# Patient Record
Sex: MALE | Race: WHITE | Employment: OTHER | URBAN - METROPOLITAN AREA
[De-identification: names, ages, dates, MRNs, and addresses within clinical notes are randomized per-mention and may not be internally consistent; named-entity substitution may affect disease eponyms.]

---

## 2017-06-23 ENCOUNTER — TELEPHONE (OUTPATIENT)
Dept: INTERNAL MEDICINE CLINIC | Facility: CLINIC | Age: 77
End: 2017-06-23

## 2017-06-23 ENCOUNTER — OFFICE VISIT (OUTPATIENT)
Dept: INTERNAL MEDICINE CLINIC | Facility: CLINIC | Age: 77
End: 2017-06-23

## 2017-06-23 VITALS
SYSTOLIC BLOOD PRESSURE: 152 MMHG | OXYGEN SATURATION: 98 % | DIASTOLIC BLOOD PRESSURE: 77 MMHG | WEIGHT: 168 LBS | HEART RATE: 55 BPM | TEMPERATURE: 98 F

## 2017-06-23 DIAGNOSIS — R60.9 EDEMA, UNSPECIFIED TYPE: ICD-10-CM
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
## 2017-06-23 DIAGNOSIS — I10 ESSENTIAL HYPERTENSION: ICD-10-CM

## 2017-06-23 DIAGNOSIS — Z76.89 ENCOUNTER TO ESTABLISH CARE: Primary | ICD-10-CM

## 2017-06-23 DIAGNOSIS — M62.81 NEUROLOGICAL MUSCLE WEAKNESS: ICD-10-CM

## 2017-06-23 DIAGNOSIS — R26.2 DIFFICULTY WALKING: ICD-10-CM

## 2017-06-23 DIAGNOSIS — R26.89 BALANCE PROBLEM: ICD-10-CM

## 2017-06-23 PROCEDURE — G0463 HOSPITAL OUTPT CLINIC VISIT: HCPCS | Performed by: INTERNAL MEDICINE

## 2017-06-23 PROCEDURE — 99204 OFFICE O/P NEW MOD 45 MIN: CPT | Performed by: INTERNAL MEDICINE

## 2017-06-23 RX ORDER — HYDROCHLOROTHIAZIDE 25 MG/1
25 TABLET ORAL DAILY
COMMUNITY
End: 2017-09-21

## 2017-06-23 RX ORDER — MELATONIN
2000
COMMUNITY

## 2017-06-23 RX ORDER — CHLORAL HYDRATE 500 MG
1000 CAPSULE ORAL DAILY
COMMUNITY
End: 2018-02-27

## 2017-06-23 RX ORDER — TORSEMIDE 10 MG/1
10 TABLET ORAL DAILY
COMMUNITY
End: 2017-09-21

## 2017-06-23 RX ORDER — PYRIDOXINE HCL (VITAMIN B6) 50 MG
1 TABLET ORAL
COMMUNITY

## 2017-06-23 RX ORDER — VALSARTAN 160 MG/1
160 TABLET ORAL 2 TIMES DAILY
COMMUNITY
End: 2018-02-16

## 2017-06-23 NOTE — TELEPHONE ENCOUNTER
Medical record release signed and faxed with confirmation:    Dr Delia Stubbs  [address]  [address]  [id number]    Dr Lorne Vidales [id number]    Dr Nikolas Loredo  [address]  [id number]

## 2017-06-23 NOTE — TELEPHONE ENCOUNTER
Patient spouse calling she forgot to tell the medical staff her  is also allergic to [address].

## 2017-06-25 PROBLEM — R26.2 DIFFICULTY WALKING: Status: ACTIVE | Noted: 2017-06-25

## 2017-06-25 PROBLEM — R60.9 EDEMA: Status: ACTIVE | Noted: 2017-06-25

## 2017-06-25 PROBLEM — I10 ESSENTIAL HYPERTENSION: Status: ACTIVE | Noted: 2017-06-25

## 2017-06-25 PROBLEM — R26.89 BALANCE PROBLEM: Status: ACTIVE | Noted: 2017-06-25

## 2017-06-25 PROBLEM — M62.81 NEUROLOGICAL MUSCLE WEAKNESS: Status: ACTIVE | Noted: 2017-06-25

## 2017-06-25 NOTE — PATIENT INSTRUCTIONS
ASSESSMENT/PLAN:   Encounter to establish care  (primary encounter diagnosis)- exam is ok  Difficulty walking- use walker, take time, with his of lumbar spine stenosis, will get the notes from NS and images, also will refer her for second opinion due to hi

## 2017-06-25 NOTE — PROGRESS NOTES
HPI:    Patient ID: James Corbett. is a 64year old male. HPI  Patient comes in today for the first time to establish care.   He has history of high blood pressure edema follows with cardiology has been placed recently on both torsemide and hydrochlor Rfl:    torsemide 10 MG Oral Tab Take 10 mg by mouth daily. Disp:  Rfl:    valsartan 160 MG Oral Tab Take 160 mg by mouth daily. Disp:  Rfl:    Cyanocobalamin (B-12) 500 MCG Oral Tab Take 1 tablet by mouth daily.  Disp:  Rfl:    Vitamin D3 1000 units Oral T behavior is normal. Judgment and thought content normal.   Nursing note and vitals reviewed.            ASSESSMENT/PLAN:   Encounter to establish care  (primary encounter diagnosis)- exam is ok  Difficulty walking- use walker, take time, with his of lumbar

## 2017-07-31 ENCOUNTER — TELEPHONE (OUTPATIENT)
Dept: INTERNAL MEDICINE CLINIC | Facility: CLINIC | Age: 77
End: 2017-07-31

## 2017-07-31 NOTE — TELEPHONE ENCOUNTER
Patient spouse calling states patient has parkinson and did discuss at last office visit medication spouse states patient is having more difficulty walking and would like to discuss options with you.

## 2017-08-25 ENCOUNTER — TELEPHONE (OUTPATIENT)
Dept: NEUROLOGY | Facility: CLINIC | Age: 77
End: 2017-08-25

## 2017-08-28 ENCOUNTER — OFFICE VISIT (OUTPATIENT)
Dept: NEUROLOGY | Facility: CLINIC | Age: 77
End: 2017-08-28

## 2017-08-28 ENCOUNTER — TELEPHONE (OUTPATIENT)
Dept: NEUROLOGY | Facility: CLINIC | Age: 77
End: 2017-08-28

## 2017-08-28 VITALS
HEIGHT: 71 IN | BODY MASS INDEX: 26.6 KG/M2 | HEART RATE: 74 BPM | RESPIRATION RATE: 16 BRPM | DIASTOLIC BLOOD PRESSURE: 74 MMHG | SYSTOLIC BLOOD PRESSURE: 132 MMHG | WEIGHT: 190 LBS

## 2017-08-28 DIAGNOSIS — R26.9 GAIT DISORDER: Primary | ICD-10-CM

## 2017-08-28 DIAGNOSIS — R41.89 COGNITIVE IMPAIRMENT: ICD-10-CM

## 2017-08-28 PROCEDURE — 99204 OFFICE O/P NEW MOD 45 MIN: CPT | Performed by: OTHER

## 2017-08-28 NOTE — PROGRESS NOTES
Emanuel Osuna. : 1940     HPI:   Patient presents with:  Parkinson's: Patient presents today with wife who states that patient was referred by Dr. Salvador Carpio to be evaluated for parkinson's.  Patient's wife states that he saw a neurologist about 2.5 MI.      Current Outpatient Prescriptions:  carbidopa-levodopa  MG Oral Tab Take 1 tablet by mouth 2 (two) times daily. Disp: 60 tablet Rfl: 5   hydrochlorothiazide 25 MG Oral Tab Take 25 mg by mouth daily.  Disp:  Rfl:    torsemide 10 MG Oral Tab [facility] upper or lower extremities  PSYCHE:no depression or anxiety  NEURO: As in HPI    EXAM:     Vitals:  /74 (BP Location: Right arm, Patient Position: Sitting, Cuff Size: adult)   Pulse 74   Resp 16   Ht 71\"   Wt 190 lb   BMI 26.50 kg/m² .   Blood pressu especially in the axial muscles. Although he has no tremor, he does have extraparametal features suggesting Parkinson's disease. Multiple subcortical infarcts and hydrocephalus also need to be excluded.   He had an MRI brain done, so a release will be sig

## 2017-08-29 ENCOUNTER — TELEPHONE (OUTPATIENT)
Dept: NEUROLOGY | Facility: CLINIC | Age: 77
End: 2017-08-29

## 2017-08-29 NOTE — TELEPHONE ENCOUNTER
Records reviewed. MRI brain 2015:  Atrophy and white matter changes. CT brain 12/16: unchanged. MRI cervical: deg. Changes, normal cord.  TSH, B12, cholesterol normal.

## 2017-08-30 ENCOUNTER — MED REC SCAN ONLY (OUTPATIENT)
Dept: NEUROLOGY | Facility: CLINIC | Age: 77
End: 2017-08-30

## 2017-09-20 ENCOUNTER — OFFICE VISIT (OUTPATIENT)
Dept: INTERNAL MEDICINE CLINIC | Facility: CLINIC | Age: 77
End: 2017-09-20

## 2017-09-20 VITALS
DIASTOLIC BLOOD PRESSURE: 76 MMHG | TEMPERATURE: 99 F | SYSTOLIC BLOOD PRESSURE: 126 MMHG | HEIGHT: 71 IN | HEART RATE: 66 BPM

## 2017-09-20 DIAGNOSIS — R26.2 DIFFICULTY WALKING: Primary | ICD-10-CM

## 2017-09-20 DIAGNOSIS — R29.898 WEAKNESS OF BOTH LEGS: ICD-10-CM

## 2017-09-20 DIAGNOSIS — G20 PARKINSON'S DISEASE (HCC): ICD-10-CM

## 2017-09-20 DIAGNOSIS — R26.89 BALANCE PROBLEM: ICD-10-CM

## 2017-09-20 DIAGNOSIS — I10 ESSENTIAL HYPERTENSION: ICD-10-CM

## 2017-09-20 DIAGNOSIS — R29.6 FREQUENT FALLS: ICD-10-CM

## 2017-09-20 DIAGNOSIS — R60.9 EDEMA, UNSPECIFIED TYPE: ICD-10-CM

## 2017-09-20 LAB
ANION GAP SERPL CALC-SCNC: 9 MMOL/L (ref 0–18)
BUN SERPL-MCNC: 38 MG/DL (ref 8–20)
BUN/CREAT SERPL: 17.4 (ref 10–20)
CALCIUM SERPL-MCNC: 9.3 MG/DL (ref 8.5–10.5)
CHLORIDE SERPL-SCNC: 99 MMOL/L (ref 95–110)
CO2 SERPL-SCNC: 30 MMOL/L (ref 22–32)
CREAT SERPL-MCNC: 2.18 MG/DL (ref 0.5–1.5)
FOLATE SERPL-MCNC: 15.3 NG/ML
GLUCOSE SERPL-MCNC: 123 MG/DL (ref 70–99)
OSMOLALITY UR CALC.SUM OF ELEC: 296 MOSM/KG (ref 275–295)
POTASSIUM SERPL-SCNC: 3.3 MMOL/L (ref 3.3–5.1)
SODIUM SERPL-SCNC: 138 MMOL/L (ref 136–144)
VIT B12 SERPL-MCNC: 1171 PG/ML (ref 181–914)

## 2017-09-20 PROCEDURE — 99214 OFFICE O/P EST MOD 30 MIN: CPT | Performed by: INTERNAL MEDICINE

## 2017-09-20 PROCEDURE — G0463 HOSPITAL OUTPT CLINIC VISIT: HCPCS | Performed by: INTERNAL MEDICINE

## 2017-09-20 PROCEDURE — 36415 COLL VENOUS BLD VENIPUNCTURE: CPT | Performed by: INTERNAL MEDICINE

## 2017-09-21 ENCOUNTER — TELEPHONE (OUTPATIENT)
Dept: NEUROLOGY | Facility: CLINIC | Age: 77
End: 2017-09-21

## 2017-09-21 NOTE — TELEPHONE ENCOUNTER
LOV  8/28/17. Started on carbidopa-levodopa  mg BID. Wife states first 2 weeks had improvement in walking and mental status and now back to baseline. Gives medication at 9 am and sits at table until 11 am,then able to ambulate.

## 2017-09-25 NOTE — PATIENT INSTRUCTIONS
ASSESSMENT/PLAN:   Difficulty walking  (primary encounter diagnosis)-continues to have walking difficulty been worse lately will get some labs to make sure electrolytes are okay will follow with neurology will order physical therapy  Balance problem.   No f

## 2017-09-25 NOTE — PROGRESS NOTES
HPI:    Patient ID: Ruperto Coleman. is a 64year old male. HPI    Here with wife for follow-up.   He has been stable but lately he feels little more week in walk-in moving around he had seen neurology and was started on carbidopa levodopa initially fel hypertension       No past surgical history on file.    Family History   Problem Relation Age of Onset   • Cancer Father    • Cancer Mother    • Cancer Sister       Social History: Smoking status: Former Smoker unspecified type stable  Frequent falls due to Parkinson's  Weakness of both legs is gotten worse lately as above will get some electrolytes may need to get some physical therapy  Parkinson's disease (hcc)-with neurology was started on carbidopa levodopa

## 2017-09-26 ENCOUNTER — TELEPHONE (OUTPATIENT)
Dept: NEUROLOGY | Facility: CLINIC | Age: 77
End: 2017-09-26

## 2017-09-26 ENCOUNTER — TELEPHONE (OUTPATIENT)
Dept: INTERNAL MEDICINE CLINIC | Facility: CLINIC | Age: 77
End: 2017-09-26

## 2017-09-26 NOTE — TELEPHONE ENCOUNTER
Spoke with wife, informed patient to start torsemide 10 mg daily and follow up in one week. Appointment scheduled 10/4 at 1 pm with Dr. George Aguilera.

## 2017-09-26 NOTE — TELEPHONE ENCOUNTER
Spoke with wife shraddha. Per wife, feet are swollen, skin appears pink, stiffness, denies pain. Patient with sensation to toes. Informed wife that patient needs to return in two weeks per test results. Appointment changed.  Wife said patient has US appointmen

## 2017-09-26 NOTE — TELEPHONE ENCOUNTER
imaging cd and report from [facility]   Cd to be loaded in PACS

## 2017-09-27 ENCOUNTER — HOSPITAL ENCOUNTER (OUTPATIENT)
Dept: ULTRASOUND IMAGING | Facility: HOSPITAL | Age: 77
Discharge: HOME | End: 2017-09-27
Attending: INTERNAL MEDICINE
Payer: MEDICARE

## 2017-09-27 DIAGNOSIS — N17.9 ACUTE RENAL FAILURE SUPERIMPOSED ON CHRONIC KIDNEY DISEASE, UNSPECIFIED CKD STAGE, UNSPECIFIED ACUTE RENAL FAILURE TYPE (HCC): ICD-10-CM

## 2017-09-27 DIAGNOSIS — N18.9 ACUTE RENAL FAILURE SUPERIMPOSED ON CHRONIC KIDNEY DISEASE, UNSPECIFIED CKD STAGE, UNSPECIFIED ACUTE RENAL FAILURE TYPE (HCC): ICD-10-CM

## 2017-09-27 PROCEDURE — 76770 US EXAM ABDO BACK WALL COMP: CPT | Performed by: INTERNAL MEDICINE

## 2017-10-02 ENCOUNTER — MED REC SCAN ONLY (OUTPATIENT)
Dept: NEUROLOGY | Facility: CLINIC | Age: 77
End: 2017-10-02

## 2017-10-02 ENCOUNTER — TELEPHONE (OUTPATIENT)
Dept: NEUROLOGY | Facility: CLINIC | Age: 77
End: 2017-10-02

## 2017-10-02 NOTE — TELEPHONE ENCOUNTER
[address] [address]. Patient still has refills on sinemet. They will refill it for patient. Spoke to Kenia Kulkarni and notified her that [facility] [address] does have refills on medication. She was understanding and thankful for the return call.

## 2017-10-02 NOTE — TELEPHONE ENCOUNTER
Almost finished with medication.  Patient's wife wants to know if Deirdre Crockett should remain on medication, if so, will need a refill

## 2017-10-03 ENCOUNTER — HOSPITAL ENCOUNTER (EMERGENCY)
Facility: HOSPITAL | Age: 77
Discharge: HOME | End: 2017-10-03
Attending: EMERGENCY MEDICINE
Payer: MEDICARE

## 2017-10-03 ENCOUNTER — TELEPHONE (OUTPATIENT)
Dept: INTERNAL MEDICINE CLINIC | Facility: CLINIC | Age: 77
End: 2017-10-03

## 2017-10-03 VITALS
BODY MASS INDEX: 27 KG/M2 | WEIGHT: 190.06 LBS | SYSTOLIC BLOOD PRESSURE: 177 MMHG | TEMPERATURE: 98 F | HEART RATE: 68 BPM | RESPIRATION RATE: 18 BRPM | OXYGEN SATURATION: 97 % | DIASTOLIC BLOOD PRESSURE: 86 MMHG

## 2017-10-03 DIAGNOSIS — R33.9 URINARY RETENTION: Primary | ICD-10-CM

## 2017-10-03 PROCEDURE — 81003 URINALYSIS AUTO W/O SCOPE: CPT | Performed by: EMERGENCY MEDICINE

## 2017-10-03 PROCEDURE — 80048 BASIC METABOLIC PNL TOTAL CA: CPT | Performed by: EMERGENCY MEDICINE

## 2017-10-03 PROCEDURE — 99283 EMERGENCY DEPT VISIT LOW MDM: CPT

## 2017-10-03 PROCEDURE — 85025 COMPLETE CBC W/AUTO DIFF WBC: CPT | Performed by: EMERGENCY MEDICINE

## 2017-10-03 PROCEDURE — 51702 INSERT TEMP BLADDER CATH: CPT

## 2017-10-03 PROCEDURE — 36415 COLL VENOUS BLD VENIPUNCTURE: CPT

## 2017-10-03 RX ORDER — TAMSULOSIN HYDROCHLORIDE 0.4 MG/1
0.4 CAPSULE ORAL DAILY
Qty: 7 CAPSULE | Refills: 0 | Status: SHIPPED | OUTPATIENT
Start: 2017-10-03 | End: 2017-10-09 | Stop reason: CLARIF

## 2017-10-03 NOTE — TELEPHONE ENCOUNTER
Patients wife called to thank you and your staff your concern, and excelent care for her .   Patient was seen in the ER, he now has a clark and will follow up with new urologist.

## 2017-10-03 NOTE — TELEPHONE ENCOUNTER
Notes Recorded by Abigail Aguilar RN on 10/2/2017 at 3:31 PM CDT  Left message on voice mail for patient to call for results.   ------    Notes Recorded by Eli Pulliam MD on 10/2/2017 at 3:20 PM CDT  Pt needs to go to ER with worsening renal function and

## 2017-10-03 NOTE — ED PROVIDER NOTES
Patient Seen in: [facility] AND [facility] Emergency Department     History   Patient presents with:  Abdomen/Flank Pain (GI/)  Urinary Symptoms (urologic)    Stated Complaint:     HPI    64year old male complains of low back pain since starting a diuretic rece [10/03/17 1107]  SpO2: 100 % [10/03/17 1107]  O2 Device: None (Room air) [10/03/17 1107]    Current:BP (!) 177/86   Pulse 68   Temp 98.3 °F (36.8 °C) (Oral)   Resp 18   Wt 86.2 kg   SpO2 97%   BMI 26.50 kg/m²         Physical Exam   Constitutional: He is o normal limits   CBC W/ DIFFERENTIAL - Abnormal; Notable for the following:     RBC 4.47 (*)     HGB 13.3 (*)     HCT 39.7 (*)     All other components within normal limits   CBC WITH DIFFERENTIAL WITH PLATELET    Narrative:      The following orders were cr any recent pertinent discharge summaries, testing, and procedures and reviewed those reports. Complicating Factors: The patient already has has Difficulty walking; Balance problem;  Neurological muscle weakness; Essential hypertension; and Edema on his and keeping a blood pressure log to bring to their physician.       Disposition and Plan     Clinical Impression:  Urinary retention  (primary encounter diagnosis)    Disposition:  Discharge    Follow-up:  Martinez Robles MD  [address] [facility]

## 2017-10-03 NOTE — ED INITIAL ASSESSMENT (HPI)
Patient sent by Dr. Gogo Asher for US of bladder and kidneys. Patient denies any pain, frequent urination.

## 2017-10-04 ENCOUNTER — TELEPHONE (OUTPATIENT)
Dept: SURGERY | Facility: CLINIC | Age: 77
End: 2017-10-04

## 2017-10-04 ENCOUNTER — OFFICE VISIT (OUTPATIENT)
Dept: INTERNAL MEDICINE CLINIC | Facility: CLINIC | Age: 77
End: 2017-10-04

## 2017-10-04 VITALS
DIASTOLIC BLOOD PRESSURE: 78 MMHG | HEIGHT: 71 IN | WEIGHT: 181.81 LBS | SYSTOLIC BLOOD PRESSURE: 168 MMHG | TEMPERATURE: 99 F | HEART RATE: 55 BPM | BODY MASS INDEX: 25.45 KG/M2

## 2017-10-04 DIAGNOSIS — N13.30 HYDRONEPHROSIS, UNSPECIFIED HYDRONEPHROSIS TYPE: ICD-10-CM

## 2017-10-04 DIAGNOSIS — R33.9 URINARY RETENTION: ICD-10-CM

## 2017-10-04 DIAGNOSIS — N18.9 ACUTE RENAL FAILURE SUPERIMPOSED ON CHRONIC KIDNEY DISEASE, UNSPECIFIED CKD STAGE, UNSPECIFIED ACUTE RENAL FAILURE TYPE (HCC): ICD-10-CM

## 2017-10-04 DIAGNOSIS — N17.9 ACUTE RENAL FAILURE SUPERIMPOSED ON CHRONIC KIDNEY DISEASE, UNSPECIFIED CKD STAGE, UNSPECIFIED ACUTE RENAL FAILURE TYPE (HCC): ICD-10-CM

## 2017-10-04 DIAGNOSIS — I10 ESSENTIAL HYPERTENSION: ICD-10-CM

## 2017-10-04 DIAGNOSIS — Z09 ENCOUNTER FOR EXAMINATION FOLLOWING TREATMENT AT HOSPITAL: Primary | ICD-10-CM

## 2017-10-04 PROCEDURE — G0463 HOSPITAL OUTPT CLINIC VISIT: HCPCS | Performed by: INTERNAL MEDICINE

## 2017-10-04 PROCEDURE — 99214 OFFICE O/P EST MOD 30 MIN: CPT | Performed by: INTERNAL MEDICINE

## 2017-10-04 RX ORDER — AMLODIPINE BESYLATE 5 MG/1
5 TABLET ORAL DAILY
Qty: 30 TABLET | Refills: 2 | Status: SHIPPED | OUTPATIENT
Start: 2017-10-04 | End: 2018-02-16

## 2017-10-04 NOTE — PROGRESS NOTES
HPI:    Patient ID: Elba Krause. is a 64year old male. HPI  Patient comes in for follow-up after he was seen in ER for acute renal failure with hydronephrosis and urinary retention.   Clark was placed morning on thousand cc was drained clark was le by mouth daily. Disp:  Rfl:    omega-3 fatty acids 1000 MG Oral Cap Take 1,000 mg by mouth daily. Disp:  Rfl:      Allergies:  Codeine                 Other (See Comments)    Comment:Pt could not wake up.   Escitalopram                HISTORY:  Past Medical (primary encounter diagnosis)- doing ok, clark was placed   Urinary retention- clark draining, pt was started on Flomax, will refer to urology  Hydronephrosis, unspecified hydronephrosis type- as above, ?  Cause, will follow  with urologist   Acute renal fa

## 2017-10-04 NOTE — PATIENT INSTRUCTIONS
ASSESSMENT/PLAN:   Encounter for examination following treatment at hospital  (primary encounter diagnosis)- doing ok, clark was placed   Urinary retention- clark draining, pt was started on Flomax, will refer to urology  Hydronephrosis, unspecified hydron

## 2017-10-04 NOTE — TELEPHONE ENCOUNTER
Pts spouse states pt was seen at [address] [place] [address] ED yesterday for urinary retention, pt saw PCP today and was advised to make appt with [facility]K next week. Pls advise thank you.

## 2017-10-05 ENCOUNTER — TELEPHONE (OUTPATIENT)
Dept: INTERNAL MEDICINE CLINIC | Facility: CLINIC | Age: 77
End: 2017-10-05

## 2017-10-05 NOTE — TELEPHONE ENCOUNTER
See telephone encounter for urology of same date. Message was forwarded to urology clinical staff for triage.

## 2017-10-06 NOTE — TELEPHONE ENCOUNTER
Spoke to jeannie at Dr. Kimberly Higginbotham office . They are able to add him to the scheduled on Monday October 9 th at 1:20 pm  [facility] 2nd floor .  Patient spouse contacted

## 2017-10-06 NOTE — TELEPHONE ENCOUNTER
Patient has called Dr Jered Moore office they have no opening with any of there doctors patient sill with blood in urine can not get appointment in near future message was sent to specialist patient spouse Denise Boyd has not heard from anyone.

## 2017-10-06 NOTE — TELEPHONE ENCOUNTER
Consult scheduled for Mon 10/9 @ 1:20 pm with Dr. Mark Anthony Quevedo. Pretty Galvan in Dr. José Miguel Mitchell office is aware and states she will contact patient.

## 2017-10-06 NOTE — TELEPHONE ENCOUNTER
Pt's wife called stating pt was in [facility] hosp er 10-3-17. Cath to drain the urine. Ultra sound done 9-27-17. Pt advised by pcp to be seen 10-9-17 or 10-10-17.   Call pt to advise

## 2017-10-06 NOTE — TELEPHONE ENCOUNTER
Can we call the office he can see either one of the urologist , tell them Dr George Aguilera is requesting

## 2017-10-06 NOTE — TELEPHONE ENCOUNTER
Spoke to [address] HARESH/Tyler Tran in Dr. Lilo Hollingsworth office. Offered consult appointment for Mon 10/9 @ 1:20 pm; patient to arrive 15 min before. [address] HARESH/Tyler Tran in Dr. Lilo Hollingsworth office agrees to consult appointment and states she will call patient. Consult scheduled.

## 2017-10-07 ENCOUNTER — HOSPITAL ENCOUNTER (EMERGENCY)
Facility: HOSPITAL | Age: 77
Discharge: HOME | End: 2017-10-07
Attending: EMERGENCY MEDICINE
Payer: MEDICARE

## 2017-10-07 ENCOUNTER — TELEPHONE (OUTPATIENT)
Dept: INTERNAL MEDICINE CLINIC | Facility: CLINIC | Age: 77
End: 2017-10-07

## 2017-10-07 VITALS
RESPIRATION RATE: 20 BRPM | HEIGHT: 72 IN | TEMPERATURE: 99 F | WEIGHT: 189 LBS | OXYGEN SATURATION: 99 % | HEART RATE: 80 BPM | DIASTOLIC BLOOD PRESSURE: 78 MMHG | BODY MASS INDEX: 25.6 KG/M2 | SYSTOLIC BLOOD PRESSURE: 138 MMHG

## 2017-10-07 DIAGNOSIS — N48.1 BALANITIS: Primary | ICD-10-CM

## 2017-10-07 DIAGNOSIS — Z97.8 PRESENCE OF INDWELLING FOLEY CATHETER: ICD-10-CM

## 2017-10-07 PROCEDURE — 87077 CULTURE AEROBIC IDENTIFY: CPT | Performed by: EMERGENCY MEDICINE

## 2017-10-07 PROCEDURE — 87186 SC STD MICRODIL/AGAR DIL: CPT | Performed by: EMERGENCY MEDICINE

## 2017-10-07 PROCEDURE — 81001 URINALYSIS AUTO W/SCOPE: CPT | Performed by: EMERGENCY MEDICINE

## 2017-10-07 PROCEDURE — 87086 URINE CULTURE/COLONY COUNT: CPT | Performed by: EMERGENCY MEDICINE

## 2017-10-07 PROCEDURE — 99283 EMERGENCY DEPT VISIT LOW MDM: CPT

## 2017-10-07 RX ORDER — CLOTRIMAZOLE AND BETAMETHASONE DIPROPIONATE 10; .64 MG/G; MG/G
1 CREAM TOPICAL 2 TIMES DAILY
Qty: 15 G | Refills: 0 | Status: SHIPPED | OUTPATIENT
Start: 2017-10-07 | End: 2017-12-11

## 2017-10-07 NOTE — ED INITIAL ASSESSMENT (HPI)
Pt states clark cath is very painful swelling at the tip, and some bleeding around tip of cath, has had cath since Oct 3 rd due to urinary retention has a appt with urinologist Monday

## 2017-10-07 NOTE — ED PROVIDER NOTES
Patient Seen in: [facility] Emergency Department    History   No chief complaint on file. Stated Complaint: Cath Problem    HPI    Patient presents emergency department complaining of irritation swelling and discomfort at the tip of the penis. Normocephalic. Eyes: Conjunctivae and EOM are normal.   Neck: Normal range of motion. Neck supple. Cardiovascular: Normal rate and regular rhythm. No murmur heard. Pulmonary/Chest: Effort normal and breath sounds normal. No respiratory distress. follow up care with a primary care provider within the next three months to obtain basic health screening including reassessment of your blood pressure. \"      Disposition and Plan     Clinical Impression:  Balanitis  (primary encounter diagnosis)  Presenc

## 2017-10-07 NOTE — TELEPHONE ENCOUNTER
Spoke with patient's wife, Ankit Griggs, and advised ER evaluation. She agreed and will take him over.

## 2017-10-07 NOTE — TELEPHONE ENCOUNTER
Please see Reason for Call notes. Catheterized patient with swollen tip of penis, some bleeding. Appointment with urology Monday at 1. Dr. Avery Gifford prescribed bacitracin but patient now experiencing pain when sitting and when walking. Please advise.

## 2017-10-09 ENCOUNTER — OFFICE VISIT (OUTPATIENT)
Dept: SURGERY | Facility: CLINIC | Age: 77
End: 2017-10-09

## 2017-10-09 ENCOUNTER — TELEPHONE (OUTPATIENT)
Dept: SURGERY | Facility: CLINIC | Age: 77
End: 2017-10-09

## 2017-10-09 ENCOUNTER — LAB ENCOUNTER (OUTPATIENT)
Dept: LAB | Facility: HOSPITAL | Age: 77
End: 2017-10-09
Attending: UROLOGY
Payer: MEDICARE

## 2017-10-09 VITALS
SYSTOLIC BLOOD PRESSURE: 144 MMHG | BODY MASS INDEX: 25.6 KG/M2 | WEIGHT: 189 LBS | HEART RATE: 65 BPM | TEMPERATURE: 98 F | HEIGHT: 72 IN | DIASTOLIC BLOOD PRESSURE: 79 MMHG | RESPIRATION RATE: 17 BRPM

## 2017-10-09 DIAGNOSIS — N13.30 HYDRONEPHROSIS, UNSPECIFIED HYDRONEPHROSIS TYPE: ICD-10-CM

## 2017-10-09 DIAGNOSIS — N19 RENAL FAILURE, UNSPECIFIED CHRONICITY: ICD-10-CM

## 2017-10-09 DIAGNOSIS — N40.1 BENIGN PROSTATIC HYPERPLASIA WITH URINARY RETENTION: ICD-10-CM

## 2017-10-09 DIAGNOSIS — D72.829 LEUKOCYTOSIS, UNSPECIFIED TYPE: ICD-10-CM

## 2017-10-09 DIAGNOSIS — R33.8 BENIGN PROSTATIC HYPERPLASIA WITH URINARY RETENTION: ICD-10-CM

## 2017-10-09 DIAGNOSIS — G20 PARKINSON'S DISEASE (HCC): ICD-10-CM

## 2017-10-09 DIAGNOSIS — N13.30 HYDRONEPHROSIS, UNSPECIFIED HYDRONEPHROSIS TYPE: Primary | ICD-10-CM

## 2017-10-09 DIAGNOSIS — N39.0 RECURRENT UTI: ICD-10-CM

## 2017-10-09 PROCEDURE — 36415 COLL VENOUS BLD VENIPUNCTURE: CPT

## 2017-10-09 PROCEDURE — G0463 HOSPITAL OUTPT CLINIC VISIT: HCPCS | Performed by: UROLOGY

## 2017-10-09 PROCEDURE — 80069 RENAL FUNCTION PANEL: CPT

## 2017-10-09 PROCEDURE — 85025 COMPLETE CBC W/AUTO DIFF WBC: CPT

## 2017-10-09 PROCEDURE — 99204 OFFICE O/P NEW MOD 45 MIN: CPT | Performed by: UROLOGY

## 2017-10-09 RX ORDER — TAMSULOSIN HYDROCHLORIDE 0.4 MG/1
CAPSULE ORAL
Qty: 30 CAPSULE | Refills: 11 | Status: ON HOLD | OUTPATIENT
Start: 2017-10-09 | End: 2017-12-04

## 2017-10-09 RX ORDER — FINASTERIDE 5 MG/1
5 TABLET, FILM COATED ORAL DAILY
Qty: 30 TABLET | Refills: 11 | Status: SHIPPED | OUTPATIENT
Start: 2017-10-09 | End: 2018-10-22

## 2017-10-09 NOTE — TELEPHONE ENCOUNTER
,please see message below. See portion of your lov copied and pasted below as follows. It appears Finasteride was not ordered in [facility]. I have pended order of you, please review and sign if correct. Thank you.   9.  Start Finasteride 5 mg daily --- ple

## 2017-10-09 NOTE — PATIENT INSTRUCTIONS
1. CBC today to make sure no dressing urinary tract infection in light of worsening fatigue and being more tired today. Also blood draw for renal panel to reevaluate your kidney function (kidney function was impaired on blood test a few weeks ago)    2. your kidneys. Eating less protein may slow the progression of chronic kidney disease. Foods high in protein include meat, fish, eggs, cheese, and other dairy products.  A registered dietitian can help you plan a diet that has the right amount of protein for Limit your intake of phosphorus-rich foods. These include dried beans and peas, nuts, peanut butter, cocoa, beer, cola drinks, and dairy products.   Date Last Reviewed: 8/1/2016  © [id number] [place] [address]

## 2017-10-09 NOTE — PROGRESS NOTES
Misty Hernandez. is a 64year old male. HPI:   Patient presents with:  Urinary Symptoms (urologic): Catheter was placed on ER on 10/3/2017 do to urinary issues        History provided by pt and wife Robel Shen.     Urinary Incontinence  Patient is currently On his visit on 8/28/2017 with Dr. Asia Menon, pt had \"gait difficulties, bradykinesia and increased tone especially in the axial muscles suggestive of Parkinson's disease. Patient is present on a wheel chair. He does say he urinates standing up.  His wife s mupirocin 2 % External Ointment Apply 1 Application topically 2 (two) times daily. Disp: 1 Tube Rfl: 0   AmLODIPine Besylate 5 MG Oral Tab Take 1 tablet (5 mg total) by mouth daily.  Disp: 30 tablet Rfl: 2   valsartan 160 MG Oral Tab Take 160 mg by mouth 2 Constitutional: appears well hydrated alert and responsive no acute distress noted  Neurological: Oriented to time, place, person with normal affect  Exam appropriate for age; patellar reflexes not performed  Head/Face: normocephalic  Eyes/Vision: no scler 9/27/2017 US KIDNEY/BLADDER = Moderate right hydroureteronephrosis and, of uncertain etiology or chronicity; Extensive post void residual urinary bladder volume of 1194 mL.           ASSESSMENT/PLAN:      (N13.30) Hydronephrosis, unspecified hydronephrosis (Jesse Avila) Parkinson's disease [facility])  Patient has worsening gait problems and is confined to wheel chair and he also uses depends. Parkinson's can contribute to the urinary retention and will have to be further investigated with the treatments listed above. Jacob Hernandez.  is a very pleasant patient and I thoroughly enjoyed evaluating him  in consultation. I thank you for sending the patient to see me. I will do my best to keep you informed of  all significant urological findings and developments.   Than

## 2017-10-09 NOTE — TELEPHONE ENCOUNTER
pts wife, Haroldo Parents called. Jerad Alvarado was seen today by [facility]. She states they picked up Tamsulosin from the [facility], But the Finasteride was not called in. Please advise.

## 2017-10-10 ENCOUNTER — APPOINTMENT (OUTPATIENT)
Dept: ULTRASOUND IMAGING | Facility: HOSPITAL | Age: 77
End: 2017-10-10
Payer: MEDICARE

## 2017-10-10 ENCOUNTER — TELEPHONE (OUTPATIENT)
Dept: SURGERY | Facility: CLINIC | Age: 77
End: 2017-10-10

## 2017-10-10 ENCOUNTER — HOSPITAL ENCOUNTER (EMERGENCY)
Facility: HOSPITAL | Age: 77
Discharge: HOME | End: 2017-10-10
Payer: MEDICARE

## 2017-10-10 ENCOUNTER — TELEPHONE (OUTPATIENT)
Dept: INTERNAL MEDICINE CLINIC | Facility: CLINIC | Age: 77
End: 2017-10-10

## 2017-10-10 ENCOUNTER — APPOINTMENT (OUTPATIENT)
Dept: GENERAL RADIOLOGY | Facility: HOSPITAL | Age: 77
End: 2017-10-10
Attending: EMERGENCY MEDICINE
Payer: MEDICARE

## 2017-10-10 VITALS
HEIGHT: 72 IN | RESPIRATION RATE: 18 BRPM | DIASTOLIC BLOOD PRESSURE: 83 MMHG | WEIGHT: 181 LBS | TEMPERATURE: 99 F | SYSTOLIC BLOOD PRESSURE: 143 MMHG | OXYGEN SATURATION: 97 % | HEART RATE: 70 BPM | BODY MASS INDEX: 24.52 KG/M2

## 2017-10-10 DIAGNOSIS — M19.90 ACUTE ARTHRITIS: Primary | ICD-10-CM

## 2017-10-10 PROCEDURE — 36415 COLL VENOUS BLD VENIPUNCTURE: CPT

## 2017-10-10 PROCEDURE — 99284 EMERGENCY DEPT VISIT MOD MDM: CPT

## 2017-10-10 PROCEDURE — 93971 EXTREMITY STUDY: CPT

## 2017-10-10 PROCEDURE — 84550 ASSAY OF BLOOD/URIC ACID: CPT | Performed by: EMERGENCY MEDICINE

## 2017-10-10 PROCEDURE — 73610 X-RAY EXAM OF ANKLE: CPT | Performed by: EMERGENCY MEDICINE

## 2017-10-10 RX ORDER — PREDNISONE 20 MG/1
TABLET ORAL
Qty: 12 TABLET | Refills: 0 | Status: SHIPPED | OUTPATIENT
Start: 2017-10-10 | End: 2017-10-18

## 2017-10-10 NOTE — TELEPHONE ENCOUNTER
Spoke with Wife called concerned as patient is so weak, unable to walk even with walker. Appetite good, clark draining well.   /87 P-79 1 hour after BP med,  BP at 12 noon is 147/88 P-72  Patient with considerable swelling of ankles right more than l

## 2017-10-10 NOTE — TELEPHONE ENCOUNTER
Pt is very weak today. Pt went to [facility] er on 10-7-17. Took blood and urine. appt 10-9-17 with pvk. Received a call from dr Maria Isabel Pinto from [place] [address], reviewed  the results and pt should take rx. Cipro. Please call to confirm if pt should take the rx.  Cip

## 2017-10-10 NOTE — TELEPHONE ENCOUNTER
Contacted patient's pharmacy, [address] [place] [address] and advised that patient should be taking both tamsulosin and finasteride. Patient's pharmacy verbalized understanding. All questions answered.  Per yesterday's office visit; recommendation/plan (copied and pasted be

## 2017-10-10 NOTE — TELEPHONE ENCOUNTER
Per Dr. Brandon Jack; patient is not to start antibiotic today for bacteria in urine; once determination of clark catheter removal; will determine when to start antibiotic. Patient's wife contacted; relayed Dr. Catalina Monzon' message to patient's wife.

## 2017-10-10 NOTE — TELEPHONE ENCOUNTER
Patients wife called again to let MERVAT Jha know PCP is sending patient to the hospital. Please call.

## 2017-10-10 NOTE — TELEPHONE ENCOUNTER
Spoke to patient's wife. Patient's wife states ER MD called today and prescribed Cipro 500 mg PO BID x  7 days for patient's 10/7 abnormal urine culture; wife is asking if patient should start Cipro?   Advised patient's wife that at yesterday's office visi

## 2017-10-10 NOTE — TELEPHONE ENCOUNTER
Wife states will take patient to ER will need ultrasound to r/o DVT due to swelling. Instructed to tell ER to Call Dr. Jose L Queen before patient is released. Wife agreed  ER triage notified to call Dr. Jose L Queen before sending home.

## 2017-10-10 NOTE — ED NOTES
Pt states has had swelling in ankle x 2 months. Sunday night woke up and couldn't walk rt foot. Usually can walk with walker. Has been having trouble since Sunday. Denies fall or injury.

## 2017-10-10 NOTE — ED PROVIDER NOTES
Patient Seen in: [facility] AND [facility] Emergency Department    History   Patient presents with:  Swelling Edema (cardiovascular, metabolic)    Stated Complaint:     HPI    Patient is a 78-year-old male who presents to the emergency department with a chief co Eyes: Conjunctivae and EOM are normal. Pupils are equal, round, and reactive to light. Neck: Neck supple. Cardiovascular: Normal rate, regular rhythm and normal heart sounds. Pulmonary/Chest: Effort normal.   Abdominal: Soft.  Bowel sounds are normal

## 2017-10-10 NOTE — ED INITIAL ASSESSMENT (HPI)
Pt with redness and swelling x 2 months that pt \"thought was from BP medication. \" Pt states now the right ankle hard, red, swollen, painful. Per family, opt has been more weak and unable to stand well.  Pt seen here in ED recently for urology complication

## 2017-10-10 NOTE — TELEPHONE ENCOUNTER
Pharm calling. Pt had picked up 10-9-17 rx tamsulosin. After pharm received a rx. Finasteride. Should pt be taking both rx.   Call to advise

## 2017-10-11 ENCOUNTER — HOSPITAL ENCOUNTER (OUTPATIENT)
Dept: ULTRASOUND IMAGING | Facility: HOSPITAL | Age: 77
Discharge: HOME | DRG: 369 | End: 2017-10-11
Attending: UROLOGY
Payer: MEDICARE

## 2017-10-11 DIAGNOSIS — N13.30 HYDRONEPHROSIS, UNSPECIFIED HYDRONEPHROSIS TYPE: ICD-10-CM

## 2017-10-11 PROCEDURE — 76770 US EXAM ABDO BACK WALL COMP: CPT | Performed by: UROLOGY

## 2017-10-11 NOTE — TELEPHONE ENCOUNTER
Patients wife calling to speak with RN. States during yesterdays visit to ER patient was given medication to take and wants to make sure it is okay to take with Medication that PVK prescribed for patient. Per wife it urgent that someone calls back.  Please

## 2017-10-11 NOTE — TELEPHONE ENCOUNTER
Spoke with ER attending. U/S negative for DVT. Ankle L swelling. ? Gout. No evidence of  Infection. Check uric acid level. Prednisone X 5 days. F/U with PCP. Message to PCP.

## 2017-10-11 NOTE — TELEPHONE ENCOUNTER
Spoke with Ina Hayes, who states ok to tell pt's wife, that prednisone will not interact with his urology meds, however she should check with his PCP, as well. Phoned wife and informed her of this. She is thankful and agrees to plan. , thank you.

## 2017-10-11 NOTE — TELEPHONE ENCOUNTER
Returned wife's call and spoke with her. She states pt was given a prescription for Prednisone and wants to make sure it is ok to take with the meds  has him on.  Advised wife, ER doctor would have reviewed his med list, however the best thing would b

## 2017-10-11 NOTE — TELEPHONE ENCOUNTER
Wife called regarding prescription for prednisone. Asking if ok to take? Concern with interaction with other meds and concern with leg swelling. Asking if ok to take?

## 2017-10-13 ENCOUNTER — APPOINTMENT (OUTPATIENT)
Dept: CT IMAGING | Facility: HOSPITAL | Age: 77
DRG: 369 | End: 2017-10-13
Attending: EMERGENCY MEDICINE
Payer: MEDICARE

## 2017-10-13 ENCOUNTER — HOSPITAL ENCOUNTER (INPATIENT)
Facility: HOSPITAL | Age: 77
LOS: 4 days | Discharge: NURSING, REHAB OR LONG-TERM CARE | DRG: 369 | End: 2017-10-18
Attending: EMERGENCY MEDICINE | Admitting: INTERNAL MEDICINE
Payer: MEDICARE

## 2017-10-13 ENCOUNTER — APPOINTMENT (OUTPATIENT)
Dept: GENERAL RADIOLOGY | Facility: HOSPITAL | Age: 77
DRG: 369 | End: 2017-10-13
Attending: EMERGENCY MEDICINE
Payer: MEDICARE

## 2017-10-13 ENCOUNTER — TELEPHONE (OUTPATIENT)
Dept: INTERNAL MEDICINE CLINIC | Facility: CLINIC | Age: 77
End: 2017-10-13

## 2017-10-13 DIAGNOSIS — N30.00 ACUTE CYSTITIS WITHOUT HEMATURIA: Primary | ICD-10-CM

## 2017-10-13 DIAGNOSIS — R11.2 NAUSEA AND VOMITING IN ADULT: ICD-10-CM

## 2017-10-13 PROCEDURE — 74177 CT ABD & PELVIS W/CONTRAST: CPT | Performed by: EMERGENCY MEDICINE

## 2017-10-13 PROCEDURE — 71010 XR CHEST AP PORTABLE  (CPT=71010): CPT | Performed by: EMERGENCY MEDICINE

## 2017-10-13 RX ORDER — ONDANSETRON 2 MG/ML
4 INJECTION INTRAMUSCULAR; INTRAVENOUS ONCE
Status: COMPLETED | OUTPATIENT
Start: 2017-10-13 | End: 2017-10-13

## 2017-10-13 RX ORDER — METOCLOPRAMIDE HYDROCHLORIDE 5 MG/ML
5 INJECTION INTRAMUSCULAR; INTRAVENOUS ONCE
Status: COMPLETED | OUTPATIENT
Start: 2017-10-13 | End: 2017-10-14

## 2017-10-13 RX ORDER — CIPROFLOXACIN 2 MG/ML
400 INJECTION, SOLUTION INTRAVENOUS ONCE
Status: COMPLETED | OUTPATIENT
Start: 2017-10-13 | End: 2017-10-14

## 2017-10-13 RX ORDER — FAMOTIDINE 10 MG/ML
20 INJECTION, SOLUTION INTRAVENOUS ONCE
Status: COMPLETED | OUTPATIENT
Start: 2017-10-13 | End: 2017-10-14

## 2017-10-13 NOTE — TELEPHONE ENCOUNTER
Spoke with wife ankle still a little swollen but not painful. Will stop prednisone and see how he does. Will try ice/cool pack to swollen ankle   Try to eat light foods as tolerated, broth, tea, toast, crackers, and advance as tolerated.   Call if not i

## 2017-10-13 NOTE — TELEPHONE ENCOUNTER
Patient wife would like to speak to a nurse this morning patient vomited yellow substance and now he is just sleeping

## 2017-10-13 NOTE — TELEPHONE ENCOUNTER
Spoke with wife states patient very dizzy since taking prednisone, upset stomach, vomited this morning, and has not had anything to eat since then. Just lying in bed does not want to get up feeling so miserable. Asking should he stop prednisone ?

## 2017-10-13 NOTE — TELEPHONE ENCOUNTER
If the ankle pain and swelling is nikunj than yes, also to come and see me on Monday, or if not better to go to immediate or ER

## 2017-10-14 ENCOUNTER — TELEPHONE (OUTPATIENT)
Dept: SURGERY | Facility: CLINIC | Age: 77
End: 2017-10-14

## 2017-10-14 PROBLEM — R11.2 NAUSEA AND VOMITING IN ADULT: Status: ACTIVE | Noted: 2017-10-14

## 2017-10-14 PROBLEM — N30.00 ACUTE CYSTITIS WITHOUT HEMATURIA: Status: ACTIVE | Noted: 2017-10-14

## 2017-10-14 RX ORDER — PANTOPRAZOLE SODIUM 40 MG/1
40 TABLET, DELAYED RELEASE ORAL
Status: DISCONTINUED | OUTPATIENT
Start: 2017-10-14 | End: 2017-10-18

## 2017-10-14 RX ORDER — SODIUM CHLORIDE 450 MG/100ML
INJECTION, SOLUTION INTRAVENOUS CONTINUOUS
Status: DISCONTINUED | OUTPATIENT
Start: 2017-10-14 | End: 2017-10-18

## 2017-10-14 RX ORDER — FINASTERIDE 5 MG/1
5 TABLET, FILM COATED ORAL DAILY
Status: DISCONTINUED | OUTPATIENT
Start: 2017-10-14 | End: 2017-10-18

## 2017-10-14 RX ORDER — POTASSIUM CHLORIDE 20 MEQ/1
40 TABLET, EXTENDED RELEASE ORAL ONCE
Status: COMPLETED | OUTPATIENT
Start: 2017-10-14 | End: 2017-10-14

## 2017-10-14 RX ORDER — CHLORAL HYDRATE 500 MG
1000 CAPSULE ORAL DAILY
Status: DISCONTINUED | OUTPATIENT
Start: 2017-10-14 | End: 2017-10-14 | Stop reason: RX

## 2017-10-14 RX ORDER — MAGNESIUM OXIDE 400 MG (241.3 MG MAGNESIUM) TABLET
400 TABLET ONCE
Status: COMPLETED | OUTPATIENT
Start: 2017-10-14 | End: 2017-10-14

## 2017-10-14 RX ORDER — ALFUZOSIN HYDROCHLORIDE 10 MG/1
10 TABLET, EXTENDED RELEASE ORAL DAILY
Status: DISCONTINUED | OUTPATIENT
Start: 2017-10-14 | End: 2017-10-18

## 2017-10-14 RX ORDER — CLOTRIMAZOLE AND BETAMETHASONE DIPROPIONATE 10; .64 MG/G; MG/G
1 CREAM TOPICAL 2 TIMES DAILY
Status: DISCONTINUED | OUTPATIENT
Start: 2017-10-14 | End: 2017-10-18

## 2017-10-14 RX ORDER — VALSARTAN 320 MG/1
160 TABLET ORAL 2 TIMES DAILY
Status: DISCONTINUED | OUTPATIENT
Start: 2017-10-14 | End: 2017-10-18

## 2017-10-14 RX ORDER — ONDANSETRON 2 MG/ML
4 INJECTION INTRAMUSCULAR; INTRAVENOUS EVERY 6 HOURS PRN
Status: DISCONTINUED | OUTPATIENT
Start: 2017-10-14 | End: 2017-10-18

## 2017-10-14 RX ORDER — AMLODIPINE BESYLATE 5 MG/1
5 TABLET ORAL DAILY
Status: DISCONTINUED | OUTPATIENT
Start: 2017-10-14 | End: 2017-10-18

## 2017-10-14 RX ORDER — SODIUM CHLORIDE 9 MG/ML
INJECTION, SOLUTION INTRAVENOUS CONTINUOUS
Status: ACTIVE | OUTPATIENT
Start: 2017-10-14 | End: 2017-10-14

## 2017-10-14 NOTE — PROGRESS NOTES
tamsulosin HCl (FLOMAX) cap 0.4 mg is Non-Formulary Medication &  Auto-Substituted to Alfuzosin 10mg daily Per P&T PROTOCOL

## 2017-10-14 NOTE — PLAN OF CARE
Problem: Patient/Family Goals  Goal: Patient/Family Long Term Goal  Patient's Long Term Goal: To go home    Interventions:  - Nausea and vomiting under control.  - Blood work and electrolytes normalized.   - See additional Care Plan goals for specific inter Enhance eating environment  Outcome: Progressing

## 2017-10-14 NOTE — ED PROVIDER NOTES
Patient Seen in: [facility] AND [facility] Emergency Department    History   Patient presents with:  Vomiting    Stated Complaint: vomiting    HPI    47-year-old male with recent urinary catheterization for retention following the urologist with a recent diagnos Conjunctivae are normal. Pupils are equal, round, and reactive to light. Neck: Normal range of motion. Neck supple. Cardiovascular: Normal rate, regular rhythm and intact distal pulses. Pulmonary/Chest: Effort normal. No respiratory distress.   Clear Abnormality         Status                     ---------                               -----------         ------                     CBC W/ DIFFERENTIAL[[id number]]          Abnormal            Final result                 Please view res output and urinalysis. 2. Symmetric enhancement of bilateral kidneys with no definite ureteral calculi or obstructive uropathy.         ADDITIONAL FINDINGS   Possible small decompressed bladder diverticulum along the posterolateral left bladder wall contai adult    Disposition:  Admit    Follow-up:  No follow-up provider specified.     Medications Prescribed:  Current Discharge Medication List        Present on Admission  Date Reviewed: 10/9/2017          ICD-10-CM Noted POA    Acute cystitis without hematuri

## 2017-10-14 NOTE — ED NOTES
Leg bag emptied, 200 ml urine out. Patient sent to floor. Belongings sent. No distress.  Cipro IVPB infusing

## 2017-10-14 NOTE — H&P
[facility]    History and Physical    Brie Elisabet.  Patient Status:  Inpatient    1940 MRN [id number]   Location [facility] 5SW/SE Attending Kaya Zamudio MD   Hosp Day # 0 PCP Rodri Vickers MD     Date:  10/14/2017  Da MCG Oral Tab Take 1 tablet by mouth daily. Vitamin D3 1000 units Oral Tab Take 1,000 Units by mouth daily. omega-3 fatty acids 1000 MG Oral Cap Take 1,000 mg by mouth daily.    mupirocin 2 % External Ointment Apply 1 Application topically 2 (two) times --------------------------       Assessment/Plan:   Hematemesis with intractable vomiting  Admit/ gi consult/ ppi/ iv zofran/ppi/monitor labs  Acute on chronic renal insufficiency  ivf's  Htn/gout  possibkle uti  Await cultures/ hold off on antibiotics

## 2017-10-14 NOTE — PROGRESS NOTES
Pharmacy Note: Dietary Supplement Discontinuation Per Policy    omega-3 fatty acids (FISH OIL) has been discontinued on [address]. per policy. This supplement may be restarted upon discharge using the medication reconciliation process.     Thank you,

## 2017-10-15 RX ORDER — 0.9 % SODIUM CHLORIDE 0.9 %
VIAL (ML) INJECTION
Status: COMPLETED
Start: 2017-10-15 | End: 2017-10-15

## 2017-10-15 RX ORDER — MAGNESIUM OXIDE 400 MG (241.3 MG MAGNESIUM) TABLET
400 TABLET ONCE
Status: COMPLETED | OUTPATIENT
Start: 2017-10-15 | End: 2017-10-15

## 2017-10-15 NOTE — PROGRESS NOTES
[facility]    Progress Note    Mary Hayes.  Patient Status:  Inpatient    1940 MRN [id number]   Location [facility] 5SW/SE Attending Kenneth Santa MD   Hosp Day # 1 PCP Francisco Hardin MD     Subjective:     Respirator BUN 20 10/14/2017    10/14/2017   K 3.9 10/15/2017    10/14/2017   CO2 26 10/14/2017    (H) 10/14/2017   CA 8.9 10/14/2017   ALB 3.3 (L) 10/14/2017   ALKPHO 39 10/14/2017   BILT 0.5 10/14/2017   TP 5.9 10/14/2017   AST 12 (L) 10/14/201 require inpatient services that will reasonably be expected to span two midnight's based on the clinical documentation in H+P. Based on patients current state of illness, I anticipate that, after discharge, patient will require TBD.         Roger Renteria,

## 2017-10-15 NOTE — PLAN OF CARE
Problem: Patient/Family Goals  Goal: Patient/Family Long Term Goal  Patient's Long Term Goal: To go home    Interventions:  - Nausea and vomiting under control.  - Blood work and electrolytes normalized.   - See additional Care Plan goals for specific inter Outcome: Progressing

## 2017-10-15 NOTE — PHYSICAL THERAPY NOTE
PHYSICAL THERAPY EVALUATION - INPATIENT     Room Number: 533/533-A  Evaluation Date: 10/15/2017  Type of Evaluation: Initial  Physician Order: PT Eval and Treat    Presenting Problem: weakness, vomiting, nausea  Reason for Therapy: Mobility Dysfunction Problem List  Principal Problem:    Acute cystitis without hematuria  Active Problems:    Nausea and vomiting in adult      Past Medical History  Past Medical History:   Diagnosis Date   • Back problem    • Enlarged prostate    • Essential hypertension Approx Degree of Impairment Score: 61.29%   Standardized Score (AM-PAC Scale): 38.1   CMS Modifier (G-Code): CL    FUNCTIONAL ABILITY STATUS  Gait Assessment   Gait Assistance:  Moderate assistance  Distance (ft): 5 feet  Assistive Device: Rolling walker  P

## 2017-10-15 NOTE — PLAN OF CARE
Problem: Patient/Family Goals  Goal: Patient/Family Long Term Goal  Patient's Long Term Goal: To go home    Interventions:  - Nausea and vomiting under control.  - Blood work and electrolytes normalized.   - See additional Care Plan goals for specific inter WT and lab values  - Obtain nutritional consult as needed  - Optimize oral hygiene and moisture  - Encourage food from home; allow for food preferences  - Enhance eating environment   Outcome: Progressing

## 2017-10-15 NOTE — OCCUPATIONAL THERAPY NOTE
OCCUPATIONAL THERAPY EVALUATION - INPATIENT     Room Number: 533/533-A  Evaluation Date: 10/15/2017  Type of Evaluation: Initial  Presenting Problem: intractable vomitting    Physician Order: IP Consult to Occupational Therapy  Reason for Therapy: ADL/IADL vomiting in adult      Past Medical History  Past Medical History:   Diagnosis Date   • Back problem    • Enlarged prostate    • Essential hypertension    • Gout    • High blood pressure        Past Surgical History  History reviewed.  No pertinent surgical body clothing?: A Little  -   Bathing (including washing, rinsing, drying)?: A Little  -   Toileting, which includes using toilet, bedpan or urinal? : A Little  -   Putting on and taking off regular upper body clothing?: A Little  -   Taking care of person

## 2017-10-16 ENCOUNTER — SURGERY (OUTPATIENT)
Age: 77
End: 2017-10-16

## 2017-10-16 ENCOUNTER — APPOINTMENT (OUTPATIENT)
Dept: ULTRASOUND IMAGING | Facility: HOSPITAL | Age: 77
DRG: 369 | End: 2017-10-16
Attending: INTERNAL MEDICINE
Payer: MEDICARE

## 2017-10-16 PROBLEM — K92.0 HEMATEMESIS: Status: ACTIVE | Noted: 2017-10-16

## 2017-10-16 PROCEDURE — 99232 SBSQ HOSP IP/OBS MODERATE 35: CPT | Performed by: INTERNAL MEDICINE

## 2017-10-16 PROCEDURE — 76705 ECHO EXAM OF ABDOMEN: CPT | Performed by: INTERNAL MEDICINE

## 2017-10-16 PROCEDURE — 0DJ08ZZ INSPECTION OF UPPER INTESTINAL TRACT, VIA NATURAL OR ARTIFICIAL OPENING ENDOSCOPIC: ICD-10-PCS | Performed by: INTERNAL MEDICINE

## 2017-10-16 RX ORDER — POLYETHYLENE GLYCOL 3350 17 G/17G
17 POWDER, FOR SOLUTION ORAL DAILY
Status: DISCONTINUED | OUTPATIENT
Start: 2017-10-16 | End: 2017-10-18

## 2017-10-16 RX ORDER — MAGNESIUM OXIDE 400 MG (241.3 MG MAGNESIUM) TABLET
400 TABLET ONCE
Status: COMPLETED | OUTPATIENT
Start: 2017-10-16 | End: 2017-10-16

## 2017-10-16 RX ORDER — POTASSIUM CHLORIDE 20 MEQ/1
40 TABLET, EXTENDED RELEASE ORAL EVERY 4 HOURS
Status: COMPLETED | OUTPATIENT
Start: 2017-10-16 | End: 2017-10-16

## 2017-10-16 RX ORDER — MIDAZOLAM HYDROCHLORIDE 1 MG/ML
INJECTION INTRAMUSCULAR; INTRAVENOUS
Status: DISCONTINUED | OUTPATIENT
Start: 2017-10-16 | End: 2017-10-16 | Stop reason: HOSPADM

## 2017-10-16 NOTE — H&P
PRE-PROCEDURE UPDATE    HPI: Heather Lugo. is a 64year old male. 12/21/1940. Patient presents for an Esophagogastroduodenoscopy. Seen inpatient for intractable nausea and emesis and one episode of hematemesis. See note for details.     ALLERGIES:

## 2017-10-16 NOTE — PROGRESS NOTES
[facility]    Progress Note    Faviola Leiva.  Patient Status:  Inpatient    1940 MRN [id number]   Location [facility] 5SW/SE Attending Misti Nath MD   Hosp Day # 2 PCP Margeret Claude, MD     Subjective:     Respirator with current care,              Results:       Lab Results  Component Value Date   WBC 8.2 10/16/2017   HGB 11.7 (L) 10/16/2017   HCT 33.8 (L) 10/16/2017    10/16/2017   CREATSERUM 1.38 10/16/2017   BUN 13 10/16/2017    10/16/2017   K 4.1 10/1

## 2017-10-16 NOTE — CONSULTS
[facility] HOSP - [facility]    Report of Consultation    [place].  Patient Status:  Inpatient    1940 MRN [id number]   Location [facility] 5SW/SE Attending Delwin Claude, MD   Hosp Day # 2 PCP Jaquan Norton MD     Date of Admission: tab 10 mg 10 mg Oral Daily   valsartan (DIOVAN) tab 160 mg 160 mg Oral BID   0.45% NaCl infusion  Intravenous Continuous   Pantoprazole Sodium (PROTONIX) EC tab 40 mg 40 mg Oral QAM AC   ondansetron HCl (ZOFRAN) injection 4 mg 4 mg Intravenous Q6H PRN temperature 98.1 °F (36.7 °C), temperature source Oral, resp. rate 18, height 5' 11\" (1.803 m), weight 175 lb (79.4 kg), SpO2 94 %.   GENERAL: well developed, in no apparent distress  SKIN: no rashes,no suspicious lesions  HEENT: atraumatic, normocephalic, calcifications. Dictated by (CST): Cj Quinones MD on 10/10/2017 at 18:02     Approved by (CST): Cj Quinones MD on 10/10/2017 at 18:04          CONCLUSION:  1. No convincing acute right ankle fracture or dislocation.  2. Findings suggesting chronic Areas scanned: Right upper quadrant. FINDINGS:  LIVER:   Multiple hepatic cysts once again visualized, the largest measuring 3.1 cm in segment V. Hepatic veins and main portal vein are patent with appropriately directed flow.  Normal hepatic parenchymal ec COMPARISON: None. INDICATIONS: N18.9 Chronic kidney disease, unspecified N17.9 Acute kidney failure, unspecified  TECHNIQUE: Ultrasound examination was performed to visualize the kidneys and bladder.    FINDINGS:  RIGHT KIDNEY: Normal.  Right kidney length Scattered mild degenerative change within the spine. OTHER: Negative. Dictated by (CST): Maksim Hou MD on 10/13/2017 at 20:56     Approved by (CST): Maksim Hou MD on 10/13/2017 at 20:57          CONCLUSION: No acute cardiopulmonary abnormality. ABDOMINAL WALL: Bilateral fat containing inguinal hernias. URINARY BLADDER: Azul catheter within the bladder. Markedly abnormal thickened bladder wall and/or isodense debris/hemorrhage within the bladder lumen.  Posterior lateral diverticulum left posterio spondylosis. Moderate chronic appearing compression fracture at the L2 level with minimal retropulsion posterior vertebral body margin into the central canal. 7. Small hiatal hernia.             Impression:   Mr Pau Charlton is a 63 y/o male that presents with raheem

## 2017-10-16 NOTE — PLAN OF CARE
Problem: Patient/Family Goals  Goal: Patient/Family Long Term Goal  Patient's Long Term Goal: To go home    Interventions:  - Nausea and vomiting under control.  - Blood work and electrolytes normalized.   - See additional Care Plan goals for specific inter preferences  - Enhance eating environment   Outcome: Progressing  IVF

## 2017-10-16 NOTE — PROCEDURES
ESOPHAGOGASTRODUODENOSCOPY REPORT    Patient Name:  Gabby May Record #: [id number]  YOB: 1940  Date of Procedure: 10/16/2017    Referring physician: Bryant Bassett MD    Surgeon:  Paola Clifton MD    Pre-op diagnosis: nausea, daily  Will start patient on miralax daily for noted constipation  Clear liquids ok and advance as tolerated    Specimens: none  EBL: none

## 2017-10-16 NOTE — PLAN OF CARE
GASTROINTESTINAL - ADULT    • Minimal or absence of nausea and vomiting Progressing    • Maintains or returns to baseline bowel function Progressing    • Maintains adequate nutritional intake (undernourished) Progressing          Patient/Family Goals    •

## 2017-10-17 PROCEDURE — 99223 1ST HOSP IP/OBS HIGH 75: CPT | Performed by: UROLOGY

## 2017-10-17 PROCEDURE — 99232 SBSQ HOSP IP/OBS MODERATE 35: CPT | Performed by: INTERNAL MEDICINE

## 2017-10-17 RX ORDER — MAGNESIUM OXIDE 400 MG (241.3 MG MAGNESIUM) TABLET
400 TABLET ONCE
Status: COMPLETED | OUTPATIENT
Start: 2017-10-17 | End: 2017-10-17

## 2017-10-17 NOTE — OCCUPATIONAL THERAPY NOTE
OCCUPATIONAL THERAPY TREATMENT NOTE - INPATIENT     Room Number: 533/533-A          Presenting Problem: intractable vomitting    Problem List  Principal Problem:    Acute cystitis without hematuria  Active Problems:    Balance problem    Essential hyperten regular lower body clothing?: A Little  -   Bathing (including washing, rinsing, drying)?: A Little  -   Toileting, which includes using toilet, bedpan or urinal? : A Little  -   Putting on and taking off regular upper body clothing?: A Little  -   Taking

## 2017-10-17 NOTE — CM/SW NOTE
SW met w/ pt and pt's wife to discuss discharge planning. Pt lives w/ wife in a first floor condo. Pt reported having a cane, walker, and wheelchair. Pt reported no services at this time. Pt and wife requesting rehab at discharge.  Wife requesting referrals

## 2017-10-17 NOTE — CONSULTS
[facility] HOSP - [facility]    Report of Consultation    Yahaira Little.  Patient Status:  Inpatient    1940 MRN [id number]   Location [facility] 5SW/SE Attending Martinez Robles MD   Hosp Day # 3 PCP Elisa Lehman MD     Date of Admission: REVIEW   8/28/2017 visit with Dr. Jennett Schwab; gait difficulties, bradykinesia and increased tone, especially in the axial muscles; suggesting Parkinson's disease.  10/4/2017 Office visit with Dr. Bere Ott; \"follow-up after he was seen in ER for acute milena  MG per tab 1 tablet 1 tablet Oral BID   finasteride (PROSCAR) tab 5 mg 5 mg Oral Daily   Cholecalciferol (VITAMIN D) 1000 units tab 1,000 Units 1,000 Units Oral Daily   clotrimazole-betamethasone (LOTRISONE) cream 1 Application 1 Application Topical normocephalic  Eyes/Vision: no scleral icterus  Neck/Thyroid: neck is supple without adenopathy  Lymphatic: no abnormal cervical, supraclavicular or inguinal  adenopathy is noted  Respiratory: normal to inspection lungs--fine atelectatic rales in the bases 1.62* 1.38   BUNCREA 17.4 14.4 12.4 14.6 12.3 9.4*   GFRAA 36* 44* 57* 57* 50* 60   GFRNAA 29* 36* 47* 47* 41* 50*       Urinalysis Results (last three years):    Recent Labs   10/03/17  1206 10/07/17  1206 10/13/17  2022   COLORUR Yellow Yellow Yellow   C 7:36              Result Date: 10/11/2017  PROCEDURE:  KIDNEY/BLADDER (ZPI=[id number])  COMPARISON: [facility], [facility]. for [facility],  KIDNEY/BLADDER ([id number]=[id number]), 9/27/2017, 14:28.   INDICATIONS: Hydronephrosis  TECHNIQUE: Ultrasound examination was perfo Slight asymmetric decrease in size of the right kidney. 2. Simple-appearing left renal cyst. 3. Marked distention of the urinary bladder with a large posteriorly directed diverticulum. 4. Extensive post void residual urinary bladder volume of 1194 mL. contracted gallbladder. BILIARY: No visible dilatation or calcification. SPLEEN: No enlargement or focal lesion. STOMACH: Small hiatal hernia. Duodenum unremarkable. PANCREAS: No lesion, fluid collection, ductal dilatation, or atrophy.   ADRENALS: No defi 10/14/2017 at 7:43     Approved by (CST): hCerry Pepe MD on 10/14/2017 at 8:0    CONCLUSION:  1. Markedly abnormal bladder wall thickening with isodense intraluminal bladder contents that may be related to proteinaceous material, hemorrhage and/or bladder which cannot be easily corrected. 6.  Renal failure--much improved after Azul catheter placement; This admission, creatinine has improved and GFR has improved    7.   Bilateral small renal cysts, up to 1.7 cm in size CT 10/13/17 = asymptomatic;

## 2017-10-17 NOTE — CM/SW NOTE
Met with patient at bedside to explain the BPCI/Medicare program. Patient agreed with phone follow up for 3 months from [address] [place] [address] after discharge from [facility]. Patient was enrolled under DRG    690  . BPCI/Medicare letter and brochure provided.

## 2017-10-17 NOTE — PHYSICAL THERAPY NOTE
PHYSICAL THERAPY TREATMENT NOTE - INPATIENT    Room Number: 533/533-A       Presenting Problem: weakness, vomiting, nausea    Problem List  Principal Problem:    Acute cystitis without hematuria  Active Problems:    Balance problem    Essential hypertensi A Lot    AM-PAC Score:  Raw Score: 17   PT Approx Degree of Impairment Score: 50.57%   Standardized Score (AM-PAC Scale): 42.13   CMS Modifier (G-Code): CK    FUNCTIONAL ABILITY STATUS  Gait Assessment   Gait Assistance:  Moderate assistance  Distance (ft):

## 2017-10-17 NOTE — PROGRESS NOTES
[facility]    Progress Note    Klarissa Steinberg.  Patient Status:  Inpatient    1940 MRN [id number]   Location [facility] 5SW/SE Attending Ernesto Kinsey MD   Hosp Day # 3 PCP Chau Fernandez MD     Subjective:     Respirator due to parkinson's, but with worsening recently, pt worried he wuill fall also wife concerned, pt seen and recommending rehab,        HTN- continue with current care,     One more day to watch hg  how he is tolerating diet and most likely can be dc'd to re

## 2017-10-17 NOTE — PROGRESS NOTES
[facility]  Gastroenterology Progress Note    Gilberto Jobs.  Patient Status:  Inpatient    1940 MRN [id number]   Location [facility] 5SW/SE Attending Ingris Calhoun MD   Hosp Day # 3 PCP Osvaldo Campbell MD     Subjective:  Claudean Dowdy

## 2017-10-17 NOTE — PLAN OF CARE
Problem: Patient/Family Goals  Goal: Patient/Family Long Term Goal  Patient's Long Term Goal: To go home    Interventions:  - Nausea and vomiting under control.  - Blood work and electrolytes normalized.   - See additional Care Plan goals for specific inter Progressing      Problem: SAFETY ADULT - FALL  Goal: Free from fall injury  INTERVENTIONS:  - Assess pt frequently for physical needs  - Identify cognitive and physical deficits and behaviors that affect risk of falls.   - [place] fall precautions as gabby

## 2017-10-18 ENCOUNTER — TELEPHONE (OUTPATIENT)
Dept: SURGERY | Facility: CLINIC | Age: 77
End: 2017-10-18

## 2017-10-18 VITALS
HEART RATE: 69 BPM | OXYGEN SATURATION: 96 % | DIASTOLIC BLOOD PRESSURE: 64 MMHG | WEIGHT: 181 LBS | HEIGHT: 71 IN | SYSTOLIC BLOOD PRESSURE: 135 MMHG | RESPIRATION RATE: 18 BRPM | BODY MASS INDEX: 25.34 KG/M2 | TEMPERATURE: 98 F

## 2017-10-18 PROBLEM — N30.00 ACUTE CYSTITIS WITHOUT HEMATURIA: Status: RESOLVED | Noted: 2017-10-14 | Resolved: 2017-10-18

## 2017-10-18 PROBLEM — K92.0 HEMATEMESIS: Status: RESOLVED | Noted: 2017-10-16 | Resolved: 2017-10-18

## 2017-10-18 PROBLEM — R11.2 NAUSEA AND VOMITING IN ADULT: Status: RESOLVED | Noted: 2017-10-14 | Resolved: 2017-10-18

## 2017-10-18 PROCEDURE — 99239 HOSP IP/OBS DSCHRG MGMT >30: CPT | Performed by: INTERNAL MEDICINE

## 2017-10-18 PROCEDURE — 51725 SIMPLE CYSTOMETROGRAM: CPT | Performed by: UROLOGY

## 2017-10-18 PROCEDURE — 99233 SBSQ HOSP IP/OBS HIGH 50: CPT | Performed by: UROLOGY

## 2017-10-18 RX ORDER — PANTOPRAZOLE SODIUM 40 MG/1
40 TABLET, DELAYED RELEASE ORAL
Qty: 14 TABLET | Refills: 0 | Status: ON HOLD | OUTPATIENT
Start: 2017-10-19 | End: 2017-12-04

## 2017-10-18 RX ORDER — MAGNESIUM OXIDE 400 MG (241.3 MG MAGNESIUM) TABLET
400 TABLET ONCE
Status: COMPLETED | OUTPATIENT
Start: 2017-10-18 | End: 2017-10-18

## 2017-10-18 NOTE — PROGRESS NOTES
[facility] AND [facility]  Gastroenterology Progress Note    Rebecca Morning.  Patient Status:  Inpatient    1940 MRN [id number]   Location [facility] 5SW/SE Attending Yefri Gleason MD   Hosp Day # 4 PCP Justice Watt MD     Subjective:  [place]

## 2017-10-18 NOTE — TELEPHONE ENCOUNTER
Neo  = I completed simple CMG test at the bedside; bladder sensory, tone, motor function intact; patient did have a detrusor/bladder muscle contraction which is encouraging, however, the contraction was weak. Recommend maintaining Azul catheter until significant increase in strength at rehab facility; patient may need office cystoscopy as an outpatient. Decision to remove Azul catheter for voiding trial will be made at a future date, once makes progress at rehab.   A few days before several days before removal of Azul catheter, would need repeat urine culture and start appropriate antibiotics (as dictated by urine culture results at that time) 1--3 days before catheter removal

## 2017-10-18 NOTE — DISCHARGE SUMMARY
DISCHARGE SUMMARY     Omar CoronaAsia  Patient Status:  Inpatient    1940 MRN [id number]   Location [facility] 5SW/SE Attending Lidia Hernandez MD   Hosp Day # 4 PCP Bree Ott MD     Date of Admission: 10/13/2017  Date of Discharge: 10/ auscultation bilaterally  Cardiovascular: S1, S2 normal, no murmur, click, rub or gallop, regular rate and rhythm    Procedures during hospitalization:   •     Incidental or significant findings and recommendations (brief descriptions):   •     Lab/Test res Plan:  Discharge Condition: Good    Current Discharge Medication List    Home Meds - Unchanged    carbidopa-levodopa  MG Oral Tab  Take 1 tablet by mouth 2 (two) times daily.     tamsulosin HCl 0.4 MG Oral Cap  Take 1/2 hour following the same meal ea

## 2017-10-18 NOTE — PLAN OF CARE
Problem: Patient/Family Goals  Goal: Patient/Family Long Term Goal  Patient's Long Term Goal: To go home    Interventions:  - Nausea and vomiting under control.  - Blood work and electrolytes normalized.   - See additional Care Plan goals for specific inter development  - Assess and document skin integrity  - Monitor for areas of redness and/or skin breakdown  - Initiate interventions, skin care algorithm/standards of care as needed   Outcome: Progressing  Intact  monitoring    Problem: Patient Centered Care

## 2017-10-18 NOTE — CM/SW NOTE
SW was informed that pt is able to d/c today    RN requesting 1p d/c time  Clint [place] agreeable w/ d/c time  SW met w/ pt and pt's wife; agreeable w/ d/c and Medic[facility] cost. SW provided IM letter.   RAQUEL contacted [facility] for [employer] [facility]

## 2017-10-18 NOTE — PLAN OF CARE
Problem: Patient/Family Goals  Goal: Patient/Family Long Term Goal  Patient's Long Term Goal: To go home    Interventions:  - Nausea and vomiting under control.  - Blood work and electrolytes normalized.   - See additional Care Plan goals for specific inter Outcome: Adequate for Discharge      Problem: SAFETY ADULT - FALL  Goal: Free from fall injury  INTERVENTIONS:  - Assess pt frequently for physical needs  - Identify cognitive and physical deficits and behaviors that affect risk of falls.   - [place] fa

## 2017-10-18 NOTE — PROGRESS NOTES
Gita Meadows. is a 64year old male. HPI:   Patient presents with:  Vomiting      History provided by patient and chart review.     Admitted for severe episode of gastritis.     Recent urinary retention; 10/3/17 [address] [place] [address] ER;   sent because a outpatient ki Flomax patient doing okay some irritation at the tip of the penis where a Azul goes in the bladder is nondistended anymore, ultrasound did show hydronephrosis on the right side to we will need to see the urologist.  I evaluated patient office consult 10/9 that consult 10/17/17, prostate was 1-2+ enlarged, 30--35 g without palpable nodules.     Laboratory Data:    10/7/17 urine culture greater than 100,000 Acinetobacter baumannii--resistant to nitrofurantoin, Zosyn  10/07/2017 Urine culture = >100,000 CFU/ML [facility], CT ABDOMEN PELVIS IV CONTRAST NO ORAL (ER), 10/13/2017, 22:00. INDICATIONS:    Intractable nausea  TECHNIQUE:   Limited evaluation of the areas indicated in the order with gray scale and colorflow of the main vessels where appropriate.   Areas sc Azul catheter into the urinary bladder. 2. Resolution of right hydronephrosis.      DICTATED BY (CST): PAKALNISKIS, Jillene Severance, MD ON 10/11/2017 AT 16:02     APPROVED BY (CST): PAKALNISKIS, Jillene Severance, MD ON 10/11/2017 AT 16:05           .     Result Date: 5 Normal.  No cardiac silhouette abnormality or cardiomegaly. Unremarkable pulmonary vasculature. MEDIAST/BEN:             Atherosclerotic aorta with no visible aneurysm. 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   Nancy Salas or calcification. AORTA/VASCULAR:    Moderate calcific atherosclerosis. No aneurysm or dissection. RETROPERITONEUM: No mass or enlarged adenopathy. BOWEL/MESENTERY:   Scattered colonic diverticulosis without diverticulitis.  No visible mass, obst diverticulitis 3. Prostatomegaly. Correlate with PSA levels.  4. Multiple hepatic and renal cysts some of which are too small to characterize on this single phase exam. Dominant benign hepatic cyst left lobe liver and interpolar region left renal cortical c contraction is mildly weak      Decision to perform simple CMG made during the visit today.   Excluding the simple CMG,  I spent 35 minutes with patient, and majority of this time was spent discussing treatment options, answering questions about treatment a stronger, and this could be performed under local anesthesia in the office     4.   For now maintain Azul catheter since its placement and subsequent resolution of urinary retention ultimately led to resolution of right hydronephrosis;       5.  Will likel

## 2017-10-19 ENCOUNTER — TELEPHONE (OUTPATIENT)
Dept: INTERNAL MEDICINE CLINIC | Facility: CLINIC | Age: 77
End: 2017-10-19

## 2017-10-19 ENCOUNTER — TELEPHONE (OUTPATIENT)
Dept: SURGERY | Facility: CLINIC | Age: 77
End: 2017-10-19

## 2017-10-19 NOTE — TELEPHONE ENCOUNTER
Urology hospital visit note from today 10/18/17 =      History provided by patient and chart review.     Admitted for severe episode of gastritis.     Recent urinary retention; 10/3/17 [facility] ER;   sent because a outpatient kidney ultrasound 9/27/17 ordered fo irritation at the tip of the penis where a Azul goes in the bladder is nondistended anymore, ultrasound did show hydronephrosis on the right side to we will need to see the urologist.  I evaluated patient office consult 10/9/17; urinary retention emergenc was 1-2+ enlarged, 30--35 g without palpable nodules.     Laboratory Data:    10/7/17 urine culture greater than 100,000 Acinetobacter baumannii--resistant to nitrofurantoin, Zosyn  10/07/2017 Urine culture = >100,000 CFU/ML Acinetobacter baumannii complex; CONTRAST NO ORAL (ER), 10/13/2017, 22:00. INDICATIONS:    Intractable nausea  TECHNIQUE:   Limited evaluation of the areas indicated in the order with gray scale and colorflow of the main vessels where appropriate. Areas scanned: Right upper quadrant.   Pb No bladder. 2. Resolution of right hydronephrosis.      DICTATED BY (CST): Ambrosio VIVAR MD ON 10/11/2017 AT 16:02     APPROVED BY (CST): Ambrosio VIVAR MD ON 10/11/2017 AT 16:05           .     Result Date: 9/27/2017  PROCEDURE:   US KIDNEY abnormality or cardiomegaly. Unremarkable pulmonary vasculature. MEDIAST/BEN:             Atherosclerotic aorta with no visible aneurysm. 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   Linear atelectasis or scar in the le AORTA/VASCULAR:    Moderate calcific atherosclerosis. No aneurysm or dissection. RETROPERITONEUM: No mass or enlarged adenopathy. BOWEL/MESENTERY:   Scattered colonic diverticulosis without diverticulitis.  No visible mass, obstruction, or bowel w Prostatomegaly. Correlate with PSA levels.  4. Multiple hepatic and renal cysts some of which are too small to characterize on this single phase exam. Dominant benign hepatic cyst left lobe liver and interpolar region left renal cortical cyst. 5. Mildly con mildly weak      Decision to perform simple CMG made during the visit today.   Excluding the simple CMG,  I spent 25 minutes with patient, and majority of this time was spent discussing treatment options, answering questions about treatment and coordinating its placement and subsequent resolution of urinary retention ultimately led to resolution of right hydronephrosis;       5.  Will likely start treatment of the Acinetobacter bacteriuria likely about 2 days before cystoscopy    6.   Ideally should have urine

## 2017-10-19 NOTE — TELEPHONE ENCOUNTER
Pts spouse states Dr. Eula Alfonso saw pt at the hospital yesterday (10/18/17) and the day before and would like to know if there were any changes. Asking if Dr. Eula Alfonso would be able to see pt who is currently at [facility] located in [place].  Wife would like to

## 2017-10-19 NOTE — TELEPHONE ENCOUNTER
pts wife, Fransisca Le called. Please call,  She would like to speak with you regarding her husbands care.

## 2017-10-20 NOTE — TELEPHONE ENCOUNTER
Phoned pt's wife, Shakir Larson and spoke with her. She states she missed  when he visited pt in the hospital. She wanted to know if there was anything Hernando Taveras wanted to tell her. I reviewed with her your plan,  from t.e.  Dated 10/18/17 nain

## 2017-10-25 ENCOUNTER — LAB REQUISITION (OUTPATIENT)
Dept: LAB | Facility: HOSPITAL | Age: 77
End: 2017-10-25
Attending: INTERNAL MEDICINE
Payer: MEDICARE

## 2017-10-25 DIAGNOSIS — N13.30 HYDRONEPHROSIS: ICD-10-CM

## 2017-10-25 PROCEDURE — 80053 COMPREHEN METABOLIC PANEL: CPT | Performed by: INTERNAL MEDICINE

## 2017-10-25 PROCEDURE — 85025 COMPLETE CBC W/AUTO DIFF WBC: CPT | Performed by: INTERNAL MEDICINE

## 2017-11-07 ENCOUNTER — HOSPITAL ENCOUNTER (OUTPATIENT)
Dept: GENERAL RADIOLOGY | Facility: HOSPITAL | Age: 77
Discharge: HOME | End: 2017-11-07
Attending: UROLOGY
Payer: MEDICARE

## 2017-11-07 DIAGNOSIS — N13.30 HYDRONEPHROSIS, UNSPECIFIED HYDRONEPHROSIS TYPE: ICD-10-CM

## 2017-11-07 DIAGNOSIS — N19 RENAL FAILURE, UNSPECIFIED CHRONICITY: ICD-10-CM

## 2017-11-07 PROCEDURE — 74430 CONTRAST X-RAY BLADDER: CPT | Performed by: UROLOGY

## 2017-11-07 PROCEDURE — 51600 INJECTION FOR BLADDER X-RAY: CPT | Performed by: UROLOGY

## 2017-11-08 ENCOUNTER — OFFICE VISIT (OUTPATIENT)
Dept: INTERNAL MEDICINE CLINIC | Facility: CLINIC | Age: 77
End: 2017-11-08

## 2017-11-08 VITALS
DIASTOLIC BLOOD PRESSURE: 72 MMHG | BODY MASS INDEX: 24.22 KG/M2 | HEIGHT: 71 IN | TEMPERATURE: 99 F | HEART RATE: 65 BPM | SYSTOLIC BLOOD PRESSURE: 129 MMHG | WEIGHT: 173 LBS

## 2017-11-08 DIAGNOSIS — Z86.59: ICD-10-CM

## 2017-11-08 DIAGNOSIS — R33.9 URINARY RETENTION: ICD-10-CM

## 2017-11-08 DIAGNOSIS — Z09 HOSPITAL DISCHARGE FOLLOW-UP: Primary | ICD-10-CM

## 2017-11-08 DIAGNOSIS — R26.2 DIFFICULTY WALKING: ICD-10-CM

## 2017-11-08 PROCEDURE — 99213 OFFICE O/P EST LOW 20 MIN: CPT | Performed by: INTERNAL MEDICINE

## 2017-11-08 PROCEDURE — G0463 HOSPITAL OUTPT CLINIC VISIT: HCPCS | Performed by: INTERNAL MEDICINE

## 2017-11-08 NOTE — PATIENT INSTRUCTIONS
ASSESSMENT/PLAN:   Hospital discharge follow-up  (primary encounter diagnosis)- doing better over all ,  Urinary retention- clark in place, has follow up appointment on Friday with Urology  Difficulty walking- parkinson's disease, pt/ot  History of emotion

## 2017-11-08 NOTE — PROGRESS NOTES
HPI:    Patient ID: Marta Edmond. is a 64year old male. HPI  Here for follow-up after she was discharged from the [facility]   He seen is doing well his improving with therapy getting better Azul still in place is suppo Application topically 2 (two) times daily. Disp: 15 g Rfl: 0   AmLODIPine Besylate 5 MG Oral Tab Take 1 tablet (5 mg total) by mouth daily. Disp: 30 tablet Rfl: 2   valsartan 160 MG Oral Tab Take 160 mg by mouth 2 (two) times daily.    Disp:  Rfl:    [place] Genitourinary:   Genitourinary Comments: Azul in place    Musculoskeletal: Normal range of motion. He exhibits no edema or tenderness. Neurological: He is alert and oriented to person, place, and time. He has normal reflexes.    Skin: Skin is warm and

## 2017-11-10 ENCOUNTER — OFFICE VISIT (OUTPATIENT)
Dept: SURGERY | Facility: CLINIC | Age: 77
End: 2017-11-10

## 2017-11-10 VITALS
HEIGHT: 71 IN | HEART RATE: 78 BPM | SYSTOLIC BLOOD PRESSURE: 122 MMHG | WEIGHT: 183 LBS | RESPIRATION RATE: 16 BRPM | DIASTOLIC BLOOD PRESSURE: 70 MMHG | TEMPERATURE: 99 F | BODY MASS INDEX: 25.62 KG/M2

## 2017-11-10 DIAGNOSIS — N13.30 HYDRONEPHROSIS, UNSPECIFIED HYDRONEPHROSIS TYPE: Primary | ICD-10-CM

## 2017-11-10 DIAGNOSIS — R33.8 BENIGN PROSTATIC HYPERPLASIA WITH URINARY RETENTION: ICD-10-CM

## 2017-11-10 DIAGNOSIS — N39.0 RECURRENT UTI: ICD-10-CM

## 2017-11-10 DIAGNOSIS — N19 RENAL FAILURE, UNSPECIFIED CHRONICITY: ICD-10-CM

## 2017-11-10 DIAGNOSIS — N31.9 NEUROGENIC BLADDER: ICD-10-CM

## 2017-11-10 DIAGNOSIS — G20 PARKINSON'S DISEASE (HCC): ICD-10-CM

## 2017-11-10 DIAGNOSIS — N40.1 BENIGN PROSTATIC HYPERPLASIA WITH URINARY RETENTION: ICD-10-CM

## 2017-11-10 PROCEDURE — 51703 INSERT BLADDER CATH COMPLEX: CPT | Performed by: UROLOGY

## 2017-11-10 PROCEDURE — 99215 OFFICE O/P EST HI 40 MIN: CPT | Performed by: UROLOGY

## 2017-11-10 NOTE — PROGRESS NOTES
HPI:    Patient ID: Brie Bhandari. is a 64year old male. HPI     History provided by pt and wife Fransisca Le.     Urinary retention; 10/3/17 EM ER; [facility] because a outpatient kidney ultrasound 9/27/17 ordered for investigation of worsening kidney functio was drained clark was left in place was started on Flomax patient doing okay some irritation at the tip of the penis where a Clark goes in the bladder is nondistended anymore, ultrasound did show hydronephrosis on the right side to we will need to see the Quit date: 8/28/1962  Smokeless tobacco: Never Used                      Alcohol use:  No                        Current Outpatient Prescriptions:  Pantoprazole Sodium 40 MG Oral Tab EC Take 1 tablet (40 mg total) by mouth every morning before breakfas Psychiatric: He has a normal mood and affect. Thought content normal.   Nursing note and vitals reviewed.        11/10/17  1128   BP: 122/70   Pulse: 78   Resp: 16   Temp: 98.6 °F (37 °C)   TempSrc: Oral   Weight: 183 lb (83 kg)   Height: 5' 11\" (1.803 m complications, side effects, reasons for, nature of, alternatives of monthly catheter replacement vs self intermittent catheterization and I answered patient's questions on treatment; patient understands all of this and decides to have his catheter replace above.    Treatment Plan & Patient Instructions    1. Continue finasteride 5 mg daily    2.   For now stop tamsulosin until further notice; we might restart it sometime in the future, if we ever have an expectation of you being able to urinate normally onc

## 2017-11-10 NOTE — PROGRESS NOTES
Patient's name and  verified. Patient assisted to exam table without difficulty. Patient's urine in clark bag was clear yellow. Patient's clark catheter balloon deflated of it's entire contents.   Using a dean syringe, instill about 60 ml normal salin

## 2017-11-10 NOTE — PATIENT INSTRUCTIONS
1.  Continue finasteride 5 mg daily    2. For now stop tamsulosin until further notice; we might restart it sometime in the future, if we ever have an expectation of you being able to urinate normally once Azul catheter removed    3.   For now continue in home:  · Urinate as soon as you feel the urge. Don't try to hold your urine. · Don't limit your fluid intake during the day. Drink 6 to 8 glasses of water or liquids a day. This prevents bacteria from building up in the bladder.   · Avoid drinking fluids a professional medical care. Always follow your healthcare professional's instructions.

## 2017-11-27 ENCOUNTER — TELEPHONE (OUTPATIENT)
Dept: NEUROLOGY | Facility: CLINIC | Age: 77
End: 2017-11-27

## 2017-11-27 ENCOUNTER — OFFICE VISIT (OUTPATIENT)
Dept: NEUROLOGY | Facility: CLINIC | Age: 77
End: 2017-11-27

## 2017-11-27 VITALS
RESPIRATION RATE: 16 BRPM | SYSTOLIC BLOOD PRESSURE: 132 MMHG | WEIGHT: 188 LBS | BODY MASS INDEX: 26.32 KG/M2 | DIASTOLIC BLOOD PRESSURE: 86 MMHG | HEIGHT: 71 IN | HEART RATE: 88 BPM

## 2017-11-27 DIAGNOSIS — G20 PARKINSON'S DISEASE (HCC): Primary | ICD-10-CM

## 2017-11-27 DIAGNOSIS — G12.29 PSEUDOBULBAR PALSY (HCC): ICD-10-CM

## 2017-11-27 PROCEDURE — 99214 OFFICE O/P EST MOD 30 MIN: CPT | Performed by: OTHER

## 2017-11-27 RX ORDER — CARBIDOPA AND LEVODOPA 50; 200 MG/1; MG/1
1 TABLET, EXTENDED RELEASE ORAL 2 TIMES DAILY
Qty: 60 TABLET | Refills: 5 | Status: SHIPPED | OUTPATIENT
Start: 2017-11-27 | End: 2017-11-27

## 2017-11-27 RX ORDER — ASPIRIN 81 MG/1
81 TABLET ORAL DAILY
Qty: 30 TABLET | Refills: 3 | Status: SHIPPED | OUTPATIENT
Start: 2017-11-27 | End: 2019-05-22

## 2017-11-27 NOTE — PROGRESS NOTES
Misty Hernandez. : 1940     HPI:   Patient presents with:  Parkinson's: OJB: Patient is here with his wife following up on Parkinson's.  Patient's wife stated he is doing good since his last visit, she stated she has concerns about him be External Cream Apply 1 Application topically 2 (two) times daily. Disp: 15 g Rfl: 0   AmLODIPine Besylate 5 MG Oral Tab Take 1 tablet (5 mg total) by mouth daily. Disp: 30 tablet Rfl: 2   valsartan 160 MG Oral Tab Take 160 mg by mouth 2 (two) times daily. joint complaints upper or lower extremities  PSYCHE:no depression or anxiety  NEURO: As in HPI    EXAM:     Vitals:  /86 (BP Location: Right arm, Patient Position: Sitting, Cuff Size: adult)   Pulse 88   Resp 16   Ht 71\"   Wt 188 lb   BMI 26.22 kg/m in 1 month to notify me of his response to the medicine. Follow-up examination in 3-4 months would be suggested to monitor his condition. Thank you very much. Return in about 3 months (around 2/27/2018). Kirk Schultz.  Carla Salazar MD

## 2017-11-28 ENCOUNTER — OFFICE VISIT (OUTPATIENT)
Dept: CARDIOLOGY CLINIC | Facility: CLINIC | Age: 77
End: 2017-11-28

## 2017-11-28 VITALS — HEART RATE: 58 BPM | SYSTOLIC BLOOD PRESSURE: 126 MMHG | DIASTOLIC BLOOD PRESSURE: 82 MMHG

## 2017-11-28 DIAGNOSIS — R60.9 EDEMA, UNSPECIFIED TYPE: ICD-10-CM

## 2017-11-28 DIAGNOSIS — I10 ESSENTIAL HYPERTENSION: Primary | ICD-10-CM

## 2017-11-28 PROCEDURE — 99204 OFFICE O/P NEW MOD 45 MIN: CPT | Performed by: INTERNAL MEDICINE

## 2017-11-28 PROCEDURE — G0463 HOSPITAL OUTPT CLINIC VISIT: HCPCS | Performed by: INTERNAL MEDICINE

## 2017-11-28 NOTE — H&P
Marta Edmond. is a 64year old male. HPI:   This is a pleasant 12-year-old with hypertension and Parkinson's disease who has some mild leg swelling in his presently at [facility] for therapy with hopes of going home soon.   Her asked by the primary to s back spinal stenosis   • Enlarged prostate    • Essential hypertension    • Gout    • High blood pressure       Social History:  Smoking status: Former Smoker                                                              Packs/day: 0.00      Years: 0.00 well controlled we will continue the meds for now and follow. Other options can be considered if swelling has worsened. The patient indicates understanding of these issues and agrees to the plan. The patient is asked to return in 6 months.

## 2017-11-28 NOTE — PATIENT INSTRUCTIONS
Avoid added salt  To help with leg swelling keep feet elevated when sitting for long periods of time and try walking more often. If swelling persists consider supportive stockings.   Schedule echocardiogram  Get BMP blood test  Call if changes

## 2017-11-29 ENCOUNTER — TELEPHONE (OUTPATIENT)
Dept: INTERNAL MEDICINE CLINIC | Facility: CLINIC | Age: 77
End: 2017-11-29

## 2017-12-01 ENCOUNTER — TELEPHONE (OUTPATIENT)
Dept: SURGERY | Facility: CLINIC | Age: 77
End: 2017-12-01

## 2017-12-01 NOTE — TELEPHONE ENCOUNTER
Pt's spouse requesting to set up visits with nurse for pt to have catheter changes. Next change is due 12/12. Please call. Thank you.

## 2017-12-04 ENCOUNTER — APPOINTMENT (OUTPATIENT)
Dept: GENERAL RADIOLOGY | Facility: HOSPITAL | Age: 77
DRG: 516 | End: 2017-12-04
Attending: EMERGENCY MEDICINE
Payer: MEDICARE

## 2017-12-04 ENCOUNTER — HOSPITAL ENCOUNTER (INPATIENT)
Facility: HOSPITAL | Age: 77
LOS: 4 days | Discharge: NURSING, REHAB OR LONG-TERM CARE | DRG: 516 | End: 2017-12-11
Attending: EMERGENCY MEDICINE | Admitting: INTERNAL MEDICINE
Payer: MEDICARE

## 2017-12-04 DIAGNOSIS — S32.000A LUMBAR COMPRESSION FRACTURE, CLOSED, INITIAL ENCOUNTER (HCC): Primary | ICD-10-CM

## 2017-12-04 DIAGNOSIS — N30.00 ACUTE CYSTITIS WITHOUT HEMATURIA: ICD-10-CM

## 2017-12-04 PROCEDURE — 72100 X-RAY EXAM L-S SPINE 2/3 VWS: CPT | Performed by: EMERGENCY MEDICINE

## 2017-12-04 PROCEDURE — 72072 X-RAY EXAM THORAC SPINE 3VWS: CPT | Performed by: EMERGENCY MEDICINE

## 2017-12-04 PROCEDURE — 72170 X-RAY EXAM OF PELVIS: CPT | Performed by: EMERGENCY MEDICINE

## 2017-12-04 RX ORDER — MULTIPLE VITAMINS W/ MINERALS TAB 9MG-400MCG
1 TAB ORAL DAILY
Status: DISCONTINUED | OUTPATIENT
Start: 2017-12-05 | End: 2017-12-05

## 2017-12-04 RX ORDER — AMLODIPINE BESYLATE 5 MG/1
5 TABLET ORAL DAILY
Status: DISCONTINUED | OUTPATIENT
Start: 2017-12-05 | End: 2017-12-05

## 2017-12-04 RX ORDER — CARBIDOPA AND LEVODOPA 25; 100 MG/1; MG/1
1 TABLET, EXTENDED RELEASE ORAL 2 TIMES DAILY
Status: DISCONTINUED | OUTPATIENT
Start: 2017-12-04 | End: 2017-12-05

## 2017-12-04 RX ORDER — POTASSIUM CHLORIDE 20 MEQ/1
40 TABLET, EXTENDED RELEASE ORAL ONCE
Status: COMPLETED | OUTPATIENT
Start: 2017-12-04 | End: 2017-12-04

## 2017-12-04 RX ORDER — ACETAMINOPHEN 325 MG/1
650 TABLET ORAL EVERY 6 HOURS PRN
Status: DISCONTINUED | OUTPATIENT
Start: 2017-12-04 | End: 2017-12-05

## 2017-12-04 RX ORDER — OMEPRAZOLE 20 MG/1
20 CAPSULE, DELAYED RELEASE ORAL DAILY
COMMUNITY
End: 2018-01-12 | Stop reason: ALTCHOICE

## 2017-12-04 RX ORDER — HYDROCODONE BITARTRATE AND ACETAMINOPHEN 5; 325 MG/1; MG/1
1 TABLET ORAL EVERY 6 HOURS PRN
Status: DISCONTINUED | OUTPATIENT
Start: 2017-12-04 | End: 2017-12-11

## 2017-12-04 RX ORDER — VALSARTAN 320 MG/1
160 TABLET ORAL 2 TIMES DAILY
Status: DISCONTINUED | OUTPATIENT
Start: 2017-12-04 | End: 2017-12-11

## 2017-12-04 RX ORDER — ACETAMINOPHEN 325 MG/1
650 TABLET ORAL EVERY 6 HOURS PRN
COMMUNITY

## 2017-12-04 RX ORDER — FINASTERIDE 5 MG/1
5 TABLET, FILM COATED ORAL DAILY
Status: DISCONTINUED | OUTPATIENT
Start: 2017-12-05 | End: 2017-12-05

## 2017-12-04 RX ORDER — CLOTRIMAZOLE 1 %
CREAM (GRAM) TOPICAL 2 TIMES DAILY
Status: DISCONTINUED | OUTPATIENT
Start: 2017-12-04 | End: 2017-12-05

## 2017-12-04 RX ORDER — MORPHINE SULFATE 2 MG/ML
1 INJECTION, SOLUTION INTRAMUSCULAR; INTRAVENOUS EVERY 4 HOURS PRN
Status: DISCONTINUED | OUTPATIENT
Start: 2017-12-04 | End: 2017-12-11

## 2017-12-04 NOTE — ED INITIAL ASSESSMENT (HPI)
Pt brought to ER with c/o lower back pain s/p fall on Saturday. Pt with hx of spinal stenosis and Parkinsons. Pt on baby asa. Wife denies hitting his head. Pt discharged from [facility] rehab 11-30-17.  Pt was dx with UTI on October and was hospitalized here

## 2017-12-05 ENCOUNTER — APPOINTMENT (OUTPATIENT)
Dept: GENERAL RADIOLOGY | Facility: HOSPITAL | Age: 77
DRG: 516 | End: 2017-12-05
Attending: ORTHOPAEDIC SURGERY
Payer: MEDICARE

## 2017-12-05 ENCOUNTER — SURGERY (OUTPATIENT)
Age: 77
End: 2017-12-05

## 2017-12-05 ENCOUNTER — MED REC SCAN ONLY (OUTPATIENT)
Dept: INTERNAL MEDICINE CLINIC | Facility: CLINIC | Age: 77
End: 2017-12-05

## 2017-12-05 ENCOUNTER — ANESTHESIA EVENT (OUTPATIENT)
Dept: SURGERY | Facility: HOSPITAL | Age: 77
DRG: 516 | End: 2017-12-05
Payer: MEDICARE

## 2017-12-05 ENCOUNTER — ANESTHESIA (OUTPATIENT)
Dept: SURGERY | Facility: HOSPITAL | Age: 77
DRG: 516 | End: 2017-12-05
Payer: MEDICARE
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
## 2017-12-05 PROBLEM — M80.08XA AGE-RELATED OSTEOPOROSIS WITH CURRENT PATHOL FRACTURE OF VERTEBRA, INITIAL ENCOUNTER (HCC): Status: ACTIVE | Noted: 2017-12-05

## 2017-12-05 PROBLEM — S32.030A CLOSED COMPRESSION FRACTURE OF L3 LUMBAR VERTEBRA: Status: ACTIVE | Noted: 2017-12-04

## 2017-12-05 PROCEDURE — 76001 XR C-ARM FLUORO >1 HOUR  (CPT=76001): CPT | Performed by: ORTHOPAEDIC SURGERY

## 2017-12-05 PROCEDURE — 0QU03JZ SUPPLEMENT LUMBAR VERTEBRA WITH SYNTHETIC SUBSTITUTE, PERCUTANEOUS APPROACH: ICD-10-PCS | Performed by: ORTHOPAEDIC SURGERY

## 2017-12-05 PROCEDURE — 0QS03ZZ REPOSITION LUMBAR VERTEBRA, PERCUTANEOUS APPROACH: ICD-10-PCS | Performed by: ORTHOPAEDIC SURGERY

## 2017-12-05 PROCEDURE — 72100 X-RAY EXAM L-S SPINE 2/3 VWS: CPT | Performed by: ORTHOPAEDIC SURGERY

## 2017-12-05 PROCEDURE — 99222 1ST HOSP IP/OBS MODERATE 55: CPT | Performed by: INTERNAL MEDICINE

## 2017-12-05 RX ORDER — BUPIVACAINE HYDROCHLORIDE AND EPINEPHRINE 5; 5 MG/ML; UG/ML
INJECTION, SOLUTION PERINEURAL AS NEEDED
Status: DISCONTINUED | OUTPATIENT
Start: 2017-12-05 | End: 2017-12-05 | Stop reason: HOSPADM

## 2017-12-05 RX ORDER — MORPHINE SULFATE 10 MG/ML
6 INJECTION, SOLUTION INTRAMUSCULAR; INTRAVENOUS EVERY 10 MIN PRN
Status: DISCONTINUED | OUTPATIENT
Start: 2017-12-05 | End: 2017-12-05 | Stop reason: HOSPADM

## 2017-12-05 RX ORDER — CYANOCOBALAMIN (VITAMIN B-12) 1000 MCG
1 TABLET, EXTENDED RELEASE ORAL 2 TIMES DAILY WITH MEALS
Status: DISCONTINUED | OUTPATIENT
Start: 2017-12-05 | End: 2017-12-11

## 2017-12-05 RX ORDER — ACETAMINOPHEN 325 MG/1
650 TABLET ORAL EVERY 6 HOURS PRN
Status: DISCONTINUED | OUTPATIENT
Start: 2017-12-05 | End: 2017-12-11

## 2017-12-05 RX ORDER — AMLODIPINE BESYLATE 5 MG/1
5 TABLET ORAL DAILY
Status: DISCONTINUED | OUTPATIENT
Start: 2017-12-05 | End: 2017-12-11

## 2017-12-05 RX ORDER — ROCURONIUM BROMIDE 10 MG/ML
INJECTION, SOLUTION INTRAVENOUS AS NEEDED
Status: DISCONTINUED | OUTPATIENT
Start: 2017-12-05 | End: 2017-12-05 | Stop reason: SURG

## 2017-12-05 RX ORDER — PANTOPRAZOLE SODIUM 20 MG/1
20 TABLET, DELAYED RELEASE ORAL
Status: DISCONTINUED | OUTPATIENT
Start: 2017-12-06 | End: 2017-12-11

## 2017-12-05 RX ORDER — ONDANSETRON 2 MG/ML
4 INJECTION INTRAMUSCULAR; INTRAVENOUS EVERY 6 HOURS PRN
Status: DISCONTINUED | OUTPATIENT
Start: 2017-12-05 | End: 2017-12-11

## 2017-12-05 RX ORDER — MORPHINE SULFATE 2 MG/ML
2 INJECTION, SOLUTION INTRAMUSCULAR; INTRAVENOUS EVERY 10 MIN PRN
Status: DISCONTINUED | OUTPATIENT
Start: 2017-12-05 | End: 2017-12-05 | Stop reason: HOSPADM

## 2017-12-05 RX ORDER — ONDANSETRON 2 MG/ML
4 INJECTION INTRAMUSCULAR; INTRAVENOUS ONCE AS NEEDED
Status: DISCONTINUED | OUTPATIENT
Start: 2017-12-05 | End: 2017-12-05 | Stop reason: HOSPADM

## 2017-12-05 RX ORDER — HYDROCODONE BITARTRATE AND ACETAMINOPHEN 5; 325 MG/1; MG/1
2 TABLET ORAL AS NEEDED
Status: DISCONTINUED | OUTPATIENT
Start: 2017-12-05 | End: 2017-12-05 | Stop reason: HOSPADM

## 2017-12-05 RX ORDER — MORPHINE SULFATE 4 MG/ML
4 INJECTION, SOLUTION INTRAMUSCULAR; INTRAVENOUS EVERY 10 MIN PRN
Status: DISCONTINUED | OUTPATIENT
Start: 2017-12-05 | End: 2017-12-05 | Stop reason: HOSPADM

## 2017-12-05 RX ORDER — LIDOCAINE HYDROCHLORIDE 10 MG/ML
INJECTION, SOLUTION EPIDURAL; INFILTRATION; INTRACAUDAL; PERINEURAL AS NEEDED
Status: DISCONTINUED | OUTPATIENT
Start: 2017-12-05 | End: 2017-12-05 | Stop reason: SURG

## 2017-12-05 RX ORDER — ASPIRIN 81 MG/1
81 TABLET ORAL DAILY
Status: DISCONTINUED | OUTPATIENT
Start: 2017-12-05 | End: 2017-12-06

## 2017-12-05 RX ORDER — HYDROMORPHONE HYDROCHLORIDE 1 MG/ML
0.2 INJECTION, SOLUTION INTRAMUSCULAR; INTRAVENOUS; SUBCUTANEOUS EVERY 5 MIN PRN
Status: DISCONTINUED | OUTPATIENT
Start: 2017-12-05 | End: 2017-12-05 | Stop reason: HOSPADM

## 2017-12-05 RX ORDER — SODIUM CHLORIDE 9 MG/ML
INJECTION, SOLUTION INTRAVENOUS CONTINUOUS
Status: DISCONTINUED | OUTPATIENT
Start: 2017-12-05 | End: 2017-12-06

## 2017-12-05 RX ORDER — HYDROMORPHONE HYDROCHLORIDE 1 MG/ML
0.4 INJECTION, SOLUTION INTRAMUSCULAR; INTRAVENOUS; SUBCUTANEOUS EVERY 5 MIN PRN
Status: DISCONTINUED | OUTPATIENT
Start: 2017-12-05 | End: 2017-12-05 | Stop reason: HOSPADM

## 2017-12-05 RX ORDER — SODIUM CHLORIDE, SODIUM LACTATE, POTASSIUM CHLORIDE, CALCIUM CHLORIDE 600; 310; 30; 20 MG/100ML; MG/100ML; MG/100ML; MG/100ML
INJECTION, SOLUTION INTRAVENOUS CONTINUOUS
Status: DISCONTINUED | OUTPATIENT
Start: 2017-12-05 | End: 2017-12-05 | Stop reason: HOSPADM

## 2017-12-05 RX ORDER — HYDROMORPHONE HYDROCHLORIDE 1 MG/ML
0.6 INJECTION, SOLUTION INTRAMUSCULAR; INTRAVENOUS; SUBCUTANEOUS EVERY 5 MIN PRN
Status: DISCONTINUED | OUTPATIENT
Start: 2017-12-05 | End: 2017-12-05 | Stop reason: HOSPADM

## 2017-12-05 RX ORDER — CLOTRIMAZOLE AND BETAMETHASONE DIPROPIONATE 10; .64 MG/G; MG/G
1 CREAM TOPICAL 2 TIMES DAILY
Status: DISCONTINUED | OUTPATIENT
Start: 2017-12-05 | End: 2017-12-07

## 2017-12-05 RX ORDER — 0.9 % SODIUM CHLORIDE 0.9 %
VIAL (ML) INJECTION
Status: COMPLETED
Start: 2017-12-05 | End: 2017-12-05

## 2017-12-05 RX ORDER — HALOPERIDOL 5 MG/ML
0.25 INJECTION INTRAMUSCULAR ONCE AS NEEDED
Status: DISCONTINUED | OUTPATIENT
Start: 2017-12-05 | End: 2017-12-05 | Stop reason: HOSPADM

## 2017-12-05 RX ORDER — HYDROCODONE BITARTRATE AND ACETAMINOPHEN 5; 325 MG/1; MG/1
1 TABLET ORAL AS NEEDED
Status: DISCONTINUED | OUTPATIENT
Start: 2017-12-05 | End: 2017-12-05 | Stop reason: HOSPADM

## 2017-12-05 RX ORDER — MULTIPLE VITAMINS W/ MINERALS TAB 9MG-400MCG
1 TAB ORAL DAILY
Status: DISCONTINUED | OUTPATIENT
Start: 2017-12-05 | End: 2017-12-11

## 2017-12-05 RX ORDER — NALOXONE HYDROCHLORIDE 0.4 MG/ML
80 INJECTION, SOLUTION INTRAMUSCULAR; INTRAVENOUS; SUBCUTANEOUS AS NEEDED
Status: DISCONTINUED | OUTPATIENT
Start: 2017-12-05 | End: 2017-12-05 | Stop reason: HOSPADM

## 2017-12-05 RX ORDER — BISACODYL 10 MG
10 SUPPOSITORY, RECTAL RECTAL
Status: DISCONTINUED | OUTPATIENT
Start: 2017-12-05 | End: 2017-12-11

## 2017-12-05 RX ORDER — LOSARTAN POTASSIUM 100 MG/1
100 TABLET ORAL DAILY
Status: DISCONTINUED | OUTPATIENT
Start: 2017-12-05 | End: 2017-12-11

## 2017-12-05 RX ORDER — FINASTERIDE 5 MG/1
5 TABLET, FILM COATED ORAL DAILY
Status: DISCONTINUED | OUTPATIENT
Start: 2017-12-05 | End: 2017-12-05

## 2017-12-05 RX ORDER — CHOLECALCIFEROL (VITAMIN D3) 125 MCG
500 CAPSULE ORAL DAILY
Status: DISCONTINUED | OUTPATIENT
Start: 2017-12-05 | End: 2017-12-11

## 2017-12-05 RX ADMIN — SODIUM CHLORIDE: 9 INJECTION, SOLUTION INTRAVENOUS at 21:39:00

## 2017-12-05 RX ADMIN — ROCURONIUM BROMIDE 5 MG: 10 INJECTION, SOLUTION INTRAVENOUS at 20:48:00

## 2017-12-05 RX ADMIN — LIDOCAINE HYDROCHLORIDE 30 MG: 10 INJECTION, SOLUTION EPIDURAL; INFILTRATION; INTRACAUDAL; PERINEURAL at 20:49:00

## 2017-12-05 RX ADMIN — SODIUM CHLORIDE: 9 INJECTION, SOLUTION INTRAVENOUS at 20:44:00

## 2017-12-05 NOTE — CONSULTS
[facility]    PATIENT'S NAME: [place] Carlita   ATTENDING PHYSICIAN: Royer Jordan MD   CONSULTING PHYSICIAN: Clair Felton MD   PATIENT ACCOUNT#:   [id number]    LOCATION:  5SWSE [facility] #:   [id number]       DATE OF GALINA multivitamin, valsartan, vitamin B12, vitamin D3, and fish oil. ALLERGIES:  Codeine and Lexapro; codeine makes him lethargic. SOCIAL HISTORY:  He is retired and lives with his wife. Again, uses a walker and no longer drives.   He quit smoking in 17 rationale for the coil over straight cement.   I discussed risks such as death, heart attack, stroke, bleeding, infection, blood clot, extravasation of cement, adjacent level fracture, dural leak, nerve injury, and paralysis as well as persistent pain despi

## 2017-12-05 NOTE — H&P
[facility] HOSP - [facility]    History and Physical    Rebecca Morning.  Patient Status:  Observation    1940 MRN [id number]   Location [facility] 5SW/SE Attending Yefri Gleason MD   Hosp Day # 0 PCP Justice Watt MD     Date:  2017  D up.  Escitalopram                Prescriptions Prior to Admission:  aspirin EC 81 MG Oral Tab EC Take 1 tablet (81 mg total) by mouth daily. carbidopa-levodopa  MG Oral Tab Take 2 tablets by mouth 2 (two) times daily.    Pantoprazole Sodium 40 MG Or regular rhythm. Pulmonary/Chest: Effort normal and breath sounds normal.   Abdominal: Soft. Bowel sounds are normal.   Musculoskeletal: Normal range of motion.    Low back pain,will difficulty moving    Neurological: He is oriented to person, place, and 20:12:33 inferior infarct pattern new frequent atrial and ventricular ectopic beats new Electronically signed on 12/04/2017 at 16:23 by Yeni Alba MD      Assessment/Plan:     Lumbar compression fracture, closed, initial encounter [facility])- will get ortho o

## 2017-12-05 NOTE — CONSULTS
Dx: L3 VCF for kyphoplasty this afternoon. Full consult dictated. Seen with wife. Dr. Ines Peres notified. Thank you.

## 2017-12-05 NOTE — ED PROVIDER NOTES
Patient Seen in: [facility] 5sw/se    History   Patient presents with:  Fall (musculoskeletal, neurologic)  Back Pain (musculoskeletal)    Stated Complaint: Acute cystitis without hematuria    HPI    The patient is a 58-year-old male with a histor air)    Current:/43   Pulse 95   Temp 99.1 °F (37.3 °C) (Oral)   Resp 20   Ht 180.3 cm (5' 11\")   Wt 79.1 kg   SpO2 93%   BMI 24.32 kg/m²         Physical Exam   Constitutional: He is oriented to person, place, and time.  He appears well-developed an following:     Clarity Urine Cloudy (*)     Protein Urine 30  (*)     Ketones Urine Trace (*)     Blood Urine Small (*)     Nitrite Urine Positive (*)     Leukocyte Esterase Urine Large (*)     WBC Urine 419 (*)     RBC URINE 17 (*)     Bacteria Urine Few Radiology findings: Xr Pelvis (1 View) (cpt=72170)    Result Date: 12/4/2017  CONCLUSION: Degenerative changes without acute fracture or dislocation.         Xr Routine Thoracic Spine (3 Views) (cpt=72072)    Result Date: 12/4/2017  CONCLUSION: Jaxson Krishnamurthy without hematuria N30.00 12/4/2017     Balance problem R26.89 6/25/2017 Yes    Benign prostatic hyperplasia with nocturia N40.1, R35.1 Unknown Yes    Difficulty walking R26.2 6/25/2017 Yes    Essential hypertension I10 6/25/2017 Yes    History of emotional

## 2017-12-05 NOTE — SLP NOTE
Pt NPO for surgery at time of SLP attempt. RN Cheryl Renae to call SLP if patient able to participate in PO trials today. Otherwise, SLP to follow post surgery as appropriate.    Thank you,  Danae Goins MA, [id number] [facility]  Speech-Language Pathologist  Geetha

## 2017-12-05 NOTE — PROGRESS NOTES
[facility]    Progress Note    Rebecca Morning.  Patient Status:  Observation    1940 MRN [id number]   Location [facility] 5SW/SE Attending Yefri Gleason MD   Hosp Day # 0 PCP Justice Watt MD     Subjective:     HENT: Ne hyperplasia with nocturia- with clark in plac,e on meds           Neurogenic bladder- due to parkinson's           Urinary retention-clark in place, on medication,                 Acute cystitis without hematuria- will start on Rocephin and will await cx, compared with ECG of 10/13/2017 20:12:33 inferior infarct pattern new frequent atrial and ventricular ectopic beats new Electronically signed on 12/04/2017 at 16:23 by Elinda Olszewski, MD Erby Mellow, MD  12/5/2017

## 2017-12-05 NOTE — PLAN OF CARE
Problem: Patient Centered Care  Goal: Patient preferences are identified and integrated in the patient's plan of care  Interventions:  - What would you like us to know as we care for you?   - Provide timely, complete, and accurate information to patient/fa

## 2017-12-06 PROCEDURE — 99232 SBSQ HOSP IP/OBS MODERATE 35: CPT | Performed by: INTERNAL MEDICINE

## 2017-12-06 RX ORDER — ASPIRIN 81 MG/1
81 TABLET ORAL DAILY
Status: DISCONTINUED | OUTPATIENT
Start: 2017-12-06 | End: 2017-12-11

## 2017-12-06 RX ORDER — ASPIRIN 81 MG/1
81 TABLET, CHEWABLE ORAL DAILY
Status: DISCONTINUED | OUTPATIENT
Start: 2017-12-06 | End: 2017-12-06

## 2017-12-06 NOTE — ANESTHESIA POSTPROCEDURE EVALUATION
Patient: Adin Wadsworth.     Procedure Summary     Date:  12/05/17 Room / Location:  [facility] OR  / [facility] OR    Anesthesia Start:  2044 Anesthesia Stop:      Procedure:  KYPHOPLASTY (N/A ) Diagnosis:       Lumbar compression fracture, closed, initial

## 2017-12-06 NOTE — BRIEF OP NOTE
Pre-Operative Diagnosis: 1) L3 compression fracture, closed, initial encounter ([facility] [address] [S32.030A], 2) Age related osteoporosis with current pathologic fx of vertebra, 3) fall at home     Post-Operative Diagnosis: Same     Procedure Performed: L3 VERTEBRAL

## 2017-12-06 NOTE — SLP NOTE
ADULT SWALLOWING EVALUATION    ASSESSMENT    ASSESSMENT/OVERALL IMPRESSION:  Pt seen sitting upright in bed for all PO trials for BSSE. Pt with good oral acceptance and bilabial seal across all trials. No anterior loss of bolus on any consistency given.  Carla Mabry MEDICAL HISTORY  Reason for Referral: R/O aspiration    Problem List  Principal Problem:    Closed compression fracture of L3 lumbar vertebra (HCC)  Active Problems:    Difficulty walking    Balance problem    Neurological muscle weakness    Essential hy clinically.  Videofluoroscopic Swallow Study is required to rule-out silent aspiration.)    Esophageal Phase of Swallow: No complaints consistent with possible esophageal involvement    GOALS  Goal #1 The patient will tolerate general consistency and thin l

## 2017-12-06 NOTE — TELEPHONE ENCOUNTER
Spoke with pt's spouse shraddha and determined that pt went back into the hospital and is possibly going home again today but she is not positive.  She states pt's cath is dure to be changed on 12/12 and states that she spoke with the [place] nurse and she told her

## 2017-12-06 NOTE — PROGRESS NOTES
[facility]    Progress Note    Prabhakar Mercedes.  Patient Status:  Observation    1940 MRN [id number]   Location [facility] 5SW/SE Attending Zaheer Drew MD   Hosp Day # 0 PCP Balta Rothman MD     Subjective:     Constitu 12/4/2017  CONCLUSION:   Progression of compression deformity of the L2 vertebral body with now near complete vertebral plana. Compression deformity of the superior endplate of L3 with approximately 25% loss of vertebral body height is new since 10/13/17.

## 2017-12-06 NOTE — OPERATIVE REPORT
[facility]    PATIENT'S NAME: Kane Riky   ATTENDING PHYSICIAN: Jose Cyr MD   OPERATING PHYSICIAN: Leda [facility] MD   PATIENT ACCOUNT#:   [id number]    LOCATION:  [address] [place] [address] #:   [id number]       DATE OF BIRTH were placed. Rocephin 1 g IV was given as a prophylaxis. SCDs were placed on both legs. General endotracheal anesthesia was performed Dr. Treva Rutherford. The patient was placed prone on 2 chest rolls and he was positioned properly.   Dual C-arm confirmed proper l

## 2017-12-06 NOTE — OCCUPATIONAL THERAPY NOTE
OCCUPATIONAL THERAPY EVALUATION - INPATIENT     Room Number: 561/561-A  Evaluation Date: 12/6/2017  Type of Evaluation: Initial  Presenting Problem:  (fall on 12/2 with L3 compression fx s/p kyphoplasty)    Physician Order: IP Consult to Occupational Thera problems    Acute cystitis without hematuria    Age-related osteoporosis with current pathol fracture of vertebra, initial encounter [facility])    Fall as cause of accidental injury in home as place of occurrence      Past Medical History  Past Medical History: LIVING ASSESSMENT  AM-PAC ‘6-Clicks’ Inpatient Daily Activity Short Form  How much help from another person does the patient currently need…  -   Putting on and taking off regular lower body clothing?: A Lot  -   Bathing (including washing, rinsing, drying

## 2017-12-06 NOTE — CM/SW NOTE
SW spoke w/ pt's wife/Rona in regards to discharge planning. Pt lives at home w/ wife in a 1 story home. WIfe stated pt uses a walker to ambulate.  Wife stated pt was recently discharge from [place] [address] on November 30th and was set up w/ Dawna Rader

## 2017-12-06 NOTE — ANESTHESIA PREPROCEDURE EVALUATION
Anesthesia PreOp Note    HPI:     Klarissa Steinberg. is a 64year old male who presents for preoperative consultation requested by: Anjana Peres MD    Date of Surgery: 12/4/2017 - 12/5/2017    Procedure(s):  KYPHOPLASTY  Indication: Lumbar compression carbidopa-levodopa  MG Oral Tab Take 2 tablets by mouth 2 (two) times daily. Disp: 120 tablet Rfl: 5 Taking   finasteride 5 MG Oral Tab Take 1 tablet (5 mg total) by mouth daily.  Disp: 30 tablet Rfl: 11 Taking   clotrimazole-betamethasone 1-0.05 % Ex HYDROcodone-acetaminophen (NORCO) 5-325 MG per tab 1 tablet 1 tablet Oral Q6H PRN Vika Martins MD 1 tablet at 12/04/17 1608   morphINE sulfate (PF) 2 MG/ML injection 1 mg 1 mg Intravenous Q4H PRN Vika Martins MD 1 mg at 12/05/17 0915     No current E height is 1.803 m (5' 11\") and weight is 79.1 kg (174 lb 6.4 oz). His oral temperature is 99.1 °F (37.3 °C). His blood pressure is 147/60 and his pulse is 95. His respiration is 18 and oxygen saturation is 94%.     12/05/17  0500 12/05/17  0855 12/05/17

## 2017-12-06 NOTE — PROGRESS NOTES
[facility] - [facility]    Progress Note    Chasitymele Blackwell.  Patient Status:  Observation    1940 MRN [id number]   Location [facility] 5SW/SE Attending Chelsey Moura MD   Hosp Day # 0 PCP Sanford Dykes MD     Subjective:     Constitu Results  Component Value Date   WBC 9.4 12/05/2017   HGB 11.3 (L) 12/05/2017   HCT 33.9 (L) 12/05/2017    12/05/2017   CREATSERUM 1.27 12/05/2017   BUN 16 12/05/2017    12/05/2017   K 3.9 12/05/2017    12/05/2017   CO2 26 12/05/2017   GL with ECG of 10/13/2017 20:12:33 inferior infarct pattern new frequent atrial and ventricular ectopic beats new Electronically signed on 12/04/2017 at 16:23 by Lurlean Simmonds, MD Yancy Keas, MD  12/6/2017

## 2017-12-06 NOTE — PHYSICAL THERAPY NOTE
PHYSICAL THERAPY EVALUATION - INPATIENT     Room Number: 561/561-A  Evaluation Date: 12/6/2017  Type of Evaluation: Initial  Physician Order: PT Eval and Treat    Presenting Problem: L3 comp fracture, s/p kyphoplasty  Reason for Therapy: Mobility Dysfu emotional problems    Acute cystitis without hematuria    Age-related osteoporosis with current pathol fracture of vertebra, initial encounter [facility])    Fall as cause of accidental injury in home as place of occurrence      Past Medical History  Past Medica wheelchair, bedside commode, etc.): A Little   -   Moving from lying on back to sitting on the side of the bed?: A Lot   How much help from another person does the patient currently need. ..   -   Moving to and from a bed to a chair (including a wheelchair) activity/exercise instructions provided to patient in preparation for discharge.    Goal #5   Current Status    Goal #6    Goal #6  Current Status

## 2017-12-07 ENCOUNTER — APPOINTMENT (OUTPATIENT)
Dept: GENERAL RADIOLOGY | Facility: HOSPITAL | Age: 77
DRG: 516 | End: 2017-12-07
Attending: INTERNAL MEDICINE
Payer: MEDICARE

## 2017-12-07 ENCOUNTER — TELEPHONE (OUTPATIENT)
Dept: NEUROLOGY | Facility: CLINIC | Age: 77
End: 2017-12-07

## 2017-12-07 PROBLEM — R50.9 FEVER: Status: ACTIVE | Noted: 2017-12-07

## 2017-12-07 PROBLEM — D72.829 ELEVATED WBC COUNT: Status: ACTIVE | Noted: 2017-12-07

## 2017-12-07 PROCEDURE — 71010 XR CHEST AP PORTABLE  (CPT=71010): CPT | Performed by: INTERNAL MEDICINE

## 2017-12-07 PROCEDURE — 99232 SBSQ HOSP IP/OBS MODERATE 35: CPT | Performed by: INTERNAL MEDICINE

## 2017-12-07 PROCEDURE — 99213 OFFICE O/P EST LOW 20 MIN: CPT | Performed by: UROLOGY

## 2017-12-07 RX ORDER — 0.9 % SODIUM CHLORIDE 0.9 %
VIAL (ML) INJECTION
Status: COMPLETED
Start: 2017-12-07 | End: 2017-12-07

## 2017-12-07 RX ORDER — CLOTRIMAZOLE AND BETAMETHASONE DIPROPIONATE 10; .64 MG/G; MG/G
CREAM TOPICAL 2 TIMES DAILY
Status: DISCONTINUED | OUTPATIENT
Start: 2017-12-07 | End: 2017-12-11

## 2017-12-07 NOTE — PROGRESS NOTES
[facility]    Progress Note    Kenneth Castro.  Patient Status:  Observation    1940 MRN [id number]   Location [facility] 5SW/SE Attending Angus Vega MD   Hosp Day # 0 PCP Shorty Ramirez MD       Subjective:   Wing Dance 10/13/2017   B12 1,171 (H) 09/20/2017       Xr Lumbar Spine (min 2 Views) (cpt=72100)    Result Date: 12/6/2017  CONCLUSION:  1. Postprocedural changes of L3 kyphoplasty vertebral augmentation. 2. Compression fracture deformity of L2.         Xr C-arm Fluo

## 2017-12-07 NOTE — PHYSICAL THERAPY NOTE
PHYSICAL THERAPY TREATMENT NOTE - INPATIENT    Room Number: 727/211-Y       Presenting Problem: L3 comp fracture, s/p kyphoplasty    Problem List   Principal Problem:    Closed compression fracture of L3 lumbar vertebra (HCC)  Active Problems:    Difficul Standing: Poor +  Dynamic Standing: Poor +      AM-PAC '6-Clicks' INPATIENT SHORT FORM - BASIC MOBILITY  How much difficulty does the patient currently have. ..  -   Turning over in bed (including adjusting bedclothes, sheets and blankets)?: A Lot   -   Sit with rw and cues.     Goal #3 Patient is able to ambulate 50   feet with assist device: walker - rolling at assistance level: minimum assistance   Goal #3   Current Status Pt amb 3' x 1 with rw and max cues and increased time, severely decreased stride jay

## 2017-12-07 NOTE — PROGRESS NOTES
[facility]    Progress Note    Sherle Shallow.  Patient Status:  Observation    1940 MRN [id number]   Location [facility] 5SW/SE Attending Jeremías García MD   Hosp Day # 0 PCP Nahomi Melara MD     Subjective:     Constitu cystitis without hematuria- on Rocephin  , repeat UA looks infection again, clark as per wife has not been changed since October, will change , will get Urology on board       Elevated WBC count- probably due to above, wound on back ok, cxr ok, [facility]

## 2017-12-07 NOTE — CM/SW NOTE
SW left VM for pt's wife/Rona to discuss discharge planning. SW to follow up when appropriate.     Major Renteria, [address]

## 2017-12-07 NOTE — PLAN OF CARE
Problem: Patient Centered Care  Goal: Patient preferences are identified and integrated in the patient's plan of care  Interventions:  - What would you like us to know as we care for you?   - Provide timely, complete, and accurate information to patient/fa locked, call light left within reach.

## 2017-12-07 NOTE — TELEPHONE ENCOUNTER
Spoke to patient's wife. She states that patient was admitted to hospital for compression fracture. He will be getting discharged and she wanted to make sure the dose on the medications were correct.  Explained that sinemet  mg should be 2 po BID and

## 2017-12-08 ENCOUNTER — APPOINTMENT (OUTPATIENT)
Dept: GENERAL RADIOLOGY | Facility: HOSPITAL | Age: 77
DRG: 516 | End: 2017-12-08
Attending: PHYSICIAN ASSISTANT
Payer: MEDICARE

## 2017-12-08 ENCOUNTER — APPOINTMENT (OUTPATIENT)
Dept: ULTRASOUND IMAGING | Facility: HOSPITAL | Age: 77
DRG: 516 | End: 2017-12-08
Attending: UROLOGY
Payer: MEDICARE

## 2017-12-08 PROCEDURE — 99233 SBSQ HOSP IP/OBS HIGH 50: CPT | Performed by: UROLOGY

## 2017-12-08 PROCEDURE — 76770 US EXAM ABDO BACK WALL COMP: CPT | Performed by: UROLOGY

## 2017-12-08 PROCEDURE — 74000 XR ABDOMEN (KUB) (1 AP VIEW)  (CPT=74000): CPT | Performed by: PHYSICIAN ASSISTANT

## 2017-12-08 PROCEDURE — 99232 SBSQ HOSP IP/OBS MODERATE 35: CPT | Performed by: INTERNAL MEDICINE

## 2017-12-08 RX ORDER — 0.9 % SODIUM CHLORIDE 0.9 %
VIAL (ML) INJECTION
Status: COMPLETED
Start: 2017-12-08 | End: 2017-12-08

## 2017-12-08 NOTE — CM/SW NOTE
Sw received phone call from wife/Rona who stated that she is still unsure what she would like for pt to do post d/c from hospital. Wife stated if she decides on SNF, she does not want pt to go back to [facility] and will look into other facilities.  If p

## 2017-12-08 NOTE — CONSULTS
[facility] ID CONSULT NOTE    Italia Quinteros.  Patient Status:  Observation    1940 MRN [id number]   Location [facility] 5SW/SE Attending Brittaney Low MD   Hosp Day # 0 PCP Dano Christian MD       Reason for alcohol. Allergies:    Codeine                 Other (See Comments)    Comment:Pt could not wake up.   Escitalopram                Medications:    Current Facility-Administered Medications:   •  Piperacillin Sod-Tazobactam So (ZOSYN) 3.375 g in dextrose breath, cough or sputum. GASTROINTESTINAL:  No anorexia, nausea, vomiting or diarrhea. No abdominal pain or blood. GENITOURINARY:  No Burning on urination.    NEUROLOGICAL:  No headache, dizziness, syncope, paralysis, ataxia, numbness or tingling in the e curve, wbc  -  Will start Zosyn today. -  FU KUB results. Constipation may contribute to fevers. -  CXR 12/7 unremarkable. Not having any URI symptoms.  -  Continue lotrisone for fungal balanitis.   -  Reviewed labs, micro, imaging reports, available old

## 2017-12-08 NOTE — CONSULTS
[facility] HOSP - [facility]    Report of Consultation    Timur Fernandez.  Patient Status:  Observation    1940 MRN [id number]   Location [facility] 5SW/SE Attending Jeremías García MD   Hosp Day # 0 PCP Nahomi Melara MD     Date of Admission Medications (In-patient):     Current Facility-Administered Medications:  Miconazole Nitrate 2 % powder  Topical [email or web address]   aspirin EC tab 81 mg 81 mg Oral Daily   CefTRIAXone Sodium (ROCEPHIN) 1 g in sodium chloride 0.9 % 100 mL IVPB 1 g Intraven Negative for cool extremity and irregular heartbeat/palpitations  Constitutional:  Negative for decreased activity, fever, irritability and lethargy  ENMT:  Negative for ear drainage, hearing loss and nasal drainage  Eyes:  Negative for eye discharge and v posthitis; slight erosion 6 o'clock position a meatus of urethra from Azul catheter  Perineum unremarkable. Normal anus, normal rectal tone, no rectal masses. Seminal vesicles are nonpalpable.     STOOL IMPACTION--none  PROSTATE 1-2+ enlarged, 30-35 g; m LEUUR Negative Trace* Moderate* Large* Large* Moderate*   UASA Negative Negative Negative Negative Negative Negative   WBCUR  --  3 21* 419* 140* 157*   RBCUR  --  13* 16* 17* 3 10*   BACUR  --  Many* Moderate* Few* Moderate* Few*         Imaging:     10 moderate right hydroureteronephrosis was noted; cystogram x-ray ordered; finasteride 5 mg started and tamsulosin continued.  10/18/2017 simple CMG test at [address] [place] [address]; bladder sensory, tone, motor function intact; patient did have detrusor/bladder muscle contractio possibility of recurrence. I am ordering kidney ultrasound on 12/7/17    5.)  Urinary retention; Azul catheter  Azul catheter changed 11/10/17 in the office; also changed today. 6.)  BPH--  On finasteride. I recommend continuing finasteride.     7).

## 2017-12-08 NOTE — PROGRESS NOTES
[facility]    Progress Note    Rosemary Haq.  Patient Status:  Observation    1940 MRN [id number]   Location [facility] 5SW/SE Attending Marvell Soulier, MD   Hosp Day # 0 PCP Benoit Tena MD       Subjective:   Chandler Camara 12/05/2017   CO2 26 12/05/2017   GLU 99 12/05/2017   CA 8.6 12/05/2017   ALB 3.0 (L) 10/25/2017   ALKPHO 61 10/25/2017   BILT 0.5 10/25/2017   TP 6.4 10/25/2017   AST 56 (H) 10/25/2017   ALT 32 10/25/2017   LIP 13 (L) 10/13/2017   MG 1.8 10/18/2017   PHOS

## 2017-12-08 NOTE — PLAN OF CARE
Absence of urinary retention Progressing    Azul catheter draining ping urine. Iv zosyn q 8 hours, d/c rocephin, ID consult, blood cx x 2, KUB x ray, cleanse urethra with hydrogen peroxide bid and apply lotrisone cream to head of penis.   Continue to mon

## 2017-12-08 NOTE — PLAN OF CARE
Problem: Patient Centered Care  Goal: Patient preferences are identified and integrated in the patient's plan of care  Interventions:  - What would you like us to know as we care for you?   - Provide timely, complete, and accurate information to patient/fa patient reports new pain  - Anticipate increased pain with activity and pre-medicate as appropriate  Outcome: Progressing      Problem: RISK FOR INFECTION - ADULT  Goal: Absence of fever/infection during anticipated neutropenic period  INTERVENTIONS  - Mon

## 2017-12-09 PROCEDURE — 99232 SBSQ HOSP IP/OBS MODERATE 35: CPT | Performed by: INTERNAL MEDICINE

## 2017-12-09 PROCEDURE — 99233 SBSQ HOSP IP/OBS HIGH 50: CPT | Performed by: UROLOGY

## 2017-12-09 RX ORDER — 0.9 % SODIUM CHLORIDE 0.9 %
VIAL (ML) INJECTION
Status: COMPLETED
Start: 2017-12-09 | End: 2017-12-09

## 2017-12-09 RX ORDER — POTASSIUM CHLORIDE 20 MEQ/1
40 TABLET, EXTENDED RELEASE ORAL EVERY 4 HOURS
Status: COMPLETED | OUTPATIENT
Start: 2017-12-09 | End: 2017-12-09

## 2017-12-09 RX ORDER — POTASSIUM CHLORIDE 20 MEQ/1
40 TABLET, EXTENDED RELEASE ORAL ONCE
Status: COMPLETED | OUTPATIENT
Start: 2017-12-09 | End: 2017-12-09

## 2017-12-09 RX ORDER — 0.9 % SODIUM CHLORIDE 0.9 %
3 VIAL (ML) INJECTION AS NEEDED
Status: DISCONTINUED | OUTPATIENT
Start: 2017-12-09 | End: 2017-12-11

## 2017-12-09 NOTE — PROGRESS NOTES
Christen Wharton. is a 64year old male.     HPI:   Patient presents with:  Fall (musculoskeletal, neurologic)  Back Pain (musculoskeletal)      History provided by  Patient and wife, who was present in room, and chart review    68-year-old with Parkinson's ADD-vantage, 3.375 g, Intravenous, Q8H  •  bacitracin-polymixin (POLYSPORIN) 500-94312 unit/g ointment, , Topical, Daily  •  Miconazole Nitrate 2 % powder, , Topical, [email or web address]  •  clotrimazole-betamethasone (LOTRISONE) cream, , Topical, BID  •  aspiri from [facility]  --- enterococcus greater than 100,000 + mixed organisms as well     12/4/17 urine culture greater than 100,000 Citrobacter freundii resistant to cefazolin but sensitive to ceftriaxone and other antimicrobials tested.   There was also 50,000 - 00 x10(3) uL Final   ----------  HGB   Date Value Ref Range Status   12/09/2017 11.0 (L) 13.5 - 17.5 g/dL Final   10/25/2017 12.5 (L) 13.0 - 17.0 g/dL Final   ----------  HCT   Date Value Ref Range Status   12/09/2017 33.2 (L) 41.0 - 52.0 % Final   10/25/2017 patient office consult 10/9/17; urinary retention emergency room 10/3/17 with patient having no sensation of any bladder pain or discomfort even though 1 L drained off bladder and patient came with [facility] catheter in place; has been started on tamsulosin in versus 12/7/17 CBC WBC 19,800 VS  12/8/17 WBC 20,900 12/9/17 leukocytosis improved with WBC 12,500 neutrophils 78%  12/7/17 urine culture from [facility]  --- enterococcus greater than 100,000 + mix of other organisms   kidney ultrasound --  no new hydronephros continue                     Afshan Card MD

## 2017-12-09 NOTE — PROGRESS NOTES
[facility]    Progress Note    Klarissa Steinberg.  Patient Status:  Inpatient    1940 MRN [id number]   Location [facility] 5SW/SE Attending Ernesto Kinsey MD   Hosp Day # 2 PCP Chau Fernandez MD     Subjective:     Jimbo

## 2017-12-09 NOTE — PROGRESS NOTES
Omar Corona. is a 64year old male.     HPI:   Patient presents with:  Fall (musculoskeletal, neurologic)  Back Pain (musculoskeletal)      History provided by  Patient and chart review    10-year-old with Parkinson's disease, admitted 12/4/17 because [email or web address]  •  clotrimazole-betamethasone (LOTRISONE) cream, , Topical, BID  •  aspirin EC tab 81 mg, 81 mg, Oral, Daily  •  carbidopa-levodopa (SINEMET)  MG per tab 1 tablet, 1 tablet, Oral, QID  •  bisacodyl (DULCOLAX) rectal suppository 10 mg, 00,000 enterococcus     CBC   12/8/17 WBC 20,900 neut 87%  12/7/17 WBC 19,800 neutrophil 87%  12/5/17 CBC 9400     Recent Labs   09/20/17  1432 10/03/17  1206 10/09/17  1538 10/13/17  2022 10/14/17  1028 10/16/17  0437 10/25/17  1622 12/04/17  1206 12/05/1 2017 264 140 - 400 K/UL Final   10/25/2017 279.0 150.0 - 450.0 10(3)uL Final   ----------      Imagin/8/17 kidney US --  No hydro    10/13/17 CT abdomen and pelvis with IV contrast = both kidneys have numerous cysts measuring up to 1.7 cm l tamsulosin continued. 10/18/2017 simple CMG test at [address] [place] [address]; bladder sensory, tone, motor function intact; patient did have detrusor/bladder muscle contraction which was weak.    Previous hydronephrosis, well and urinary retention, resolved after [facility] catheter enterococcus greater than 100,000; The citrobacter has been eliminated         4). History of right hydronephrosis which resolved after [facility] catheter inserted 10/3/17; 12/8/17 kidney ultrasound --  no new hydronephrosis.  Problem is stable.      5.)  Ur

## 2017-12-09 NOTE — PLAN OF CARE
Problem: Patient Centered Care  Goal: Patient preferences are identified and integrated in the patient's plan of care  Interventions:  - What would you like us to know as we care for you?   - Provide timely, complete, and accurate information to patient/fa unsuccessful or patient reports new pain  - Anticipate increased pain with activity and pre-medicate as appropriate   Outcome: Progressing  Patient denies pain    Problem: RISK FOR INFECTION - ADULT  Goal: Absence of fever/infection during anticipated neut Progressing      Problem: GENITOURINARY - ADULT  Goal: Absence of urinary retention  INTERVENTIONS:  - Assess patient’s ability to void and empty bladder  - Monitor intake/output and perform bladder scan as needed  - Follow urinary retention protocol/stand

## 2017-12-09 NOTE — PROGRESS NOTES
[facility] ID PROGRESS NOTE    lEva Salinas.  Patient Status:  Inpatient    1940 MRN [id number]   Location [facility] 5SW/SE Attending Cristiano Wong MD   Hosp Day # 2 PCP Brianna Galeana MD     Subjective:  Awake, afebrile, stating that he is h patient's wife. He states that he fell on his left side, with no LOC and had ongoing pain where  to difficulty walking so he was brought to the ED for further evaluation.  In the ED, WBC 14.1, T99.1, XR lumbar spine showing L3 compression fracture s/

## 2017-12-09 NOTE — PROGRESS NOTES
[facility]    Progress Note    Cece Courser.  Patient Status:  Inpatient    1940 MRN [id number]   Location [facility] 5SW/SE Attending Heather Mendoza MD   Hosp Day # 2 PCP Bryant Bassett MD     Subjective:     Jmibo               Acute cystitis without hematuria- on Rocephin  , repeat UA looks infection again, clark as per wife has not been changed since October, will change , will get Urology on board        Elevated WBC count- trending down        Fever- improved,

## 2017-12-09 NOTE — PROGRESS NOTES
[facility]    Progress Note    Sharron Myers.  Patient Status:  Inpatient    1940 MRN [id number]   Location [facility] 5SW/SE Attending Janelle Burroughs MD   Hosp Day # 1 PCP Karly Baez MD     Subjective:     Alyssiati HCT 35.2 (L) 12/08/2017    12/08/2017   CREATSERUM 1.27 12/05/2017   BUN 16 12/05/2017    12/05/2017   K 3.9 12/05/2017    12/05/2017   CO2 26 12/05/2017   GLU 99 12/05/2017   CA 8.6 12/05/2017   ALB 3.0 (L) 10/25/2017   ALKPHO 61 10/2

## 2017-12-09 NOTE — PLAN OF CARE
Problem: RISK FOR INFECTION - ADULT  Goal: Absence of fever/infection during anticipated neutropenic period  INTERVENTIONS  - Monitor WBC  - Administer growth factors as ordered  - Implement neutropenic guidelines   Outcome: Progressing  Afebrile,wbc count

## 2017-12-09 NOTE — PHYSICAL THERAPY NOTE
PHYSICAL THERAPY TREATMENT NOTE - INPATIENT    Room Number: 949/907-T       Presenting Problem: L3 compression fracture s/p kyphoplasty    Problem List  Principal Problem:    Closed compression fracture of L3 lumbar vertebra (HCC)  Active Problems:    Dif transfers and to improve strength, bed mobility, and gait for progress toward baseline. Therapist  recommends transition to skilled therapy environment for continued therapy when medically stable.  Patient in bedside chair with family present at end of sess (AM-PAC Scale): 38.1   CMS Modifier (G-Code): CL    FUNCTIONAL ABILITY STATUS  Gait Assessment   Gait Assistance: Minimum assistance (Continued max cues and increased time/difficulty pivoting)  Distance (ft): 3  Assistive Device: Rolling walker  Pattern: F

## 2017-12-10 PROBLEM — R19.5 LOOSE STOOLS: Status: ACTIVE | Noted: 2017-12-10

## 2017-12-10 PROCEDURE — 99232 SBSQ HOSP IP/OBS MODERATE 35: CPT | Performed by: INTERNAL MEDICINE

## 2017-12-10 PROCEDURE — 99232 SBSQ HOSP IP/OBS MODERATE 35: CPT | Performed by: UROLOGY

## 2017-12-10 NOTE — PROGRESS NOTES
[facility]    Progress Note    Cindy Brown.  Patient Status:  Inpatient    1940 MRN [id number]   Location [facility] 5SW/SE Attending Danish Smyth MD   Hosp Day # 3 PCP Masood Dickson MD     Subjective:     Alyssiati on Rocephin  , repeat UA looks infection again, clark as per wife has not been changed since October, will change , will get Urology on board        Elevated WBC count- trending down        Fever- improved,ID on board, Urology also on board,   bcx sent, al

## 2017-12-10 NOTE — PROGRESS NOTES
Keara Massey. is a 64year old male.     HPI:   Patient presents with:  Fall (musculoskeletal, neurologic)  Back Pain (musculoskeletal)      History provided by  Patient   and chart review    53-year-old with Parkinson's disease, admitted 12/4/17 [facility] 8/28/1964  Smokeless tobacco: Never Used                      Alcohol use:  No                 Medications :    Current Facility-Administered Medications:   •  Sodium Chloride 0.9 % solution 3 mL, 3 mL, Intravenous, PRN  •  Piperacillin Sod-Tazobactam So (Z having diarrhea but    not watery and 12/10/17 patient reports problem improved Urology    PHYSICAL EXAM:   Temperature 98.7.    /75  Flanks non- tender ; Abdomen mildly tympanitic.    Fungal balanitis improved  s/p initiation of Lotrisone cream thi <2.0 <2.0 <2.0 <2.0 <2.0 <2.0   LEUUR Negative Trace* Moderate* Large* Large* Moderate*   UASA Negative Negative Negative Negative Negative Negative   WBCUR  --  3 21* 419* 140* 157*   RBCUR  --  13* 16* 17* 3 10*   BACUR  --  Many* Moderate* Few* Moderate renal failure with hydronephrosis and urinary retention, Clark was placed morning on thousand cc was drained clark was left in place was started on Flomax patient doing okay some irritation at the tip of the penis where a Clark goes in the bladder is nondi 1. )  Febrile enterococcus UTI  Had developed new fevers while on IV ceftriaxone --but now while on IV Zosyn, fevers have resolved              12/7/17 temp 100.2    12/7/17 urine culture from [facility]  --- enterococcus greater than 100,000   12/8/17 gabrielle --  Recommend continuation     9) urethral meatitis --purulent drainage has improved significantly; nurses cleaning with hydrogen peroxide twice daily        RECOMMENDATIONS AND TREATMENT PLAN       1.  agree with IV Zosyn, although reconsider if continues

## 2017-12-10 NOTE — PLAN OF CARE
Problem: Patient/Family Goals  Goal: Patient/Family Long Term Goal  Patient's Long Term Goal: To be discharged home at baseline and walking. Interventions:  - Surgery as needed. - Pain medications prn.  - Therapy prn and as ordered.   - See additional C Progressing      Problem: DISCHARGE PLANNING  Goal: Discharge to home or other facility with appropriate resources  INTERVENTIONS:  - Identify barriers to discharge w/pt and caregiver  - Include patient/family/discharge partner in discharge planning  - Jennifer Soler

## 2017-12-11 VITALS
DIASTOLIC BLOOD PRESSURE: 70 MMHG | TEMPERATURE: 98 F | SYSTOLIC BLOOD PRESSURE: 140 MMHG | HEIGHT: 71 IN | OXYGEN SATURATION: 96 % | RESPIRATION RATE: 18 BRPM | WEIGHT: 174.38 LBS | HEART RATE: 82 BPM | BODY MASS INDEX: 24.41 KG/M2

## 2017-12-11 PROBLEM — R19.5 LOOSE STOOLS: Status: RESOLVED | Noted: 2017-12-10 | Resolved: 2017-12-11

## 2017-12-11 PROBLEM — R50.9 FEVER: Status: RESOLVED | Noted: 2017-12-07 | Resolved: 2017-12-11

## 2017-12-11 PROBLEM — D72.829 ELEVATED WBC COUNT: Status: RESOLVED | Noted: 2017-12-07 | Resolved: 2017-12-11

## 2017-12-11 PROCEDURE — 99239 HOSP IP/OBS DSCHRG MGMT >30: CPT | Performed by: INTERNAL MEDICINE

## 2017-12-11 RX ORDER — CYANOCOBALAMIN (VITAMIN B-12) 1000 MCG
1 TABLET, EXTENDED RELEASE ORAL 2 TIMES DAILY WITH MEALS
Qty: 60 TABLET | Refills: 2 | Status: ON HOLD | OUTPATIENT
Start: 2017-12-11 | End: 2018-11-03

## 2017-12-11 RX ORDER — HYDROCODONE BITARTRATE AND ACETAMINOPHEN 5; 325 MG/1; MG/1
1 TABLET ORAL EVERY 6 HOURS PRN
Qty: 60 TABLET | Refills: 0 | Status: SHIPPED | OUTPATIENT
Start: 2017-12-11 | End: 2018-02-27

## 2017-12-11 NOTE — PLAN OF CARE
Pt d/c to [address] [place] [address] with all of his belongings, via [id number] Discharge instructions reviewed with patient and his wife.

## 2017-12-11 NOTE — CM/SW NOTE
SW contacted pt's wife/Rona to follow up on discharge planning. Wife stated that she would like to go to a facility that MD goes to. Wife stated she was going to call MD office to clarify what facility MD goes to and will call SW to inform.     9:30AM: Pt'

## 2017-12-11 NOTE — PLAN OF CARE
Problem: Patient/Family Goals  Goal: Patient/Family Long Term Goal  Patient's Long Term Goal: To be discharged home at baseline and walking. Interventions:  - Pain medications prn.  - Therapy prn and as ordered.   - See additional Care Plan goals for spe Progressing      Problem: DISCHARGE PLANNING  Goal: Discharge to home or other facility with appropriate resources  INTERVENTIONS:  - Identify barriers to discharge w/pt and caregiver  - Include patient/family/discharge partner in discharge planning  - Nelida Valdez

## 2017-12-11 NOTE — DISCHARGE SUMMARY
DISCHARGE SUMMARY     Kenneth Castro.  Patient Status:  Inpatient    1940 MRN [id number]   Location [facility] 5SW/SE Attending Angus Vega MD   Hosp Day # 4 PCP Shorty Ramirez MD     Date of Admission: 2017  Date of Discharge:  ordered by urology to r/o hydronephrosis      Diarrhea-improved, will monitor in the rehab     Brief Synopsis:     Discharge Physical Exam: General appearance: alert, appears stated age and cooperative  Throat: lips, mucosa, and tongue normal; teeth and gu Quantity:  30 tablet  Refills:  3     B-12 500 MCG Tabs      Take 1 tablet by mouth daily. Refills:  0     carbidopa-levodopa  MG Tabs  Commonly known as:  SINEMET      Take 2 tablets by mouth 2 (two) times daily.    Stop taking on:  12/27/2017  Eliu Haq daily.    Miconazole Nitrate 2 % External Powder  Apply 1 Application topically 2 (two) times daily. Calcium Citrate-Vitamin D 315-250 MG-UNIT Oral Tab  Take 1 tablet by mouth 2 (two) times daily with meals.       Home Meds - Unchanged    acetaminophen 3

## 2017-12-11 NOTE — PLAN OF CARE
Problem: Patient Centered Care  Goal: Patient preferences are identified and integrated in the patient's plan of care  Interventions:  - What would you like us to know as we care for you?   - Provide timely, complete, and accurate information to patient/fa ADULT  Goal: Absence of fever/infection during anticipated neutropenic period  INTERVENTIONS  - Monitor WBC  - Administer growth factors as ordered  - Implement neutropenic guidelines   Outcome: Progressing      Problem: SAFETY ADULT - FALL  Goal: Free fro protocol/standard of care  - Consider collaborating with pharmacy to review patient's medication profile  - Implement strategies to promote bladder emptying   Outcome: Progressing      Comments: Continue to include patient and family in patient's care.  Gulshan Chen

## 2017-12-11 NOTE — PROGRESS NOTES
[facility] ID PROGRESS NOTE    Misty Hernandez.  Patient Status:  Inpatient    1940 MRN [id number]   Location [facility] 5SW/SE Attending Fiona Denise MD   Hosp Day # 4 PCP Heather Simon MD     Subjective:  Awake, stating he is have less BMs, a neurogenic bladder with chronic indwelling clark, BPH, and gout, who presented to the [address] [place] [address] ED after a recent fall. History was obtained by the patient and patient's wife.  He states that he fell on his left side, with no LOC and had ongoing pain where it nery

## 2017-12-12 ENCOUNTER — SNF/IP PROF CHARGE ONLY (OUTPATIENT)
Dept: INTERNAL MEDICINE CLINIC | Facility: CLINIC | Age: 77
End: 2017-12-12

## 2017-12-12 DIAGNOSIS — N40.1 BENIGN PROSTATIC HYPERPLASIA WITH NOCTURIA: ICD-10-CM

## 2017-12-12 DIAGNOSIS — N31.9 NEUROGENIC BLADDER: ICD-10-CM

## 2017-12-12 DIAGNOSIS — I10 ESSENTIAL HYPERTENSION: ICD-10-CM

## 2017-12-12 DIAGNOSIS — N30.00 ACUTE CYSTITIS WITHOUT HEMATURIA: ICD-10-CM

## 2017-12-12 DIAGNOSIS — R35.1 BENIGN PROSTATIC HYPERPLASIA WITH NOCTURIA: ICD-10-CM

## 2017-12-12 DIAGNOSIS — N48.1 BALANITIS: ICD-10-CM

## 2017-12-12 DIAGNOSIS — M62.81 NEUROLOGICAL MUSCLE WEAKNESS: ICD-10-CM

## 2017-12-12 DIAGNOSIS — S32.030K CLOSED COMPRESSION FRACTURE OF L3 LUMBAR VERTEBRA WITH NONUNION, SUBSEQUENT ENCOUNTER: ICD-10-CM

## 2017-12-12 PROCEDURE — 99305 1ST NF CARE MODERATE MDM 35: CPT | Performed by: INTERNAL MEDICINE

## 2017-12-13 PROCEDURE — 99308 SBSQ NF CARE LOW MDM 20: CPT | Performed by: INTERNAL MEDICINE

## 2017-12-14 ENCOUNTER — SNF VISIT (OUTPATIENT)
Dept: INTERNAL MEDICINE CLINIC | Facility: SKILLED NURSING FACILITY | Age: 77
End: 2017-12-14

## 2017-12-14 VITALS
DIASTOLIC BLOOD PRESSURE: 79 MMHG | WEIGHT: 174 LBS | TEMPERATURE: 97 F | RESPIRATION RATE: 18 BRPM | SYSTOLIC BLOOD PRESSURE: 144 MMHG | HEART RATE: 84 BPM | BODY MASS INDEX: 24 KG/M2

## 2017-12-14 DIAGNOSIS — N31.9 NEUROGENIC BLADDER: ICD-10-CM

## 2017-12-14 DIAGNOSIS — S32.030K CLOSED COMPRESSION FRACTURE OF L3 LUMBAR VERTEBRA WITH NONUNION, SUBSEQUENT ENCOUNTER: Primary | ICD-10-CM

## 2017-12-14 DIAGNOSIS — I10 ESSENTIAL HYPERTENSION: ICD-10-CM

## 2017-12-14 DIAGNOSIS — N30.00 ACUTE CYSTITIS WITHOUT HEMATURIA: ICD-10-CM

## 2017-12-14 DIAGNOSIS — N48.1 BALANITIS: ICD-10-CM

## 2017-12-14 PROCEDURE — 99310 SBSQ NF CARE HIGH MDM 45: CPT | Performed by: NURSE PRACTITIONER

## 2017-12-14 NOTE — PROGRESS NOTES
Ruperto Coleman.   : 1940  Age 64year old  male patient is admitted to [place] [address] for strengthening and rehab on 2017    Chief complaint: Fall/ weakness/L3 Compression FX s/p L3 vertebral augmentation on , UTI  Admitted to [address] [place] [address]: / Past Medical History:   Diagnosis Date   • Back problem     lower back spinal stenosis   • Enlarged prostate    • Essential hypertension    • Gout    • High blood pressure      No past surgical history on file.   Family History   Problem Relation Age of O tablet Rfl: 3   carbidopa-levodopa  MG Oral Tab Take 2 tablets by mouth 2 (two) times daily. Disp: 120 tablet Rfl: 5   finasteride 5 MG Oral Tab Take 1 tablet (5 mg total) by mouth daily.  Disp: 30 tablet Rfl: 11   AmLODIPine Besylate 5 MG Oral Tab Ta mucous membranes pink, moist, pharynx no exudate, no visible cerumen.   NECK: supple; FROM; no JVD, no TMG, no carotid bruits  RESPIRATORY:clear to percussion and auscultation  CARDIOVASCULAR: S1, S2 normal, RRR; no S3, no S4;   ABDOMEN:  normal active BS+,

## 2017-12-15 ENCOUNTER — SNF VISIT (OUTPATIENT)
Dept: INTERNAL MEDICINE CLINIC | Facility: SKILLED NURSING FACILITY | Age: 77
End: 2017-12-15

## 2017-12-15 VITALS
HEART RATE: 87 BPM | DIASTOLIC BLOOD PRESSURE: 86 MMHG | WEIGHT: 174 LBS | TEMPERATURE: 97 F | SYSTOLIC BLOOD PRESSURE: 158 MMHG | RESPIRATION RATE: 18 BRPM | BODY MASS INDEX: 24 KG/M2

## 2017-12-15 DIAGNOSIS — N30.00 ACUTE CYSTITIS WITHOUT HEMATURIA: ICD-10-CM

## 2017-12-15 DIAGNOSIS — I10 ESSENTIAL HYPERTENSION: ICD-10-CM

## 2017-12-15 DIAGNOSIS — S32.030K CLOSED COMPRESSION FRACTURE OF L3 LUMBAR VERTEBRA WITH NONUNION, SUBSEQUENT ENCOUNTER: Primary | ICD-10-CM

## 2017-12-15 DIAGNOSIS — G20 PARKINSON DISEASE (HCC): ICD-10-CM

## 2017-12-15 DIAGNOSIS — N31.9 NEUROGENIC BLADDER: ICD-10-CM

## 2017-12-15 PROCEDURE — 99308 SBSQ NF CARE LOW MDM 20: CPT | Performed by: NURSE PRACTITIONER

## 2017-12-15 NOTE — PROGRESS NOTES
Italia Vidal, 12/21/1940, 64year old, male at [place] [address]  for strengthening and rehab on 12/11/2017     Chief complaint: Fall/ weakness/L3 Compression FX s/p L3 vertebral augmentation on 12/5, UTI  Admitted to [address] [place] [address] Avenue: 12/4/2017-12/11/2017     HPI: 76 about his wife. Will attempt to reach out to Dr Eula Alfonso to inquire for patient.      Objective:  /86   Pulse 87   Temp (!) 97.4 °F (36.3 °C)   Resp 18   Wt 174 lb (78.9 kg)   BMI 24.27 kg/m²             REVIEW OF SYSTEMS:  GENERAL HEALTH:feels well othe deficit  PSYCHIATRIC: alert and oriented x 3; affect appropriate     CODE STATUS:  DNR: Has a valid DNR PLOST form in [facility] at [facility] TEAM: Requires family care conference. Lives at home with wife.      12/15/17  CBC: WBC 7.5

## 2017-12-18 PROCEDURE — 99308 SBSQ NF CARE LOW MDM 20: CPT | Performed by: INTERNAL MEDICINE

## 2017-12-20 PROCEDURE — 99308 SBSQ NF CARE LOW MDM 20: CPT | Performed by: INTERNAL MEDICINE

## 2017-12-22 ENCOUNTER — TELEPHONE (OUTPATIENT)
Dept: INTERNAL MEDICINE CLINIC | Facility: CLINIC | Age: 77
End: 2017-12-22

## 2017-12-22 NOTE — TELEPHONE ENCOUNTER
Geovany Buck, is the daughter to check with NH for protocol or are you checking on this? Is the pt being treated?

## 2017-12-22 NOTE — TELEPHONE ENCOUNTER
Patient daughter Eduar Portillo calling patient had stool test yesterday and was put into quarantine Eduar Portillo wants to know how long and if he would be home for [place].

## 2017-12-22 NOTE — TELEPHONE ENCOUNTER
Spoke with Eduar Portillo and notified her. She will follow up with NH for standard of care/discharge protocol. As well as BM status if he still has diarrhea and how many BM's per day he is having, how long he should be on abx prior to discharge.

## 2017-12-26 ENCOUNTER — SNF VISIT (OUTPATIENT)
Dept: INTERNAL MEDICINE CLINIC | Facility: SKILLED NURSING FACILITY | Age: 77
End: 2017-12-26

## 2017-12-26 ENCOUNTER — TELEPHONE (OUTPATIENT)
Dept: INTERNAL MEDICINE CLINIC | Facility: CLINIC | Age: 77
End: 2017-12-26

## 2017-12-26 VITALS
TEMPERATURE: 97 F | WEIGHT: 173.5 LBS | DIASTOLIC BLOOD PRESSURE: 65 MMHG | BODY MASS INDEX: 24 KG/M2 | OXYGEN SATURATION: 97 % | RESPIRATION RATE: 18 BRPM | SYSTOLIC BLOOD PRESSURE: 140 MMHG | HEART RATE: 98 BPM

## 2017-12-26 DIAGNOSIS — I10 ESSENTIAL HYPERTENSION: ICD-10-CM

## 2017-12-26 DIAGNOSIS — S32.030K CLOSED COMPRESSION FRACTURE OF L3 LUMBAR VERTEBRA WITH NONUNION, SUBSEQUENT ENCOUNTER: Primary | ICD-10-CM

## 2017-12-26 DIAGNOSIS — G20 PARKINSON DISEASE (HCC): ICD-10-CM

## 2017-12-26 DIAGNOSIS — E86.0 DEHYDRATION: ICD-10-CM

## 2017-12-26 DIAGNOSIS — A09 DIARRHEA OF INFECTIOUS ORIGIN: ICD-10-CM

## 2017-12-26 DIAGNOSIS — A04.72 CLOSTRIDIUM DIFFICILE DIARRHEA: ICD-10-CM

## 2017-12-26 DIAGNOSIS — N31.9 NEUROGENIC BLADDER: ICD-10-CM

## 2017-12-26 DIAGNOSIS — R10.9 ABDOMINAL CRAMPING: ICD-10-CM

## 2017-12-26 PROCEDURE — 99309 SBSQ NF CARE MODERATE MDM 30: CPT | Performed by: NURSE PRACTITIONER

## 2017-12-27 ENCOUNTER — SNF/IP PROF CHARGE ONLY (OUTPATIENT)
Dept: INTERNAL MEDICINE CLINIC | Facility: CLINIC | Age: 77
End: 2017-12-27
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
## 2017-12-27 DIAGNOSIS — N31.9 NEUROGENIC BLADDER: ICD-10-CM
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
## 2017-12-27 DIAGNOSIS — R26.2 DIFFICULTY WALKING: ICD-10-CM

## 2017-12-27 DIAGNOSIS — I10 ESSENTIAL HYPERTENSION: ICD-10-CM

## 2017-12-27 DIAGNOSIS — N40.1 BENIGN PROSTATIC HYPERPLASIA WITH NOCTURIA: ICD-10-CM

## 2017-12-27 DIAGNOSIS — N28.1 BILATERAL RENAL CYSTS: ICD-10-CM

## 2017-12-27 DIAGNOSIS — M62.81 NEUROLOGICAL MUSCLE WEAKNESS: ICD-10-CM

## 2017-12-27 DIAGNOSIS — S32.030K CLOSED COMPRESSION FRACTURE OF L3 LUMBAR VERTEBRA WITH NONUNION, SUBSEQUENT ENCOUNTER: ICD-10-CM

## 2017-12-27 DIAGNOSIS — R35.1 BENIGN PROSTATIC HYPERPLASIA WITH NOCTURIA: ICD-10-CM

## 2017-12-27 DIAGNOSIS — R26.89 BALANCE PROBLEM: ICD-10-CM

## 2017-12-27 DIAGNOSIS — R33.9 URINARY RETENTION: ICD-10-CM

## 2017-12-27 DIAGNOSIS — R60.9 EDEMA, UNSPECIFIED TYPE: ICD-10-CM

## 2017-12-27 DIAGNOSIS — G20 PARKINSON DISEASE (HCC): ICD-10-CM

## 2017-12-27 DIAGNOSIS — M80.08XA AGE-RELATED OSTEOPOROSIS WITH CURRENT PATHOL FRACTURE OF VERTEBRA, INITIAL ENCOUNTER (HCC): ICD-10-CM

## 2017-12-27 PROCEDURE — 99308 SBSQ NF CARE LOW MDM 20: CPT | Performed by: INTERNAL MEDICINE

## 2017-12-27 NOTE — PROGRESS NOTES
Yahaira Little., 12/21/1940, 76year old, male at [place] [address] for strengthening and rehab on 12/11/2017    Chief Complaint: Abdominal cramping/ C-Diff  Subjective:Stomach cramping     Objective:  /65   Pulse 98   Temp (!) 97.4 °F (36.3 °C)   Resp sutures intact no redness or drainage   EYES: PERRLA, EOMI, sclera anicteric, conjunctiva normal; there is no nystagmus, no drainage from eyes  HENT: normocephalic; normal nose, no nasal drainage, mucous membranes pink, moist, pharynx no exudate, no visibl conference   6. +C-Diff on 12/22/Diarrhea/Abd cramping/deyhyrdation   -Continue current vanco Q6 hours till 1/4/2018 (14 days of treatment)  -Continue Florastor.   -Start IV 0.9 NS at 42ml/hr x 1 L bag then re-assess  -Zofran 4 mg ODT PRN nausea  -Strict i

## 2017-12-28 ENCOUNTER — SNF/IP PROF CHARGE ONLY (OUTPATIENT)
Dept: INTERNAL MEDICINE CLINIC | Facility: CLINIC | Age: 77
End: 2017-12-28

## 2017-12-28 DIAGNOSIS — R33.9 URINARY RETENTION: ICD-10-CM

## 2017-12-28 DIAGNOSIS — G20 PARKINSON DISEASE (HCC): ICD-10-CM

## 2017-12-28 DIAGNOSIS — M80.08XA AGE-RELATED OSTEOPOROSIS WITH CURRENT PATHOL FRACTURE OF VERTEBRA, INITIAL ENCOUNTER (HCC): ICD-10-CM
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
## 2017-12-28 DIAGNOSIS — R35.1 BENIGN PROSTATIC HYPERPLASIA WITH NOCTURIA: ICD-10-CM
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
## 2017-12-28 DIAGNOSIS — R26.89 BALANCE PROBLEM: ICD-10-CM

## 2017-12-28 DIAGNOSIS — I10 ESSENTIAL HYPERTENSION: ICD-10-CM

## 2017-12-28 DIAGNOSIS — N40.1 BENIGN PROSTATIC HYPERPLASIA WITH NOCTURIA: ICD-10-CM

## 2017-12-28 DIAGNOSIS — R26.2 DIFFICULTY WALKING: ICD-10-CM

## 2017-12-29 ENCOUNTER — SNF VISIT (OUTPATIENT)
Dept: INTERNAL MEDICINE CLINIC | Facility: SKILLED NURSING FACILITY | Age: 77
End: 2017-12-29

## 2017-12-29 VITALS
RESPIRATION RATE: 20 BRPM | WEIGHT: 175.88 LBS | BODY MASS INDEX: 25 KG/M2 | OXYGEN SATURATION: 97 % | SYSTOLIC BLOOD PRESSURE: 140 MMHG | HEART RATE: 87 BPM | TEMPERATURE: 97 F | DIASTOLIC BLOOD PRESSURE: 73 MMHG

## 2017-12-29 DIAGNOSIS — S32.030K CLOSED COMPRESSION FRACTURE OF L3 LUMBAR VERTEBRA WITH NONUNION, SUBSEQUENT ENCOUNTER: ICD-10-CM

## 2017-12-29 DIAGNOSIS — R33.9 URINARY RETENTION: ICD-10-CM

## 2017-12-29 DIAGNOSIS — G20 PARKINSON DISEASE (HCC): Primary | ICD-10-CM

## 2017-12-29 PROCEDURE — 99309 SBSQ NF CARE MODERATE MDM 30: CPT | Performed by: NURSE PRACTITIONER

## 2017-12-29 NOTE — PROGRESS NOTES
Brie Bhandari.   : 1940  Age 76year old  male patient is admitted to [place] [address] for strengthening and rehab on 2017    Admitted to [facility] on: 17 to 17     Chief complaint:  Abdominal cramping/ C-Diff  Stomach cram 57.     ALLERGIES:    Codeine                 Other (See Comments)    Comment:Pt could not wake up.   Escitalopram                CODE STATUS:  DNR    ADVANCED CARE PLANNING TEAM: will need family care plan, pt's wife who cares for him is going through her Wt 175 lb 14.4 oz (79.8 kg)   SpO2 97%   BMI 24.53 kg/m²       REVIEW OF SYSTEMS:    GENERAL HEALTH:feels well otherwise: more tired   SKIN: denies any unusual skin lesions or rashes  WOUNDS: Lumbar back incision    EYES:no visual complaints or deficits  H 2+  NEUROLOGIC: intact; no sensorimotor deficit  PSYCHIATRIC: alert and oriented x 3; affect appropriate, very sleepy        CODE STATUS:  DNR: Has a valid DNR PLOST form in [facility] at [address] [place] [address] Dr and plan:  Completed  IV fluids 0.9 NS at 42 ml/hr

## 2018-01-02 ENCOUNTER — SNF VISIT (OUTPATIENT)
Dept: INTERNAL MEDICINE CLINIC | Facility: SKILLED NURSING FACILITY | Age: 78
End: 2018-01-02

## 2018-01-02 VITALS
SYSTOLIC BLOOD PRESSURE: 124 MMHG | WEIGHT: 175.88 LBS | OXYGEN SATURATION: 95 % | DIASTOLIC BLOOD PRESSURE: 80 MMHG | HEART RATE: 80 BPM | TEMPERATURE: 97 F | BODY MASS INDEX: 25 KG/M2 | RESPIRATION RATE: 20 BRPM

## 2018-01-02 DIAGNOSIS — A09: Primary | ICD-10-CM

## 2018-01-02 DIAGNOSIS — R33.9 URINARY RETENTION: ICD-10-CM

## 2018-01-02 DIAGNOSIS — R26.89 BALANCE PROBLEM: ICD-10-CM

## 2018-01-02 DIAGNOSIS — E86.0 DEHYDRATION: ICD-10-CM

## 2018-01-02 DIAGNOSIS — G20 PARKINSON DISEASE (HCC): ICD-10-CM

## 2018-01-02 PROCEDURE — 99308 SBSQ NF CARE LOW MDM 20: CPT | Performed by: NURSE PRACTITIONER

## 2018-01-02 NOTE — PROGRESS NOTES
Mayi Nixon.   : 1940  Age 76year old  male patient is admitted to [place] [address] for strengthening and rehab on 2017    Admitted to [facility] on: 17 to 17     Chief complaint:  Abdominal cramping/ C-Diff/Dehydration does report intermittent nausea,  Zofran prn. BMP 12/29/17: bmp- creat 1.23, BUN 23, Glucose 82, Ca 8.8, Na 139, K 4.0, Chl 106, CO2 25, eGFR 57. Will monitor weekly cbc and bmp. No new issues or concerns, improving.     ALLERGIES:    Codeine 1000 units Oral Tab Take 1,000 Units by mouth daily. Disp:  Rfl:    omega-3 fatty acids 1000 MG Oral Cap Take 1,000 mg by mouth daily.  Disp:  Rfl:        VITALS:  /80   Pulse 80   Temp (!) 97.4 °F (36.3 °C) (Tympanic)   Resp 20   Wt 175 lb 14.4 oz (7 nontender, no guarding, no rebound tenderness.  Denies diarrhea, had no stool,  Completed antibx for  c-diff, no n/v today  :no suprapubic distension  LYMPHATIC:no lymphedema  MUSCULOSKELETAL: no acute synovitis upper or lower extremity  EXTREMITIES/VASCU treatment   -eGFR today was 62        This is a 15 minute visit and greater than 50% of the time was spent counseling the patient and/or coordinating care.     TIMOTHY Herndon  01/02/18   10:54 AM

## 2018-01-04 ENCOUNTER — SNF VISIT (OUTPATIENT)
Dept: INTERNAL MEDICINE CLINIC | Facility: SKILLED NURSING FACILITY | Age: 78
End: 2018-01-04

## 2018-01-04 VITALS
TEMPERATURE: 97 F | WEIGHT: 175.88 LBS | HEART RATE: 78 BPM | RESPIRATION RATE: 20 BRPM | BODY MASS INDEX: 25 KG/M2 | DIASTOLIC BLOOD PRESSURE: 76 MMHG | OXYGEN SATURATION: 97 % | SYSTOLIC BLOOD PRESSURE: 122 MMHG

## 2018-01-04 DIAGNOSIS — A04.72 CLOSTRIDIUM DIFFICILE DIARRHEA: Primary | ICD-10-CM

## 2018-01-04 DIAGNOSIS — G20 PARKINSON DISEASE (HCC): ICD-10-CM

## 2018-01-04 DIAGNOSIS — R26.2 DIFFICULTY WALKING: ICD-10-CM

## 2018-01-04 DIAGNOSIS — R53.1 GENERALIZED WEAKNESS: ICD-10-CM

## 2018-01-04 DIAGNOSIS — N31.9 NEUROGENIC BLADDER: ICD-10-CM

## 2018-01-04 DIAGNOSIS — S32.030K CLOSED COMPRESSION FRACTURE OF L3 LUMBAR VERTEBRA WITH NONUNION, SUBSEQUENT ENCOUNTER: ICD-10-CM

## 2018-01-04 PROCEDURE — 99308 SBSQ NF CARE LOW MDM 20: CPT | Performed by: NURSE PRACTITIONER

## 2018-01-04 NOTE — PROGRESS NOTES
Italia Quinteros.   : 1940  Age 76year old  male patient is admitted to [place] [address] for strengthening and rehab on 2017    Admitted to [facility] on: 17 to 17     Chief complaint: Abdominal cramping/ C-Diff/Dehydration  S reports eating today 75%, needs to be reminded to Drink more fluids. Lungs CTA.  Pt denies  intermittent nausea,  Zofran prn.    cbc and bmp in am.     ALLERGIES:    Codeine                 Other (See Comments)    Comment:Pt could not wake up.   Cherelle Solorzano mouth daily. Disp:  Rfl:    omega-3 fatty acids 1000 MG Oral Cap Take 1,000 mg by mouth daily.  Disp:  Rfl:        VITALS:  /76   Pulse 78   Temp (!) 97.4 °F (36.3 °C) (Tympanic)   Resp 20   Wt 175 lb 14.4 oz (79.8 kg)   SpO2 97%   BMI 24.53 kg/m² tenderness.  Denies diarrhea, had one odorous  stool, Back on Vanco x 10 days  for  c-diff, no n/v today  :no suprapubic distension  LYMPHATIC:no lymphedema  MUSCULOSKELETAL: no acute synovitis upper or lower extremity  EXTREMITIES/VASCULAR:dorsalis pedal PRN nausea, denies nausea today  -Strict isolation  - [facility] on 12/29 reviewed, see hpi   7.Pt had echo as out pt with decreased EF per Dr Jamie Ewing  - consulted cards see him while here in rehab and decide if any specific treatment   -eGFR today was 57

## 2018-01-09 PROCEDURE — 99308 SBSQ NF CARE LOW MDM 20: CPT | Performed by: INTERNAL MEDICINE

## 2018-01-10 ENCOUNTER — TELEPHONE (OUTPATIENT)
Dept: SURGERY | Facility: CLINIC | Age: 78
End: 2018-01-10

## 2018-01-10 ENCOUNTER — SNF VISIT (OUTPATIENT)
Dept: INTERNAL MEDICINE CLINIC | Facility: SKILLED NURSING FACILITY | Age: 78
End: 2018-01-10

## 2018-01-10 VITALS
TEMPERATURE: 99 F | WEIGHT: 175.88 LBS | SYSTOLIC BLOOD PRESSURE: 142 MMHG | OXYGEN SATURATION: 98 % | RESPIRATION RATE: 20 BRPM | BODY MASS INDEX: 25 KG/M2 | DIASTOLIC BLOOD PRESSURE: 84 MMHG | HEART RATE: 85 BPM

## 2018-01-10 DIAGNOSIS — R26.89 BALANCE PROBLEM: ICD-10-CM

## 2018-01-10 DIAGNOSIS — G20 PARKINSON DISEASE (HCC): ICD-10-CM

## 2018-01-10 DIAGNOSIS — R33.9 URINARY RETENTION: ICD-10-CM

## 2018-01-10 DIAGNOSIS — N31.9 NEUROGENIC BLADDER: Primary | ICD-10-CM

## 2018-01-10 PROCEDURE — 99310 SBSQ NF CARE HIGH MDM 45: CPT | Performed by: NURSE PRACTITIONER

## 2018-01-10 NOTE — TELEPHONE ENCOUNTER
Spoke with pt's spouse Lorre Gowers and and detemined that there was a little blood in the upper part of the tubing where it connects to the catheter but today there is none and she states that pt is also c/o soreness at the tip of the penis.  I asked if pt has a

## 2018-01-10 NOTE — TELEPHONE ENCOUNTER
Pt's spouse states that pt is experiencing blood and soreness in catheter. Please advise. Thank you.

## 2018-01-10 NOTE — PROGRESS NOTES
Emanuel Osuna., 12/21/1940, 76year old, male is being discharged from [place] [address] 1/10/18      DISCHARGE SUMMARY    Date of Admission [facility] : 12/4/17 to 12/11/17     Date of Discharge [facility]: 12/11/17 to 1/10/18 home with wife and [facility] HH/PT/OT ( THEY HAD BEEN FOLLOWED BY THEM BEFORE). To see Urologist beginning of feb and f/u with PCP IN ONE WEEK. Wife given extra clark bags and training regarding clark care. No issues or concerns.  Stable for discharge, wi guarding, no rebound tenderness.  Denies diarrhea, had one odorous  stool, Back on Vanco x 10 days  for  c-diff, no n/v today  :no suprapubic distension  LYMPHATIC:no lymphedema  MUSCULOSKELETAL: no acute synovitis upper or lower extremity  EXTREMITIES/VA nausea, denies nausea today   7. Pt had echo as out pt with decreased EF per Dr Lauren Segundo  - was seen by  cards in rehab , no new orders or recommendations   -eGFR 57  8.  Discharge   -discharge today 1/10/18, home with wife  -Home with [facility] HH/PT/OT , H

## 2018-01-11 ENCOUNTER — PATIENT OUTREACH (OUTPATIENT)
Dept: CASE MANAGEMENT | Age: 78
End: 2018-01-11

## 2018-01-11 ENCOUNTER — TELEPHONE (OUTPATIENT)
Dept: INTERNAL MEDICINE UNIT | Facility: HOSPITAL | Age: 78
End: 2018-01-11

## 2018-01-12 NOTE — PROGRESS NOTES
Initial Post Discharge Follow Up   Discharge Date: 1/10/18-SNF  Contact Date: 1/11/2018    Consent Verification:  Assessment Completed With: Spouse: Tank Coe received per patient?  written  HIPAA Verified?   Yes    Discharge Dx:   S/p Abdominal cr were prescribed a new medication:   o Was the new medication’s purpose explained? yes  o Do you have any questions about your new medication?  No  • Did you  your discharge medications when you left the hospital? Yes  • May I go over your medications Exam - Established Patient with Jayson Valero MD [facility], [address], [address] [facility], [facility])    May 29, 2018  2:15 PM CDT Follow Up Visit with Ezio Segovia  [place]

## 2018-01-14 ENCOUNTER — SNF/IP PROF CHARGE ONLY (OUTPATIENT)
Dept: INTERNAL MEDICINE CLINIC | Facility: CLINIC | Age: 78
End: 2018-01-14

## 2018-01-14 DIAGNOSIS — R26.2 DIFFICULTY WALKING: ICD-10-CM

## 2018-01-14 DIAGNOSIS — N31.9 NEUROGENIC BLADDER: ICD-10-CM

## 2018-01-14 DIAGNOSIS — M62.81 NEUROLOGICAL MUSCLE WEAKNESS: ICD-10-CM

## 2018-01-14 DIAGNOSIS — S32.030K CLOSED COMPRESSION FRACTURE OF L3 LUMBAR VERTEBRA WITH NONUNION, SUBSEQUENT ENCOUNTER: ICD-10-CM

## 2018-01-14 DIAGNOSIS — A04.72 C. DIFFICILE DIARRHEA: ICD-10-CM

## 2018-01-14 DIAGNOSIS — G20 PARKINSON DISEASE (HCC): ICD-10-CM

## 2018-01-14 DIAGNOSIS — M80.08XA AGE-RELATED OSTEOPOROSIS WITH CURRENT PATHOL FRACTURE OF VERTEBRA, INITIAL ENCOUNTER (HCC): ICD-10-CM

## 2018-01-14 DIAGNOSIS — I10 ESSENTIAL HYPERTENSION: ICD-10-CM

## 2018-01-14 DIAGNOSIS — R33.9 URINARY RETENTION: ICD-10-CM

## 2018-01-17 ENCOUNTER — OFFICE VISIT (OUTPATIENT)
Dept: INTERNAL MEDICINE CLINIC | Facility: CLINIC | Age: 78
End: 2018-01-17

## 2018-01-17 ENCOUNTER — TELEPHONE (OUTPATIENT)
Dept: SURGERY | Facility: CLINIC | Age: 78
End: 2018-01-17

## 2018-01-17 VITALS
WEIGHT: 176 LBS | SYSTOLIC BLOOD PRESSURE: 136 MMHG | HEART RATE: 73 BPM | BODY MASS INDEX: 24.64 KG/M2 | DIASTOLIC BLOOD PRESSURE: 78 MMHG | HEIGHT: 71 IN

## 2018-01-17 DIAGNOSIS — R26.2 DIFFICULTY WALKING: ICD-10-CM

## 2018-01-17 DIAGNOSIS — G20 PARKINSON DISEASE (HCC): ICD-10-CM

## 2018-01-17 DIAGNOSIS — R33.9 URINARY RETENTION: ICD-10-CM

## 2018-01-17 DIAGNOSIS — Z09 HOSPITAL DISCHARGE FOLLOW-UP: Primary | ICD-10-CM

## 2018-01-17 DIAGNOSIS — I10 ESSENTIAL HYPERTENSION: ICD-10-CM

## 2018-01-17 DIAGNOSIS — N31.9 NEUROGENIC BLADDER: ICD-10-CM

## 2018-01-17 DIAGNOSIS — N48.89 IRRITATION OF PENIS: ICD-10-CM

## 2018-01-17 DIAGNOSIS — S32.030K CLOSED COMPRESSION FRACTURE OF L3 LUMBAR VERTEBRA WITH NONUNION, SUBSEQUENT ENCOUNTER: ICD-10-CM

## 2018-01-17 PROCEDURE — 99495 TRANSJ CARE MGMT MOD F2F 14D: CPT | Performed by: INTERNAL MEDICINE

## 2018-01-17 PROCEDURE — G0009 ADMIN PNEUMOCOCCAL VACCINE: HCPCS | Performed by: INTERNAL MEDICINE

## 2018-01-17 PROCEDURE — 90670 PCV13 VACCINE IM: CPT | Performed by: INTERNAL MEDICINE

## 2018-01-17 RX ORDER — ALENDRONATE SODIUM 35 MG/1
35 TABLET ORAL
Qty: 4 TABLET | Refills: 11 | Status: SHIPPED | OUTPATIENT
Start: 2018-01-17 | End: 2018-08-30

## 2018-01-17 NOTE — PROGRESS NOTES
HPI:    Marquis Montenegro is a 76year old male here today for hospital follow up.    He was discharged from Long-term care facilities, SNF to Home   Admission Date: 12/4/17   Discharge Date: 12/11/17  Hospital Discharge Diagnoses (since 12/18/2017)   None 6 hours. HYDROcodone-acetaminophen 5-325 MG Oral Tab Take 1 tablet by mouth every 6 (six) hours as needed. Bacitracin-Polymyxin B (POLYSPORIN) [id number] UNIT/GM External Ointment Apply 1 Application topically daily.    Miconazole Nitrate 2 % External P denies abdominal pain, denies heartburn, denies diarrhea  MUSCULOSKELETAL: denies pain, normal range of motion of extremities  NEURO: denies headaches, denies dizziness, denies weakness  PSYCHE: denies depression or anxiety  HEMATOLOGIC: denies hx of anemi pain topical med if not better let me know     Other orders  -     PNEUMOCOCCAL VACC, 13 RENATA IM          Orders Placed This Encounter      PNEUMOCOCCAL VACC, 13 RENATA IM    Meds & Refills for this Visit:  No prescriptions requested or ordered in this encount

## 2018-01-17 NOTE — TELEPHONE ENCOUNTER
Patients spouse states patient had cath changed on 01/08 by a RN in his nursing home  States the next day after the reinsertion of the catheter patient had blood in urine for a while. States patient has since been having pain and discomfort.  Is worried it

## 2018-01-17 NOTE — TELEPHONE ENCOUNTER
I spoke with pt's spouse who has a signed SAE on file and determined that pt was in the nsg home but home now and on 1/8 the nurse there changed the clark cath and was having difficulty getting it in and another nurse was called in to complete it.  Spouse s

## 2018-01-17 NOTE — PROGRESS NOTES
Left Detailed message (HIPPA verified) relaying Dr. Michelle Minor message below. Office number provided if any questions.         MD Jacinda Manningk Clinical Staff 2 minutes ago (2:40 PM)     Please let wife know I prescribed the Alendronate med for h

## 2018-01-18 NOTE — TELEPHONE ENCOUNTER
Spoke to patient's wife, Cally Cardona. Relayed Dr. Sanna Galvez' message/orders below to patient's wife. Patient's wife verbalized understanding.   Patient's wife states patient has not had anymore blood in clark catheter bag since 1/9 ( 9 days ago), wife states pa

## 2018-01-18 NOTE — TELEPHONE ENCOUNTER
Please call wife/patient back. I agree with nursing at that more likely than not, pain because not enough slack left in the process of attaching securing Azul to the leg.   Either explained to wife or else called the visiting nurse will be applying the [address]

## 2018-01-19 ENCOUNTER — MED REC SCAN ONLY (OUTPATIENT)
Dept: INTERNAL MEDICINE CLINIC | Facility: CLINIC | Age: 78
End: 2018-01-19

## 2018-01-24 ENCOUNTER — TELEPHONE (OUTPATIENT)
Dept: SURGERY | Facility: CLINIC | Age: 78
End: 2018-01-24

## 2018-01-24 ENCOUNTER — TELEPHONE (OUTPATIENT)
Dept: CARDIOLOGY CLINIC | Facility: CLINIC | Age: 78
End: 2018-01-24

## 2018-01-24 NOTE — TELEPHONE ENCOUNTER
pts wife Sage Patterson called, she doesn't feel confident with the home health RN changing her husbands catheter. She asked if her  could come in either 2/8 or 2/9 for a cath change by one of [facility] RN. Thank you.

## 2018-01-24 NOTE — TELEPHONE ENCOUNTER
Received records Dr. Rosanne Paul requested from previous hospitalization. Echo was recommended and done at outside facility which was scanned.  Showed low EF 35% notes from [place] [id number] say \"Dr. Arpita Marti called and will treat medically\" Called pt for update and he feels

## 2018-01-25 ENCOUNTER — TELEPHONE (OUTPATIENT)
Dept: SURGERY | Facility: CLINIC | Age: 78
End: 2018-01-25

## 2018-01-25 NOTE — TELEPHONE ENCOUNTER
Pt's spouse calling with questions regarding pt's catheter, having trouble with stat lock. Please call. Thank you.

## 2018-01-25 NOTE — TELEPHONE ENCOUNTER
Stat lock given 1. Pt  Is still asking  For a call  Back  Regarding home Health and changing Pt;s Cath.

## 2018-01-25 NOTE — TELEPHONE ENCOUNTER
I spoke with pt's spouse and determined that she was having difficulty placing a new STAT lock on the clark cath. I asked her why the [facility] nurse is not performing this at her visit and has not taught her yet how to do this.  She informed that we provided her

## 2018-02-01 ENCOUNTER — TELEPHONE (OUTPATIENT)
Dept: INTERNAL MEDICINE CLINIC | Facility: CLINIC | Age: 78
End: 2018-02-01

## 2018-02-01 DIAGNOSIS — R19.7 DIARRHEA OF PRESUMED INFECTIOUS ORIGIN: Primary | ICD-10-CM

## 2018-02-01 DIAGNOSIS — Z86.19 HISTORY OF CLOSTRIDIUM DIFFICILE COLITIS: ICD-10-CM

## 2018-02-01 NOTE — TELEPHONE ENCOUNTER
Patient wife concerned if UTI or Cdiff  Patient had loose BM in am,  Ate toast and tea and had diarrhea only 1 time today no blood, not black. No cramping or abdominal pain. No fever  Azul draining clear and light yellow, drinking fluids well.   Azul ju

## 2018-02-01 NOTE — TELEPHONE ENCOUNTER
patient has been feeling dizzy this morning blood pressure is 141/91 and 160/72 he has been doing physical therapy  this morning with home health it it be an urine infection .  Please advice

## 2018-02-02 NOTE — TELEPHONE ENCOUNTER
Spoke with wife had another episode of diarrhea. Patient should have test for C diff. Should increase fluids keep hydrated. Will notify visiting nurse. If patient develop abdominal pain, blood in stool, fever, increase dizziness should go to ER.   Wif

## 2018-02-08 ENCOUNTER — NURSE ONLY (OUTPATIENT)
Dept: SURGERY | Facility: CLINIC | Age: 78
End: 2018-02-08

## 2018-02-08 DIAGNOSIS — R33.9 URINARY RETENTION: Primary | ICD-10-CM

## 2018-02-08 PROCEDURE — 51702 INSERT TEMP BLADDER CATH: CPT | Performed by: UROLOGY

## 2018-02-08 NOTE — PROGRESS NOTES
I called the pt and his spouse into the CMG room and myself and his spouse assisted him to transfer from the W/C to the exam table and also to lower his bottom clothing to his ankles.  We then positioned him on the table in supine position and I proceeded t to see the pt because she has been very unhappy with the current one not wanting to change the catheter. I told her to let us know if that happens and if not then pt will have to have the catheter changed here at the office.  Pt and spouse verbalized unders

## 2018-02-16 ENCOUNTER — OFFICE VISIT (OUTPATIENT)
Dept: SURGERY | Facility: CLINIC | Age: 78
End: 2018-02-16

## 2018-02-16 ENCOUNTER — TELEPHONE (OUTPATIENT)
Dept: CARDIOLOGY CLINIC | Facility: CLINIC | Age: 78
End: 2018-02-16

## 2018-02-16 VITALS
HEIGHT: 68 IN | RESPIRATION RATE: 16 BRPM | WEIGHT: 180 LBS | BODY MASS INDEX: 27.28 KG/M2 | TEMPERATURE: 98 F | HEART RATE: 67 BPM | SYSTOLIC BLOOD PRESSURE: 126 MMHG | DIASTOLIC BLOOD PRESSURE: 80 MMHG

## 2018-02-16 DIAGNOSIS — N40.1 BENIGN PROSTATIC HYPERPLASIA WITH URINARY RETENTION: ICD-10-CM

## 2018-02-16 DIAGNOSIS — N31.9 NEUROGENIC BLADDER: ICD-10-CM

## 2018-02-16 DIAGNOSIS — R33.8 BENIGN PROSTATIC HYPERPLASIA WITH URINARY RETENTION: ICD-10-CM

## 2018-02-16 DIAGNOSIS — Z87.448 HISTORY OF HYDRONEPHROSIS: ICD-10-CM

## 2018-02-16 DIAGNOSIS — R33.9 URINARY RETENTION: ICD-10-CM

## 2018-02-16 DIAGNOSIS — N39.0 RECURRENT UTI: Primary | ICD-10-CM

## 2018-02-16 DIAGNOSIS — N48.1 BALANITIS: ICD-10-CM

## 2018-02-16 PROCEDURE — G0463 HOSPITAL OUTPT CLINIC VISIT: HCPCS | Performed by: UROLOGY

## 2018-02-16 PROCEDURE — 99214 OFFICE O/P EST MOD 30 MIN: CPT | Performed by: UROLOGY

## 2018-02-16 RX ORDER — AMLODIPINE BESYLATE 5 MG/1
5 TABLET ORAL DAILY
Qty: 30 TABLET | Refills: 2 | Status: SHIPPED | OUTPATIENT
Start: 2018-02-16 | End: 2018-07-27

## 2018-02-16 RX ORDER — CLOTRIMAZOLE AND BETAMETHASONE DIPROPIONATE 10; .64 MG/G; MG/G
1 CREAM TOPICAL 2 TIMES DAILY
Qty: 45 G | Refills: 1 | Status: SHIPPED | OUTPATIENT
Start: 2018-02-16 | End: 2018-04-18

## 2018-02-16 RX ORDER — VALSARTAN 160 MG/1
160 TABLET ORAL 2 TIMES DAILY
Qty: 60 TABLET | Refills: 2 | Status: SHIPPED | OUTPATIENT
Start: 2018-02-16 | End: 2018-07-05

## 2018-02-16 NOTE — TELEPHONE ENCOUNTER
Pts wife called for refills:       Current Outpatient Prescriptions:     •  AmLODIPine Besylate 5 MG Oral Tab, Take 1 tablet (5 mg total) by mouth daily. , Disp: 30 tablet, Rfl: 2    •  valsartan 160 MG Oral Tab, Take 160 mg by mouth 2 (two) times daily.   ,

## 2018-02-16 NOTE — PATIENT INSTRUCTIONS
1.  Try to keep Azul catheter anchored to innermost thigh, very close to the penis so that there is more slack in the tube when you walk    2.   Apply clotrimazole--betamethasone cream to the coronal sulcus (the bridge immediately behind the head of the pe

## 2018-02-16 NOTE — PROGRESS NOTES
HPI:    Patient ID: Cami Alcala. is a 76year old male. HPI     History provided by pt and wife Beula Parents.     Urinary retention  10/3/17 EM ER; Shauna Jay because a outpatient kidney ultrasound 9/27/17 ordered for investigation of worsening kidney functio hydronephrosis and urinary retention, Clark was placed morning on thousand cc was drained clark was left in place was started on Flomax patient doing okay some irritation at the tip of the penis where a Clark goes in the bladder is nondistended anymore, ul test  12/10/2017 neurogenic bladder Parkinson's disease helps exacerbate this; patient is unable to feel any sensation or need to avoid.  10/18/17 simple CMG at [address] [place] [address] showed that bladder had weak muscle contractions.   02/08/2018 office nurses changed zachary vancomycin 50 mg/ml Oral Solution Take 250 mg by mouth every 6 (six) hours. Takes 2.5 ml by mouth every 6 hours. Disp:  Rfl:    HYDROcodone-acetaminophen 5-325 MG Oral Tab Take 1 tablet by mouth every 6 (six) hours as needed.  Disp: 60 tablet Rfl: 0   Bac Comments: Mild erosion on the meatus of the urethra. Azul catheter readjusted to allow for more slack in the tubing; Erythema on the coronal sulcus.   10/09/2017 Prostate 1-2+ enlarged, no palpable nodules or indurations   Musculoskeletal: Normal range of residual urinary bladder volume of 1194 mL. ASSESSMENT/PLAN:   (N39.0) Recurrent UTI  (primary encounter diagnosis)  12/07/2017 UA (from clark) RBC = 10; WBC = 157; Urine for Culture = 10-50,000 multiple species present probable contamination.  P tubing; Erythema on the coronal sulcus. I prescribed clotrimazole betamethasone cream to be applied on the coronal sulcus BID and pt and wife understand and agree. I fully explained benefits, risks, and alternatives of treatment options listed above. and is accurate and complete.   Denia Wright MD, 2/16/2018, 5:18 PM

## 2018-02-23 ENCOUNTER — TELEPHONE (OUTPATIENT)
Dept: SURGERY | Facility: CLINIC | Age: 78
End: 2018-02-23

## 2018-02-23 ENCOUNTER — NURSE ONLY (OUTPATIENT)
Dept: SURGERY | Facility: CLINIC | Age: 78
End: 2018-02-23

## 2018-02-23 DIAGNOSIS — R33.9 URINARY RETENTION: Primary | ICD-10-CM

## 2018-02-23 PROCEDURE — 51700 IRRIGATION OF BLADDER: CPT | Performed by: UROLOGY

## 2018-02-23 NOTE — TELEPHONE ENCOUNTER
Spouse was hooking up cath bag - states balloon part broke , she doesn't think it will work now - pls advise

## 2018-02-23 NOTE — TELEPHONE ENCOUNTER
Patient's wife contacted. Patient's wife states that while she was changing patient's overnight bag to the leg bag this morning; the clark \"balloon popped\", states now there is just plastic there. States stat lock is in place.  Advised RN visit today to e

## 2018-02-23 NOTE — PROGRESS NOTES
I called the pt and spouse into the exam room. Pt was in a w/c and I assisted him to a standing position as he held onto the exam table and his wife held the gait belt I lowered his bottom clothing to his ankles so that I could see what the problem was.  I

## 2018-02-26 ENCOUNTER — TELEPHONE (OUTPATIENT)
Dept: INTERNAL MEDICINE CLINIC | Facility: CLINIC | Age: 78
End: 2018-02-26

## 2018-02-26 DIAGNOSIS — R29.6 FREQUENT FALLS: ICD-10-CM

## 2018-02-26 DIAGNOSIS — R26.2 DIFFICULTY IN WALKING: Primary | ICD-10-CM

## 2018-02-26 DIAGNOSIS — G20 PARKINSON DISEASE (HCC): ICD-10-CM

## 2018-02-26 DIAGNOSIS — R26.89 BALANCE PROBLEMS: ICD-10-CM

## 2018-02-26 NOTE — TELEPHONE ENCOUNTER
Patients wife Ankit Griggs called for an order for outpatient physical therapy for parkinsons, he completed the home physical therapy but she would like patient to continue. Ankit Griggs wishes to take him to the [facility] location.

## 2018-02-27 ENCOUNTER — OFFICE VISIT (OUTPATIENT)
Dept: NEUROLOGY | Facility: CLINIC | Age: 78
End: 2018-02-27

## 2018-02-27 VITALS
SYSTOLIC BLOOD PRESSURE: 120 MMHG | WEIGHT: 180 LBS | DIASTOLIC BLOOD PRESSURE: 72 MMHG | RESPIRATION RATE: 16 BRPM | HEART RATE: 72 BPM | HEIGHT: 68 IN | BODY MASS INDEX: 27.28 KG/M2

## 2018-02-27 DIAGNOSIS — G20 PARKINSON'S DISEASE (HCC): Primary | ICD-10-CM

## 2018-02-27 DIAGNOSIS — G12.29 PSEUDOBULBAR PALSY (HCC): ICD-10-CM

## 2018-02-27 PROCEDURE — 99213 OFFICE O/P EST LOW 20 MIN: CPT | Performed by: OTHER

## 2018-02-27 RX ORDER — ROPINIROLE 0.5 MG/1
0.5 TABLET, FILM COATED ORAL 2 TIMES DAILY
Qty: 60 TABLET | Refills: 5 | Status: SHIPPED | OUTPATIENT
Start: 2018-02-27 | End: 2018-03-29

## 2018-02-27 NOTE — PROGRESS NOTES
Mary Hayes. : 1940     HPI:   Patient presents with:  Parkinson's: LOV: 17. F/U on parkinson's. Patient states that he has been doing well. He states that he is taking sinemet  mg, 2 po BID and feels it has really helped.  He emeterio (160 mg total) by mouth 2 (two) times daily. Disp: 60 tablet Rfl: 2   AmLODIPine Besylate 5 MG Oral Tab Take 1 tablet (5 mg total) by mouth daily. Disp: 30 tablet Rfl: 2   Alendronate Sodium 35 MG Oral Tab Take 1 tablet (35 mg total) by mouth every 7 days. Smoker                                                                Packs/day: 0.00      Years: 0.00           Quit date: 8/28/1962    Smokeless tobacco: Never Used                        Alcohol use: No            Other Topics            Concern  Isabel initiating his gait. ASSESSMENT AND PLAN:     James Corbett. 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   He does have increased tone and bradykinesia, and he felt he

## 2018-03-06 ENCOUNTER — TELEPHONE (OUTPATIENT)
Dept: NEUROLOGY | Facility: CLINIC | Age: 78
End: 2018-03-06

## 2018-03-06 NOTE — TELEPHONE ENCOUNTER
Spoke to patient's wife. She states that since patient started requip 0.5 mg BID he has been having a hard time walking.  She states that he has been extremely dizzy and he is not able to walk even with walker and she noticed that he was doing better before

## 2018-03-06 NOTE — TELEPHONE ENCOUNTER
Cami Metzger MD  [facility] Nurse Just now (3:34 PM)     If he got worse on meds, please have him stop it. (     Spoke to patient's wife and notified her of below. She was understanding and thankful for the return call.

## 2018-03-08 ENCOUNTER — NURSE ONLY (OUTPATIENT)
Dept: SURGERY | Facility: CLINIC | Age: 78
End: 2018-03-08

## 2018-03-08 DIAGNOSIS — R33.9 URINARY RETENTION: Primary | ICD-10-CM

## 2018-03-08 PROCEDURE — 51701 INSERT BLADDER CATHETER: CPT | Performed by: UROLOGY

## 2018-03-08 NOTE — PROGRESS NOTES
Pt presents for nurse visit for monthly catheter change. He is in a w/c , wearing a gait belt and accompanied by wife. Pt was is alert and oriented. Pt properly identified by spelling of last name and   With the assistance of pt's wife, pt was slowly

## 2018-03-16 ENCOUNTER — OFFICE VISIT (OUTPATIENT)
Dept: PHYSICAL THERAPY | Facility: HOSPITAL | Age: 78
End: 2018-03-16
Attending: INTERNAL MEDICINE
Payer: MEDICARE

## 2018-03-16 DIAGNOSIS — G20 PARKINSON DISEASE (HCC): ICD-10-CM

## 2018-03-16 DIAGNOSIS — R26.2 DIFFICULTY IN WALKING: ICD-10-CM

## 2018-03-16 DIAGNOSIS — R26.89 BALANCE PROBLEMS: ICD-10-CM

## 2018-03-16 DIAGNOSIS — R29.6 FREQUENT FALLS: ICD-10-CM

## 2018-03-16 PROCEDURE — 97163 PT EVAL HIGH COMPLEX 45 MIN: CPT

## 2018-03-16 NOTE — PROGRESS NOTES
NEUROLOGICAL PHYSICAL THERAPY EVALUATION:   Referring Physician: Dr. Arpita Marti  Diagnosis: Parkinson Disease      Date of Onset: 12/2017 Date of Service: 3/16/2018     PATIENT SUMMARY   Christen Grijalva is a 76year old y/o male who presents to therapy today decreased accuracy  Toe Tap: slow speed    Tone: Rigidity throughout trunk during functional tasks, limited trunk extension and rotation    ROM: dorsiflexion to neutral,     Strength:  TBA        Mobility / Transfers Level of Assistance   Rolling indep   S memory according to wife. Rehab Potential:good    Current status G Code: InitialMobility: Walking and Moving AroundCM: 80-99% impaired, limited, or restricted  Goal status G Code:  Mobility: Walking and Moving AroundCL: 60-79% impaired, limited, or restric

## 2018-03-20 ENCOUNTER — OFFICE VISIT (OUTPATIENT)
Dept: PHYSICAL THERAPY | Facility: HOSPITAL | Age: 78
End: 2018-03-20
Attending: INTERNAL MEDICINE
Payer: MEDICARE

## 2018-03-20 PROCEDURE — 97112 NEUROMUSCULAR REEDUCATION: CPT

## 2018-03-20 NOTE — PROGRESS NOTES
Diagnosis: Parkinson's Disease  Visit (# authorized): 2     Referring MD(next visit): Dr. George Aguilera  Precautions: At risk for falls  Medication Changes since last visit?: No    Subjective: No complaints or changes to report since previous session.      Heena Oquendo

## 2018-03-23 ENCOUNTER — OFFICE VISIT (OUTPATIENT)
Dept: PHYSICAL THERAPY | Facility: HOSPITAL | Age: 78
End: 2018-03-23
Attending: INTERNAL MEDICINE
Payer: MEDICARE

## 2018-03-23 PROCEDURE — 97112 NEUROMUSCULAR REEDUCATION: CPT

## 2018-03-23 PROCEDURE — 97530 THERAPEUTIC ACTIVITIES: CPT

## 2018-03-23 NOTE — PROGRESS NOTES
Diagnosis: Parkinson's Disease  Visit (# authorized):  3   Referring MD(next visit): Dr. Allie Warren  Precautions: At risk for falls  Medication Changes since last visit?: No    Subjective: Reports being very fatigued after last session.  HEP going well at home

## 2018-03-27 ENCOUNTER — OFFICE VISIT (OUTPATIENT)
Dept: PHYSICAL THERAPY | Facility: HOSPITAL | Age: 78
End: 2018-03-27
Attending: INTERNAL MEDICINE
Payer: MEDICARE

## 2018-03-27 PROCEDURE — 97112 NEUROMUSCULAR REEDUCATION: CPT

## 2018-03-27 PROCEDURE — 97530 THERAPEUTIC ACTIVITIES: CPT

## 2018-03-27 NOTE — PROGRESS NOTES
Diagnosis: Parkinson's Disease  Visit (# authorized):  4/10   Referring MD(next visit): Dr. Gogo Asher  Precautions:    At risk for falls  Medication Changes since last visit?: No  Subjective: Reports having increased back pain over past few days, so limited HEP

## 2018-03-30 ENCOUNTER — OFFICE VISIT (OUTPATIENT)
Dept: PHYSICAL THERAPY | Facility: HOSPITAL | Age: 78
End: 2018-03-30
Attending: INTERNAL MEDICINE
Payer: MEDICARE

## 2018-03-30 PROCEDURE — 97112 NEUROMUSCULAR REEDUCATION: CPT

## 2018-03-30 PROCEDURE — 97530 THERAPEUTIC ACTIVITIES: CPT

## 2018-03-30 PROCEDURE — 97116 GAIT TRAINING THERAPY: CPT

## 2018-03-30 NOTE — PATIENT INSTRUCTIONS
[facility] Exercises  Do 1-2 times per day    1. Stand at the countertop, not holding on unless you need to:   A. Reach up with one hand and then there other, alternating. Look up at your hand and really stretch. Do 5 on each side.    B.  Kick one leg

## 2018-03-30 NOTE — PROGRESS NOTES
Date  3/30          Visit Number  5/10          NMR x          Ther EX           Ther Act x          Gait Training x          CRM            Manual                 Diagnosis: Parkinson's Disease  Referring MD(next visit): Dr. Ramone Eaton  Precautions:    At risk

## 2018-04-03 ENCOUNTER — OFFICE VISIT (OUTPATIENT)
Dept: PHYSICAL THERAPY | Facility: HOSPITAL | Age: 78
End: 2018-04-03
Attending: INTERNAL MEDICINE
Payer: MEDICARE

## 2018-04-03 PROCEDURE — 97116 GAIT TRAINING THERAPY: CPT

## 2018-04-03 PROCEDURE — 97530 THERAPEUTIC ACTIVITIES: CPT

## 2018-04-03 PROCEDURE — 97112 NEUROMUSCULAR REEDUCATION: CPT

## 2018-04-03 NOTE — PROGRESS NOTES
Date  3/30 4/3/18         Visit Number  5/10 6/10         NMR x x         Ther EX           Ther Act x x         Gait Training x          CRM            Manual             Diagnosis: Parkinson's Disease  Referring MD(next visit): Dr. Morrison Borne  Precautions:

## 2018-04-06 ENCOUNTER — OFFICE VISIT (OUTPATIENT)
Dept: PHYSICAL THERAPY | Facility: HOSPITAL | Age: 78
End: 2018-04-06
Attending: INTERNAL MEDICINE
Payer: MEDICARE

## 2018-04-06 PROCEDURE — 97112 NEUROMUSCULAR REEDUCATION: CPT

## 2018-04-06 PROCEDURE — 97530 THERAPEUTIC ACTIVITIES: CPT

## 2018-04-06 PROCEDURE — 97116 GAIT TRAINING THERAPY: CPT

## 2018-04-10 ENCOUNTER — OFFICE VISIT (OUTPATIENT)
Dept: PHYSICAL THERAPY | Facility: HOSPITAL | Age: 78
End: 2018-04-10
Attending: INTERNAL MEDICINE
Payer: MEDICARE

## 2018-04-10 PROCEDURE — 97112 NEUROMUSCULAR REEDUCATION: CPT

## 2018-04-10 PROCEDURE — 97530 THERAPEUTIC ACTIVITIES: CPT

## 2018-04-10 PROCEDURE — 97116 GAIT TRAINING THERAPY: CPT

## 2018-04-10 NOTE — PROGRESS NOTES
Date  3/30 4/3/18 4/6/18 4/10/18       Visit Number  5/10 6/10 7/10 8/10       NMR x x x x       Ther EX           Ther Act x x x x       Gait Training x  x x       CRM            Manual             Diagnosis: Parkinson's Disease  Referring MD(next visit):

## 2018-04-11 ENCOUNTER — NURSE ONLY (OUTPATIENT)
Dept: SURGERY | Facility: CLINIC | Age: 78
End: 2018-04-11

## 2018-04-11 DIAGNOSIS — R33.9 URINARY RETENTION: Primary | ICD-10-CM

## 2018-04-11 PROCEDURE — 51702 INSERT TEMP BLADDER CATH: CPT | Performed by: UROLOGY

## 2018-04-11 NOTE — PROGRESS NOTES
I called the pt and his spouse into the exam room    Progress Notes   I called the pt and spouse into the exam room and I assisted the pt from the W/C with his spouse's assistance to the exam table and also to lower his bottom clothing to his ankles and I

## 2018-04-13 ENCOUNTER — APPOINTMENT (OUTPATIENT)
Dept: PHYSICAL THERAPY | Facility: HOSPITAL | Age: 78
End: 2018-04-13
Attending: INTERNAL MEDICINE
Payer: MEDICARE

## 2018-04-18 ENCOUNTER — OFFICE VISIT (OUTPATIENT)
Dept: INTERNAL MEDICINE CLINIC | Facility: CLINIC | Age: 78
End: 2018-04-18

## 2018-04-18 VITALS
HEART RATE: 56 BPM | WEIGHT: 169.19 LBS | HEIGHT: 68 IN | DIASTOLIC BLOOD PRESSURE: 86 MMHG | SYSTOLIC BLOOD PRESSURE: 135 MMHG | BODY MASS INDEX: 25.64 KG/M2 | TEMPERATURE: 99 F

## 2018-04-18 DIAGNOSIS — N31.9 NEUROGENIC BLADDER: ICD-10-CM

## 2018-04-18 DIAGNOSIS — R26.89 BALANCE PROBLEM: ICD-10-CM

## 2018-04-18 DIAGNOSIS — M80.08XA AGE-RELATED OSTEOPOROSIS WITH CURRENT PATHOL FRACTURE OF VERTEBRA, INITIAL ENCOUNTER (HCC): ICD-10-CM

## 2018-04-18 DIAGNOSIS — G20 PARKINSON DISEASE (HCC): Primary | ICD-10-CM

## 2018-04-18 DIAGNOSIS — N40.1 BENIGN PROSTATIC HYPERPLASIA WITH NOCTURIA: ICD-10-CM

## 2018-04-18 DIAGNOSIS — R35.1 BENIGN PROSTATIC HYPERPLASIA WITH NOCTURIA: ICD-10-CM

## 2018-04-18 DIAGNOSIS — Z12.5 ENCOUNTER FOR SCREENING FOR MALIGNANT NEOPLASM OF PROSTATE: ICD-10-CM

## 2018-04-18 DIAGNOSIS — I10 ESSENTIAL HYPERTENSION: ICD-10-CM

## 2018-04-18 PROCEDURE — 36415 COLL VENOUS BLD VENIPUNCTURE: CPT | Performed by: INTERNAL MEDICINE

## 2018-04-18 PROCEDURE — 99214 OFFICE O/P EST MOD 30 MIN: CPT | Performed by: INTERNAL MEDICINE

## 2018-04-18 PROCEDURE — G0463 HOSPITAL OUTPT CLINIC VISIT: HCPCS | Performed by: INTERNAL MEDICINE

## 2018-04-18 NOTE — PATIENT INSTRUCTIONS
ASSESSMENT/PLAN:   Parkinson disease (hcc)  (primary encounter diagnosis) able taking medication follows with no  Neurogenic bladder Azul in place gets changed once a month  Balance problem physical therapy use walker  Essential hypertension continue with

## 2018-04-18 NOTE — PROGRESS NOTES
HPI:    Patient ID: Alli Blackwell. is a 76year old male.     HPI  Patient comes in for follow-up overall is doing good continues with therapy sees physical therapy to the hospital specializing Parkinson's disease is been doing okay otherwise no complain daily. Disp: 30 tablet Rfl: 3   finasteride 5 MG Oral Tab Take 1 tablet (5 mg total) by mouth daily. Disp: 30 tablet Rfl: 11   Cyanocobalamin (B-12) 500 MCG Oral Tab Take 1 tablet by mouth daily.  Disp:  Rfl:    Vitamin D3 1000 units Oral Tab Take 1,000 Uni motion. He exhibits no edema or tenderness. Neurological: He is alert and oriented to person, place, and time. He has normal reflexes. Skin: Skin is warm and dry. Psychiatric: He has a normal mood and affect.  His behavior is normal. Judgment and thou

## 2018-04-27 ENCOUNTER — OFFICE VISIT (OUTPATIENT)
Dept: PHYSICAL THERAPY | Facility: HOSPITAL | Age: 78
End: 2018-04-27
Attending: INTERNAL MEDICINE
Payer: MEDICARE

## 2018-04-27 PROCEDURE — 97116 GAIT TRAINING THERAPY: CPT

## 2018-04-27 PROCEDURE — 97112 NEUROMUSCULAR REEDUCATION: CPT

## 2018-04-27 PROCEDURE — 97530 THERAPEUTIC ACTIVITIES: CPT

## 2018-04-27 NOTE — PROGRESS NOTES
Date  3/30 4/3/18 4/6/18 4/10/18 4/27/18      Visit Number  5/10 6/10 7/10 8/10 9/10      NMR x x x x x      Ther EX           Ther Act x x x x x      Gait Training x  x x x      CRM            Manual             Diagnosis: Parkinson's Disease  Referring M

## 2018-05-02 ENCOUNTER — OFFICE VISIT (OUTPATIENT)
Dept: PHYSICAL THERAPY | Facility: HOSPITAL | Age: 78
End: 2018-05-02
Attending: INTERNAL MEDICINE
Payer: MEDICARE

## 2018-05-02 PROCEDURE — 97530 THERAPEUTIC ACTIVITIES: CPT

## 2018-05-02 PROCEDURE — 97112 NEUROMUSCULAR REEDUCATION: CPT

## 2018-05-02 NOTE — PROGRESS NOTES
Date  3/30 4/3/18 4/6/18 4/10/18 4/27/18 5/2/18     Visit Number  5/10 6/10 7/10 8/10 9/10 10/10     NMR x x x x x x     Ther EX           Ther Act x x x x x x     Gait Training x  x x x x     CRM            Manual             Patient Name: Maeve Díaz with eyes closed   ___3___7. Standing with feet together   ___2___8. Reaching forward with outstretched arm   ___3___9. Retrieving object from floor   ___3__10. Turning to look behind   ___1__11.  Turning 360 degrees (more difficult turning to the R compare

## 2018-05-09 ENCOUNTER — NURSE ONLY (OUTPATIENT)
Dept: SURGERY | Facility: CLINIC | Age: 78
End: 2018-05-09

## 2018-05-09 ENCOUNTER — TELEPHONE (OUTPATIENT)
Dept: INTERNAL MEDICINE CLINIC | Facility: CLINIC | Age: 78
End: 2018-05-09

## 2018-05-09 DIAGNOSIS — G20 PARKINSON DISEASE (HCC): Primary | ICD-10-CM

## 2018-05-09 DIAGNOSIS — R33.9 URINARY RETENTION: Primary | ICD-10-CM

## 2018-05-09 DIAGNOSIS — R29.6 FREQUENT FALLS: ICD-10-CM

## 2018-05-09 DIAGNOSIS — M62.81 NEUROLOGICAL MUSCLE WEAKNESS: ICD-10-CM

## 2018-05-09 DIAGNOSIS — R26.9 ABNORMAL GAIT: ICD-10-CM

## 2018-05-09 PROCEDURE — 51702 INSERT TEMP BLADDER CATH: CPT | Performed by: UROLOGY

## 2018-05-09 NOTE — TELEPHONE ENCOUNTER
Patient spouse calling needs more orders to see PT Anthony Mcgovern patient goes 2 x per week for his parkinsons.  Please fax order [phone]

## 2018-05-09 NOTE — PROGRESS NOTES
I called the pt and spouse into the exam room and I assisted the pt from the W/C with his spouse's assistance to the exam table and also to lower his bottom clothing to his ankles and I then positioned him in supine position on the table.  I then deflated t

## 2018-05-15 ENCOUNTER — OFFICE VISIT (OUTPATIENT)
Dept: PHYSICAL THERAPY | Facility: HOSPITAL | Age: 78
End: 2018-05-15
Attending: INTERNAL MEDICINE
Payer: MEDICARE

## 2018-05-15 PROCEDURE — 97112 NEUROMUSCULAR REEDUCATION: CPT

## 2018-05-15 PROCEDURE — 97116 GAIT TRAINING THERAPY: CPT

## 2018-05-15 PROCEDURE — 97530 THERAPEUTIC ACTIVITIES: CPT

## 2018-05-15 NOTE — PROGRESS NOTES
Date  3/30 4/3/18 4/6/18 4/10/18 4/27/18 5/2/18 5/15/18    Visit Number  5/10 6/10 7/10 8/10 9/10 10/10 11/20    NMR x x x x x x x    Ther EX           Ther Act x x x x x x x    Gait Training x  x x x x x    CRM            Manual             Diagnosis: Par

## 2018-05-17 ENCOUNTER — OFFICE VISIT (OUTPATIENT)
Dept: PHYSICAL THERAPY | Facility: HOSPITAL | Age: 78
End: 2018-05-17
Attending: INTERNAL MEDICINE
Payer: MEDICARE

## 2018-05-17 PROCEDURE — 97112 NEUROMUSCULAR REEDUCATION: CPT

## 2018-05-17 PROCEDURE — 97116 GAIT TRAINING THERAPY: CPT

## 2018-05-17 PROCEDURE — 97530 THERAPEUTIC ACTIVITIES: CPT

## 2018-05-25 ENCOUNTER — APPOINTMENT (OUTPATIENT)
Dept: PHYSICAL THERAPY | Facility: HOSPITAL | Age: 78
End: 2018-05-25
Attending: INTERNAL MEDICINE
Payer: MEDICARE

## 2018-05-29 ENCOUNTER — OFFICE VISIT (OUTPATIENT)
Dept: CARDIOLOGY CLINIC | Facility: CLINIC | Age: 78
End: 2018-05-29

## 2018-05-29 VITALS
SYSTOLIC BLOOD PRESSURE: 124 MMHG | RESPIRATION RATE: 18 BRPM | HEART RATE: 68 BPM | DIASTOLIC BLOOD PRESSURE: 66 MMHG | BODY MASS INDEX: 26.83 KG/M2 | HEIGHT: 68 IN | WEIGHT: 177 LBS

## 2018-05-29 DIAGNOSIS — R60.9 EDEMA, UNSPECIFIED TYPE: Primary | ICD-10-CM

## 2018-05-29 DIAGNOSIS — I10 ESSENTIAL HYPERTENSION: ICD-10-CM

## 2018-05-29 DIAGNOSIS — R93.89 ABNORMAL FINDINGS ON DIAGNOSTIC IMAGING OF CARDIOVASCULAR SYSTEM: ICD-10-CM

## 2018-05-29 PROCEDURE — 99214 OFFICE O/P EST MOD 30 MIN: CPT | Performed by: INTERNAL MEDICINE

## 2018-05-29 PROCEDURE — G0463 HOSPITAL OUTPT CLINIC VISIT: HCPCS | Performed by: INTERNAL MEDICINE

## 2018-05-29 NOTE — PATIENT INSTRUCTIONS
Call if new leg swelling or shortness of breath  Call if interested in repeat echocardiogram or stress test

## 2018-05-29 NOTE — PROGRESS NOTES
Gilberto Lan is a 76year old male. Patient presents with:  Hypertension: Follow up  Edema    HPI:   This is a pleasant 68-year-old with hypertension Parkinson's disease and leg swelling he gets around with a walker is presently doing fairly well.   In Quit date: 8/28/1962  Smokeless tobacco: Never Used                      Alcohol use:  No                 REVIEW OF SYSTEMS:   GENERAL HEALTH: feels well otherwise  SKIN: denies any unusual skin lesions or rashes  RESPIRATORY: denies shortness of br patient indicates understanding of these issues and agrees to the plan. The patient is asked to return in 6 months.              Ember Velarde MD  5/29/2018  2:32 PM

## 2018-05-30 ENCOUNTER — OFFICE VISIT (OUTPATIENT)
Dept: PHYSICAL THERAPY | Facility: HOSPITAL | Age: 78
End: 2018-05-30
Attending: INTERNAL MEDICINE
Payer: MEDICARE

## 2018-05-30 DIAGNOSIS — G20 PARKINSON DISEASE (HCC): ICD-10-CM

## 2018-05-30 DIAGNOSIS — R29.6 FREQUENT FALLS: ICD-10-CM

## 2018-05-30 DIAGNOSIS — M62.81 NEUROLOGICAL MUSCLE WEAKNESS: ICD-10-CM

## 2018-05-30 DIAGNOSIS — R26.9 ABNORMAL GAIT: ICD-10-CM

## 2018-05-30 PROCEDURE — 97112 NEUROMUSCULAR REEDUCATION: CPT

## 2018-05-30 PROCEDURE — 97116 GAIT TRAINING THERAPY: CPT

## 2018-05-30 PROCEDURE — 97530 THERAPEUTIC ACTIVITIES: CPT

## 2018-05-30 NOTE — PROGRESS NOTES
Date  3/30 4/3/18 4/6/18 4/10/18 4/27/18 5/2/18 5/15/18 5/17/18 5/30/18   Visit Number  5/10 6/10 7/10 8/10 9/10 10/10 11/20 12/20 13/20   NMR x x x x x x x x x   Ther EX            Ther Act x x x x x x x x x   Gait Training x  x x x x x x x   CRM

## 2018-06-05 ENCOUNTER — OFFICE VISIT (OUTPATIENT)
Dept: PHYSICAL THERAPY | Facility: HOSPITAL | Age: 78
End: 2018-06-05
Attending: INTERNAL MEDICINE
Payer: MEDICARE

## 2018-06-05 DIAGNOSIS — G20 PARKINSON DISEASE (HCC): ICD-10-CM

## 2018-06-05 DIAGNOSIS — R29.6 FREQUENT FALLS: ICD-10-CM

## 2018-06-05 DIAGNOSIS — M62.81 NEUROLOGICAL MUSCLE WEAKNESS: ICD-10-CM

## 2018-06-05 DIAGNOSIS — R26.9 ABNORMAL GAIT: ICD-10-CM

## 2018-06-05 PROCEDURE — 97116 GAIT TRAINING THERAPY: CPT

## 2018-06-05 PROCEDURE — 97112 NEUROMUSCULAR REEDUCATION: CPT

## 2018-06-05 PROCEDURE — 97530 THERAPEUTIC ACTIVITIES: CPT

## 2018-06-05 NOTE — PROGRESS NOTES
Date  3/30 4/3/18 4/6/18 4/10/18 4/27/18 5/2/18 5/15/18 5/17/18 5/30/18 6/5/18     Visit Number  5/10 6/10 7/10 8/10 9/10 10/10 11/20 12/20 13/20 14/20     NMR x x x x x x x x x x     Ther EX               Ther Act x x x x x x x x x x     Gait Training x

## 2018-06-07 ENCOUNTER — TELEPHONE (OUTPATIENT)
Dept: SURGERY | Facility: CLINIC | Age: 78
End: 2018-06-07

## 2018-06-07 ENCOUNTER — NURSE ONLY (OUTPATIENT)
Dept: SURGERY | Facility: CLINIC | Age: 78
End: 2018-06-07

## 2018-06-07 DIAGNOSIS — Z97.8 FOLEY CATHETER STATUS: Primary | ICD-10-CM

## 2018-06-07 PROCEDURE — 99211 OFF/OP EST MAY X REQ PHY/QHP: CPT

## 2018-06-07 PROCEDURE — 51702 INSERT TEMP BLADDER CATH: CPT | Performed by: UROLOGY

## 2018-06-07 PROCEDURE — G0463 HOSPITAL OUTPT CLINIC VISIT: HCPCS

## 2018-06-07 NOTE — TELEPHONE ENCOUNTER
, please see nurse visit note. I have pended the order for you, if you agree. Please review and sign. thank you.

## 2018-06-07 NOTE — TELEPHONE ENCOUNTER
, per our verbal conversation, I sent new script to reflect that pt should apply a small amt of gel to meatus. Phoned pt's pharmacy and spoke with pharmacist, Juani Hannon. She is aware previous script is to be discontinued. She is thankful.

## 2018-06-07 NOTE — PROGRESS NOTES
Pt presents for nurse visit for monthly catheter change. He is in a w/c ,  and accompanied by wife. Pt is alert and oriented. Pt properly identified by spelling of last name and   With the assistance of pt's wife, pt was slowly and safely transferred on urethral meatus. Pharmacy verified as in epic. Thank you.

## 2018-06-08 ENCOUNTER — OFFICE VISIT (OUTPATIENT)
Dept: PHYSICAL THERAPY | Facility: HOSPITAL | Age: 78
End: 2018-06-08
Attending: INTERNAL MEDICINE
Payer: MEDICARE

## 2018-06-08 DIAGNOSIS — M62.81 NEUROLOGICAL MUSCLE WEAKNESS: ICD-10-CM

## 2018-06-08 DIAGNOSIS — R29.6 FREQUENT FALLS: ICD-10-CM

## 2018-06-08 DIAGNOSIS — G20 PARKINSON DISEASE (HCC): ICD-10-CM

## 2018-06-08 DIAGNOSIS — R26.9 ABNORMAL GAIT: ICD-10-CM

## 2018-06-08 PROCEDURE — 97112 NEUROMUSCULAR REEDUCATION: CPT

## 2018-06-08 PROCEDURE — 97530 THERAPEUTIC ACTIVITIES: CPT

## 2018-06-08 PROCEDURE — 97116 GAIT TRAINING THERAPY: CPT

## 2018-06-08 NOTE — PROGRESS NOTES
Date  3/30 4/3/18 4/6/18 4/10/18 4/27/18 5/2/18 5/15/18 5/17/18 5/30/18 6/5/18 6/8/18    Visit Number  5/10 6/10 7/10 8/10 9/10 10/10 11/20 12/20 13/20 14/20 15/20    NMR x x x x x x x x x x x    Ther EX               Ther Act x x x x x x x x x x x    Gait rm.     Charges: 1 NMR, 1 TA, 1 gait       Total Timed Treatment: 45 min  Total Treatment Time: 45 min

## 2018-06-12 ENCOUNTER — OFFICE VISIT (OUTPATIENT)
Dept: PHYSICAL THERAPY | Facility: HOSPITAL | Age: 78
End: 2018-06-12
Attending: INTERNAL MEDICINE
Payer: MEDICARE

## 2018-06-12 DIAGNOSIS — R29.6 FREQUENT FALLS: ICD-10-CM

## 2018-06-12 DIAGNOSIS — G20 PARKINSON DISEASE (HCC): ICD-10-CM

## 2018-06-12 DIAGNOSIS — R26.9 ABNORMAL GAIT: ICD-10-CM

## 2018-06-12 DIAGNOSIS — M62.81 NEUROLOGICAL MUSCLE WEAKNESS: ICD-10-CM

## 2018-06-12 PROCEDURE — 97530 THERAPEUTIC ACTIVITIES: CPT

## 2018-06-12 PROCEDURE — 97116 GAIT TRAINING THERAPY: CPT

## 2018-06-12 PROCEDURE — 97112 NEUROMUSCULAR REEDUCATION: CPT

## 2018-06-12 NOTE — PROGRESS NOTES
Date  3/30 4/3/18 4/6/18 4/10/18 4/27/18 5/2/18 5/15/18 5/17/18 5/30/18 6/5/18 6/8/18 6/12/18   Visit Number  5/10 6/10 7/10 8/10 9/10 10/10 11/20 12/20 13/20 14/20 15/20 16/20   NMR x x x x x x x x x x x x   Ther EX               Ther Act x x x x x x x x Plan: Continue with large amplitude movement, reactive and anticipitory control with rebounder.     Charges: 1 NMR, 1 TA, 1 gait       Total Timed Treatment: 45 min  Total Treatment Time: 45 min

## 2018-06-15 ENCOUNTER — OFFICE VISIT (OUTPATIENT)
Dept: PHYSICAL THERAPY | Facility: HOSPITAL | Age: 78
End: 2018-06-15
Attending: INTERNAL MEDICINE
Payer: MEDICARE

## 2018-06-15 DIAGNOSIS — R29.6 FREQUENT FALLS: ICD-10-CM

## 2018-06-15 DIAGNOSIS — M62.81 NEUROLOGICAL MUSCLE WEAKNESS: ICD-10-CM

## 2018-06-15 DIAGNOSIS — G20 PARKINSON DISEASE (HCC): ICD-10-CM

## 2018-06-15 DIAGNOSIS — R26.9 ABNORMAL GAIT: ICD-10-CM

## 2018-06-15 PROCEDURE — 97116 GAIT TRAINING THERAPY: CPT

## 2018-06-15 PROCEDURE — 97530 THERAPEUTIC ACTIVITIES: CPT

## 2018-06-15 PROCEDURE — 97112 NEUROMUSCULAR REEDUCATION: CPT

## 2018-06-15 NOTE — PROGRESS NOTES
Date  5/15/18 5/17/18 5/30/18 6/5/18 6/8/18 6/12/18 6/15/18   Visit Number  11/20 12/20 13/20 14/20 15/20 16/20 17/20   NMR x x x x x x x   Ther EX          Ther Act x x x x x x x   Gait Training x x x x x x x   CRM           Manual            Diagnosis: P

## 2018-06-19 ENCOUNTER — OFFICE VISIT (OUTPATIENT)
Dept: PHYSICAL THERAPY | Facility: HOSPITAL | Age: 78
End: 2018-06-19
Attending: INTERNAL MEDICINE
Payer: MEDICARE

## 2018-06-19 DIAGNOSIS — R26.9 ABNORMAL GAIT: ICD-10-CM

## 2018-06-19 DIAGNOSIS — R29.6 FREQUENT FALLS: ICD-10-CM

## 2018-06-19 DIAGNOSIS — G20 PARKINSON DISEASE (HCC): ICD-10-CM

## 2018-06-19 DIAGNOSIS — M62.81 NEUROLOGICAL MUSCLE WEAKNESS: ICD-10-CM

## 2018-06-19 PROCEDURE — 97530 THERAPEUTIC ACTIVITIES: CPT

## 2018-06-19 PROCEDURE — 97112 NEUROMUSCULAR REEDUCATION: CPT

## 2018-06-19 PROCEDURE — 97116 GAIT TRAINING THERAPY: CPT

## 2018-06-20 NOTE — PROGRESS NOTES
Date  5/15/18 5/17/18 5/30/18 6/5/18 6/8/18 6/12/18 6/15/18 6/19/18     Visit Number  11/20 12/20 13/20 14/20 15/20 16/20 17/20 18/20     NMR x x x x x x x x     Ther EX             Ther Act x x x x x x x x     Gait Training x x x x x x x x     CRM

## 2018-06-29 ENCOUNTER — OFFICE VISIT (OUTPATIENT)
Dept: PHYSICAL THERAPY | Facility: HOSPITAL | Age: 78
End: 2018-06-29
Attending: INTERNAL MEDICINE
Payer: MEDICARE

## 2018-06-29 PROCEDURE — 97112 NEUROMUSCULAR REEDUCATION: CPT

## 2018-06-29 PROCEDURE — 97530 THERAPEUTIC ACTIVITIES: CPT

## 2018-06-29 NOTE — PROGRESS NOTES
Patient Name: Vignesh Monterroso, : 1940, MRN: [id number]   Date: 2018  Referring Physician:  Joana Bethea  Diagnosis:  Parkinsons Disease    Discharge Summary  Pt has attended 20 visits in physical therapy.   Progress Note Start Date:  ___2___8. Reaching forward with outstretched arm   ___3___9. Retrieving object from floor   ___3__10. Turning to look behind   ___1__11. Turning 360 degrees (more difficult turning to the R compared to left)  ___0__12.  Placing alternate foot on stool   _

## 2018-07-05 ENCOUNTER — OFFICE VISIT (OUTPATIENT)
Dept: SURGERY | Facility: CLINIC | Age: 78
End: 2018-07-05

## 2018-07-05 DIAGNOSIS — N40.1 BENIGN PROSTATIC HYPERPLASIA WITH URINARY RETENTION: ICD-10-CM

## 2018-07-05 DIAGNOSIS — R33.9 URINARY RETENTION: ICD-10-CM

## 2018-07-05 DIAGNOSIS — R31.0 GROSS HEMATURIA: Primary | ICD-10-CM

## 2018-07-05 DIAGNOSIS — N36.8 URETHRAL BLEEDING: ICD-10-CM

## 2018-07-05 DIAGNOSIS — Z87.448 HISTORY OF HYDRONEPHROSIS: ICD-10-CM

## 2018-07-05 DIAGNOSIS — N31.9 NEUROGENIC BLADDER: ICD-10-CM

## 2018-07-05 DIAGNOSIS — R33.8 BENIGN PROSTATIC HYPERPLASIA WITH URINARY RETENTION: ICD-10-CM

## 2018-07-05 DIAGNOSIS — N39.0 RECURRENT UTI: ICD-10-CM

## 2018-07-05 PROCEDURE — 96372 THER/PROPH/DIAG INJ SC/IM: CPT | Performed by: UROLOGY

## 2018-07-05 PROCEDURE — 99214 OFFICE O/P EST MOD 30 MIN: CPT | Performed by: UROLOGY

## 2018-07-05 PROCEDURE — 51703 INSERT BLADDER CATH COMPLEX: CPT | Performed by: UROLOGY

## 2018-07-05 RX ORDER — CEFTRIAXONE 1 G/1
1000 INJECTION, POWDER, FOR SOLUTION INTRAMUSCULAR; INTRAVENOUS ONCE
Status: COMPLETED | OUTPATIENT
Start: 2018-07-05 | End: 2018-07-05

## 2018-07-05 RX ADMIN — CEFTRIAXONE 1000 MG: 1 INJECTION, POWDER, FOR SOLUTION INTRAMUSCULAR; INTRAVENOUS at 15:15:00

## 2018-07-05 NOTE — PROGRESS NOTES
This was originally a nurse visit. Correction to 's note, as discussed with  when I asked him to evaluate the pt, the bleeding started immediately after I had slowly  And with great caution,injected lidocaine, urojet , 10cc into urethra.  I leeroy

## 2018-07-05 NOTE — PATIENT INSTRUCTIONS
1.   Urine culture from Azul catheter insertion; if you remain without a fever and without any significant symptoms, we would observe the finding of any bacteria in your urine; if you develop a fever spike during the next few days, we will use this urine c

## 2018-07-05 NOTE — TELEPHONE ENCOUNTER
Hypertensive Medications  Protocol Criteria:  · Appointment scheduled with Cardiology in the past 12 months or in the next 3 months  · BMP or CMP in the past 12 months  · Potassium: 3.1 - 4.9 mmol/L  · Creatinine is normal or increase is less than 30% from with Cardiologist in the past 12 months. Please review and advise.

## 2018-07-05 NOTE — PROGRESS NOTES
HPI:    Patient ID: Emanuel Osuna. is a 76year old male. HPI       History provided by pt. Gross hematuria   Onset: just started right now; Just started bleeding immediately after nurse removed catheter.   Associated Sx: Denies any associated fev the abnormal renal ultrasound 9/27/17 noting moderate right hydroureteronephrosis was noted; cystogram x-ray ordered; finasteride 5 mg started and tamsulosin continued.  10/18/2017 simple CMG test at [facility]; bladder sensory, tone, motor function intact; sherrie History:   Diagnosis Date   • Back problem     lower back spinal stenosis   • Enlarged prostate    • Essential hypertension    • Gout    • High blood pressure       No past surgical history on file.    Family History   Problem Relation Age of Onset   • Canc well-nourished. No distress. HENT:   Head: Normocephalic and atraumatic. Eyes: EOM are normal.   Neck: Normal range of motion. Pulmonary/Chest: No respiratory distress.    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  10/18/17 simple CMG at [address] [place] [address] showed that bladder had weak muscle contractions. Discussed treatment and pt and wife decide to keep Azul in place to be changed out monthly.  I advised p until now.   Please attached leg bag and such a fashion that there is slack in the tubing so it is not causing friction or trauma to the end of the urethra        Orders Placed This Encounter      Urine Culture, Routine    Meds This Visit:  No prescriptions

## 2018-07-09 RX ORDER — VALSARTAN 160 MG/1
TABLET ORAL
Qty: 60 TABLET | Refills: 1 | Status: SHIPPED | OUTPATIENT
Start: 2018-07-09 | End: 2018-08-10 | Stop reason: ALTCHOICE

## 2018-07-09 NOTE — TELEPHONE ENCOUNTER
Pts wife called again about valsartan RX. Pt does not have any medication left for his evening dose. Please call.

## 2018-07-10 NOTE — TELEPHONE ENCOUNTER
LOV 5/29/18 MB note reviewed no change in medication  RE: Valsartan    Patient was seen. Script sent by [facility] 7/9    Call placed to Yessy Perdomo [facility]). She did pick medication up.

## 2018-07-27 RX ORDER — AMLODIPINE BESYLATE 5 MG/1
TABLET ORAL
Qty: 30 TABLET | Refills: 1 | Status: SHIPPED | OUTPATIENT
Start: 2018-07-27 | End: 2018-08-22

## 2018-07-27 NOTE — TELEPHONE ENCOUNTER
He is completely out   Current Outpatient Prescriptions:   •  •  AmLODIPine Besylate 5 MG Oral Tab, Take 1 tablet (5 mg total) by mouth daily. , Disp: 30 tablet, Rfl: 2  •

## 2018-08-08 ENCOUNTER — OFFICE VISIT (OUTPATIENT)
Dept: SURGERY | Facility: CLINIC | Age: 78
End: 2018-08-08
Payer: MEDICARE

## 2018-08-08 VITALS — WEIGHT: 177 LBS | HEIGHT: 72 IN | BODY MASS INDEX: 23.98 KG/M2

## 2018-08-08 DIAGNOSIS — Z87.448 HISTORY OF HYDRONEPHROSIS: ICD-10-CM

## 2018-08-08 DIAGNOSIS — R33.9 URINARY RETENTION: ICD-10-CM

## 2018-08-08 DIAGNOSIS — N39.0 RECURRENT UTI: ICD-10-CM

## 2018-08-08 DIAGNOSIS — N40.1 BENIGN PROSTATIC HYPERPLASIA WITH URINARY RETENTION: ICD-10-CM

## 2018-08-08 DIAGNOSIS — N31.9 NEUROGENIC BLADDER: Primary | ICD-10-CM

## 2018-08-08 DIAGNOSIS — Z87.448 HISTORY OF HEMATURIA: ICD-10-CM

## 2018-08-08 DIAGNOSIS — R33.8 BENIGN PROSTATIC HYPERPLASIA WITH URINARY RETENTION: ICD-10-CM

## 2018-08-08 PROCEDURE — 51703 INSERT BLADDER CATH COMPLEX: CPT | Performed by: UROLOGY

## 2018-08-08 PROCEDURE — 99214 OFFICE O/P EST MOD 30 MIN: CPT | Performed by: UROLOGY

## 2018-08-08 NOTE — PATIENT INSTRUCTIONS
1. Azul catheter changed today. 2.  Please continue routine Azul catheter care and leg bag care as you are doing. 3.  Continue finasteride 5 mg daily    4. Come to the office please in 1 month to have the nurses change her Azul catheter    5.   P

## 2018-08-08 NOTE — PROGRESS NOTES
I was asked to change pt's clark cath by PVK and I went into the room and introduced myself and I verified the pt's name and  and I assisted him out of the W/C and onto the exam table and also to lower his clothing and I positioned him on the exam table

## 2018-08-08 NOTE — PROGRESS NOTES
HPI:    Patient ID: Cece Barajas. is a 76year old male. HPI     History provided by pt and wife.       Urinary retention  10/3/17 [facility] ER; Lena Nath because a outpatient kidney ultrasound 9/27/17 ordered for investigation of worsening kidney function sh finasteride.     Smoking history--smoke previously but stopped many years ago.     Review of previous records:   8/28/2017 visit with Dr. Chet Gifford; gait difficulties, bradykinesia and increased tone, especially in the axial muscles; suggesting Parkinson's performed L3 vertebral augmentation with plastic and cement.  Dr. Shivam Vigil performed kyphoplasty 12/2/17. 12/9/17 Caty Rodriguez infectious disease PA--continue Zosyn IV.  12/10/17 Caty Rodriguez PA with infectious disease = diarrhea, loose stool but not w DAY Disp: 60 tablet Rfl: 1   Lidocaine HCl 2 % External Gel Apply small amt of gel to urinary meatus for pain, but wipe away catheter secretions that result with hydrogen peroxide Disp: 1 Tube Rfl: 3   Alendronate Sodium 35 MG Oral Tab Take 1 tablet (35 mg of motion. Pulmonary/Chest: Effort normal. No respiratory distress.    Abdominal:   Flanks non-tender to percussion or palpation    Genitourinary:   Genitourinary Comments: Catheter is attached to the leg bag with enough slack, no progression of traumatic no hydronephrosis; placement of Azul catheter into urinary bladder; resolution of right hydronephrosis. 9/27/2017 [facility] KIDNEY/BLADDER = Moderate right hydroureteronephrosis and, of uncertain etiology or chronicity;  Extensive post void residual urinary b contamination. Pt is currently asymptomatic.  Discussed treatment options including continuing observation and he agrees.    (R26.424) History of hematuria  07/05/2018 emergency office visit with me; Gross hematuria started immediatly after  nurse removed and in the presence of Alina Grijalva MD.   Electronically Signed: Yareli Lo, 8/8/2018, 11:45 AM.    I, Alina Grijalva MD,  personally performed the services described in this documentation.  All medical record entries made by the scribe were

## 2018-08-10 ENCOUNTER — TELEPHONE (OUTPATIENT)
Dept: CARDIOLOGY CLINIC | Facility: CLINIC | Age: 78
End: 2018-08-10

## 2018-08-10 DIAGNOSIS — I10 HYPERTENSION, UNSPECIFIED TYPE: Primary | ICD-10-CM

## 2018-08-10 RX ORDER — LOSARTAN POTASSIUM 100 MG/1
100 TABLET ORAL DAILY
Qty: 90 TABLET | Refills: 0 | Status: SHIPPED | OUTPATIENT
Start: 2018-08-10 | End: 2018-11-05

## 2018-08-10 NOTE — TELEPHONE ENCOUNTER
Sam Davila was wondering if pt can be given an alternate medication than Valsartan. Pls call. Thank you.

## 2018-08-10 NOTE — TELEPHONE ENCOUNTER
Rx sent and lab order placed. Maria Guadalupe Guardado notified, verbalized understanding and denied further questions.

## 2018-08-14 ENCOUNTER — OFFICE VISIT (OUTPATIENT)
Dept: NEUROLOGY | Facility: CLINIC | Age: 78
End: 2018-08-14
Payer: MEDICARE

## 2018-08-14 VITALS
HEART RATE: 63 BPM | SYSTOLIC BLOOD PRESSURE: 150 MMHG | HEIGHT: 72 IN | BODY MASS INDEX: 23.98 KG/M2 | RESPIRATION RATE: 16 BRPM | DIASTOLIC BLOOD PRESSURE: 82 MMHG | WEIGHT: 177 LBS

## 2018-08-14 DIAGNOSIS — G20 PARKINSON'S DISEASE (HCC): Primary | ICD-10-CM

## 2018-08-14 DIAGNOSIS — R41.89 COGNITIVE IMPAIRMENT: ICD-10-CM

## 2018-08-14 PROCEDURE — 99214 OFFICE O/P EST MOD 30 MIN: CPT | Performed by: OTHER

## 2018-08-14 NOTE — PATIENT INSTRUCTIONS
As of October 6th 2014, the Drug Enforcement Agency [facility]) is reclassifying all hydrocodone combination medications from Schedule III to Schedule II. This includes medications such as Norco, Vicodin, Lortab, Zohydro, and Vicoprofen.     What this means for y

## 2018-08-15 ENCOUNTER — OFFICE VISIT (OUTPATIENT)
Dept: INTERNAL MEDICINE CLINIC | Facility: CLINIC | Age: 78
End: 2018-08-15
Payer: MEDICARE

## 2018-08-15 VITALS
DIASTOLIC BLOOD PRESSURE: 85 MMHG | WEIGHT: 178 LBS | BODY MASS INDEX: 24.11 KG/M2 | HEIGHT: 72 IN | HEART RATE: 63 BPM | SYSTOLIC BLOOD PRESSURE: 135 MMHG | TEMPERATURE: 98 F

## 2018-08-15 DIAGNOSIS — I10 ESSENTIAL HYPERTENSION: ICD-10-CM

## 2018-08-15 DIAGNOSIS — R26.2 DIFFICULTY WALKING: ICD-10-CM

## 2018-08-15 DIAGNOSIS — Z00.00 PREVENTATIVE HEALTH CARE: ICD-10-CM

## 2018-08-15 DIAGNOSIS — R33.9 URINARY RETENTION: ICD-10-CM

## 2018-08-15 DIAGNOSIS — G20 PARKINSON DISEASE (HCC): Primary | ICD-10-CM

## 2018-08-15 DIAGNOSIS — M62.81 NEUROLOGICAL MUSCLE WEAKNESS: ICD-10-CM

## 2018-08-15 PROCEDURE — 99214 OFFICE O/P EST MOD 30 MIN: CPT | Performed by: INTERNAL MEDICINE

## 2018-08-15 NOTE — PROGRESS NOTES
HPI:    Patient ID: Heather Lugo. is a 76year old male.     HPI  Patient comes in for follow-up overall doing okay as per wife is working a little from the kitchen to the living room to the bedroom he wishes he can walk more he has follow-up with a spe Oral Tab Take 1 tablet (5 mg total) by mouth daily. Disp: 30 tablet Rfl: 11   Cyanocobalamin (B-12) 500 MCG Oral Tab Take 1 tablet by mouth daily. Disp:  Rfl:    Vitamin D3 1000 units Oral Tab Take 1,000 Units by mouth daily.  Disp:  Rfl:    Alendronate Sod Neurological: He is alert and oriented to person, place, and time. He has normal reflexes. Pt with advanced parkinson's disease   Skin: Skin is warm and dry. Psychiatric: He has a normal mood and affect.  His behavior is normal. Judgment and thought c

## 2018-08-15 NOTE — PATIENT INSTRUCTIONS
ASSESSMENT/PLAN:   Parkinson disease (hcc)  (primary encounter diagnosis)- continue with current care  Difficulty walking stable uses walker no falls  Essential hypertension controlled continue current treatment check blood pressure at home  Urinary retent

## 2018-08-17 NOTE — PROGRESS NOTES
[facility]    Fadi Trey. is a 76year old male. HPI:     Patient is being seen in follow-up. He previously followed with Dr. Dewey Vargas for Parkinson's disease, notes and workup reviewed.     In summary, he initially pres mg total) by mouth daily. Disp: 30 tablet Rfl: 11   Cyanocobalamin (B-12) 500 MCG Oral Tab Take 1 tablet by mouth daily. Disp:  Rfl:    Vitamin D3 1000 units Oral Tab Take 1,000 Units by mouth daily.  Disp:  Rfl:    Alendronate Sodium 35 MG Oral Tab Take 1 hemorrhage. Moderate/severe volume loss with an area of encephalomalacia (versus prominent sulcus from volume loss) involving the high right frontal vertex.   Mild periventricular and subcortical white matter hypo-attenuation likely relates to chronic smal

## 2018-08-20 ENCOUNTER — APPOINTMENT (OUTPATIENT)
Dept: LAB | Facility: HOSPITAL | Age: 78
End: 2018-08-20
Attending: INTERNAL MEDICINE
Payer: MEDICARE

## 2018-08-20 DIAGNOSIS — I10 HYPERTENSION, UNSPECIFIED TYPE: ICD-10-CM

## 2018-08-20 LAB
ANION GAP SERPL CALC-SCNC: 5 MMOL/L (ref 0–18)
BUN SERPL-MCNC: 17 MG/DL (ref 8–20)
BUN/CREAT SERPL: 14.5 (ref 10–20)
CALCIUM SERPL-MCNC: 9 MG/DL (ref 8.5–10.5)
CHLORIDE SERPL-SCNC: 104 MMOL/L (ref 95–110)
CO2 SERPL-SCNC: 28 MMOL/L (ref 22–32)
CREAT SERPL-MCNC: 1.17 MG/DL (ref 0.5–1.5)
GLUCOSE SERPL-MCNC: 91 MG/DL (ref 70–99)
OSMOLALITY UR CALC.SUM OF ELEC: 285 MOSM/KG (ref 275–295)
POTASSIUM SERPL-SCNC: 3.8 MMOL/L (ref 3.3–5.1)
SODIUM SERPL-SCNC: 137 MMOL/L (ref 136–144)

## 2018-08-20 PROCEDURE — 80048 BASIC METABOLIC PNL TOTAL CA: CPT

## 2018-08-20 PROCEDURE — 36415 COLL VENOUS BLD VENIPUNCTURE: CPT

## 2018-08-21 ENCOUNTER — TELEPHONE (OUTPATIENT)
Dept: INTERNAL MEDICINE CLINIC | Facility: CLINIC | Age: 78
End: 2018-08-21

## 2018-08-30 ENCOUNTER — OFFICE VISIT (OUTPATIENT)
Dept: ENDOCRINOLOGY CLINIC | Facility: CLINIC | Age: 78
End: 2018-08-30
Payer: MEDICARE

## 2018-08-30 VITALS
HEART RATE: 70 BPM | BODY MASS INDEX: 25 KG/M2 | SYSTOLIC BLOOD PRESSURE: 129 MMHG | WEIGHT: 183 LBS | DIASTOLIC BLOOD PRESSURE: 75 MMHG

## 2018-08-30 DIAGNOSIS — M81.0 AGE-RELATED OSTEOPOROSIS WITHOUT CURRENT PATHOLOGICAL FRACTURE: Primary | ICD-10-CM

## 2018-08-30 PROCEDURE — G0463 HOSPITAL OUTPT CLINIC VISIT: HCPCS | Performed by: INTERNAL MEDICINE

## 2018-08-30 PROCEDURE — 96372 THER/PROPH/DIAG INJ SC/IM: CPT | Performed by: INTERNAL MEDICINE

## 2018-08-30 PROCEDURE — 99203 OFFICE O/P NEW LOW 30 MIN: CPT | Performed by: INTERNAL MEDICINE

## 2018-08-30 NOTE — PROGRESS NOTES
Name: Elba Krause. Date: 8/30/2018    Referring Physician: No ref. provider found    CC: Osteoporosis     HISTORY OF PRESENT ILLNESS   Elba Jara is a 76year old male who presents for osteoporosis.     REVIEW OF SYSTEMS  Eyes: no change in vis mouth daily. , Disp: , Rfl:   •  Vitamin D3 1000 units Oral Tab, Take 1,000 Units by mouth daily. , Disp: , Rfl:      Allergies:     Codeine                 OTHER (SEE COMMENTS)    Comment:Pt could not wake up.   Escitalopram                Social History: texture  Hair & Nails:  normal scalp hair     Neuro:  sensory grossly intact and motor grossly intact  Psychiatric:  oriented to time, self, and place  Nutritional:  no abnormal weight gain or loss    ASSESSMENT/PLAN:    1.  Osteoporosis  -Discussed diagnos

## 2018-08-31 ENCOUNTER — APPOINTMENT (OUTPATIENT)
Dept: PHYSICAL THERAPY | Facility: HOSPITAL | Age: 78
End: 2018-08-31
Attending: Other
Payer: MEDICARE

## 2018-09-05 ENCOUNTER — OFFICE VISIT (OUTPATIENT)
Dept: PHYSICAL THERAPY | Facility: HOSPITAL | Age: 78
End: 2018-09-05
Attending: Other
Payer: MEDICARE

## 2018-09-05 DIAGNOSIS — G20 PARKINSON'S DISEASE (HCC): ICD-10-CM

## 2018-09-05 PROCEDURE — 97112 NEUROMUSCULAR REEDUCATION: CPT

## 2018-09-05 PROCEDURE — 97162 PT EVAL MOD COMPLEX 30 MIN: CPT

## 2018-09-05 NOTE — PROGRESS NOTES
NEUROLOGICAL PHYSICAL THERAPY EVALUATION:   Referring Physician: Dr. Javi Dorado  Diagnosis: Parkinson Disease      Date of Onset: 8/14/18 Date of Service: 9/4/2018     PATIENT SUMMARY   Gita Evans is a 76year old y/o male who presents to therapy toda cervical spine  Sensation: WNL both LE's     Coordination Testing:   Finger to Nose: slow speed  Heel-to-Shin: NT  Pronation/Supination: slow speed and decreased accuracy  Toe Tap: slow speed    Tone: Rigidity throughout trunk during functional tasks, limi and Go (AD, time): 2 min   Fall Risk: yes      Today’s Treatment and Response: Pt education provided on current findings, plan for intervention.     Charges: PT Eval x1, NMR x 1      Total Timed Treatment: 45 min     Total Treatment Time: 45 min     PLAN fo

## 2018-09-06 ENCOUNTER — TELEPHONE (OUTPATIENT)
Dept: INTERNAL MEDICINE CLINIC | Facility: CLINIC | Age: 78
End: 2018-09-06

## 2018-09-06 RX ORDER — HYDROCHLOROTHIAZIDE 12.5 MG/1
12.5 TABLET ORAL DAILY
Qty: 90 TABLET | Refills: 3 | Status: SHIPPED | OUTPATIENT
Start: 2018-09-06 | End: 2019-08-29

## 2018-09-06 NOTE — TELEPHONE ENCOUNTER
Ok, can stop the amlodipine and we can try HCTZ 12.5 daily, check bp and let us know if swelling same or worst

## 2018-09-06 NOTE — TELEPHONE ENCOUNTER
Left message on voice mail with Dr. Yamilet Aguilar recommendation and that new Rx for HCTZ 12.5 mg sent to pharmacy  Should monitor BP and let us know. .. Should call if an swelling of feet not improved or worse.

## 2018-09-06 NOTE — TELEPHONE ENCOUNTER
Current Outpatient Prescriptions:   •  AmLODIPine Besylate 10 MG Oral Tab, Take 1 tablet (10 mg total) by mouth once daily. , Disp: 30 tablet, Rfl: 2      Wife called stating that this medication is making patient get swollen feet. No pain involved.     Wa

## 2018-09-07 ENCOUNTER — NURSE ONLY (OUTPATIENT)
Dept: SURGERY | Facility: CLINIC | Age: 78
End: 2018-09-07
Payer: MEDICARE

## 2018-09-07 ENCOUNTER — APPOINTMENT (OUTPATIENT)
Dept: PHYSICAL THERAPY | Facility: HOSPITAL | Age: 78
End: 2018-09-07
Attending: Other
Payer: MEDICARE

## 2018-09-07 DIAGNOSIS — R33.9 URINARY RETENTION: Primary | ICD-10-CM

## 2018-09-07 PROCEDURE — 51702 INSERT TEMP BLADDER CATH: CPT | Performed by: UROLOGY

## 2018-09-07 PROCEDURE — G0463 HOSPITAL OUTPT CLINIC VISIT: HCPCS | Performed by: UROLOGY

## 2018-09-07 RX ORDER — VALSARTAN 160 MG/1
TABLET ORAL
Qty: 180 TABLET | Refills: 0 | Status: ON HOLD | OUTPATIENT
Start: 2018-09-07 | End: 2018-11-03

## 2018-09-07 NOTE — PROGRESS NOTES
Pt presents for nurse visit for monthly catheter change. He is in a w/c, accompanied by wife. Pt was is alert and oriented. Pt properly identified by spelling of last name and   With the assistance of pt's wife,myself and medical assistant, pt was sl

## 2018-09-07 NOTE — TELEPHONE ENCOUNTER
Re: Valsartan  LOV reviewed no change in this medication  Meets refill protocol criteria.  Refill sent  Hypertensive Medications  Protocol Criteria:  · Appointment scheduled with Cardiology in the past 12 months or in the next 3 months  · BMP or CMP in the months Mulu Hurtado MD [id number] 2 3 mths f.u    In 3 months Leonardo Torrez MD [facility] Ophthalmology NP EE    In 3 months Jacquelyn Wolff MD [facility] Cardiology 6 month follow

## 2018-09-12 ENCOUNTER — OFFICE VISIT (OUTPATIENT)
Dept: PHYSICAL THERAPY | Facility: HOSPITAL | Age: 78
End: 2018-09-12
Attending: Other
Payer: MEDICARE

## 2018-09-12 DIAGNOSIS — G20 PARKINSON'S DISEASE (HCC): ICD-10-CM

## 2018-09-12 PROCEDURE — 97112 NEUROMUSCULAR REEDUCATION: CPT

## 2018-09-12 PROCEDURE — 97116 GAIT TRAINING THERAPY: CPT

## 2018-09-12 PROCEDURE — 97530 THERAPEUTIC ACTIVITIES: CPT

## 2018-09-12 NOTE — PROGRESS NOTES
Date  9/12/18            Visit Number  2/10            NMR x            Ther EX             Ther Act x            Gait Training x            CRM              Manual               Diagnosis: Parkinson's Disease  Referring MD(next visit): Dr. Meet Lopez

## 2018-09-17 ENCOUNTER — TELEPHONE (OUTPATIENT)
Dept: INTERNAL MEDICINE CLINIC | Facility: CLINIC | Age: 78
End: 2018-09-17

## 2018-09-17 NOTE — TELEPHONE ENCOUNTER
Wife called worried that patient's blood pressures are dropping too low in morning after starting hctz 10 days ago. Blood pressures 9/14 thru 9/17 first thing in the morning were 104/68, 102/72, 104/70, 105/72.  Wife states she gives him juice or water and

## 2018-09-18 ENCOUNTER — OFFICE VISIT (OUTPATIENT)
Dept: PHYSICAL THERAPY | Facility: HOSPITAL | Age: 78
End: 2018-09-18
Attending: Other
Payer: MEDICARE

## 2018-09-18 DIAGNOSIS — G20 PARKINSON'S DISEASE (HCC): ICD-10-CM

## 2018-09-18 PROCEDURE — 97112 NEUROMUSCULAR REEDUCATION: CPT

## 2018-09-18 PROCEDURE — 97116 GAIT TRAINING THERAPY: CPT

## 2018-09-18 PROCEDURE — 97530 THERAPEUTIC ACTIVITIES: CPT

## 2018-09-18 NOTE — PROGRESS NOTES
Date  9/12/18 9/18/18           Visit Number  2/10 3/10           NMR x x           Ther EX             Ther Act x x           Gait Training x x           CRM              Manual               Diagnosis: Parkinson's Disease  Referring MD(next visit): Dr. Sindy Avina

## 2018-09-21 ENCOUNTER — OFFICE VISIT (OUTPATIENT)
Dept: PHYSICAL THERAPY | Facility: HOSPITAL | Age: 78
End: 2018-09-21
Attending: Other
Payer: MEDICARE

## 2018-09-21 DIAGNOSIS — G20 PARKINSON'S DISEASE (HCC): ICD-10-CM

## 2018-09-21 PROCEDURE — 97116 GAIT TRAINING THERAPY: CPT

## 2018-09-21 PROCEDURE — 97530 THERAPEUTIC ACTIVITIES: CPT

## 2018-09-21 PROCEDURE — 97112 NEUROMUSCULAR REEDUCATION: CPT

## 2018-09-21 NOTE — PROGRESS NOTES
Date  9/12/18 9/18/18 9/21/18          Visit Number  2/10 3/10           NMR x x           Ther EX             Ther Act x x           Gait Training x x           CRM              Manual               Diagnosis: Parkinson's Disease  Referring MD(next visit)

## 2018-09-22 NOTE — TELEPHONE ENCOUNTER
Patient's wife, Niru Rodriguez, called to give us patient's BP:    9/18 154/67 a.m. 127/80 p.m.  9/19 123/75 a.m. 135/75 p.m.  9/20 147/66 a.m. 143/83 p.m.  9/21 119/80 a.m. 136/78 p.m.  9/22 135/70 a.m. She states patient has extreme edema again.   States that t

## 2018-09-22 NOTE — TELEPHONE ENCOUNTER
We can maybe DC the Amlodipine and restart the HCTZ, and see iof the BP is ok and the swelling gets better, I would like the wife to agree to it , let me know

## 2018-09-24 NOTE — TELEPHONE ENCOUNTER
Spoke with wife. She is willing to try the hctz and dc the amlodipine and see if there is an improvement in swelling. She will continue to keep a log and will keep you informed of his bp readings.

## 2018-09-25 ENCOUNTER — OFFICE VISIT (OUTPATIENT)
Dept: PHYSICAL THERAPY | Facility: HOSPITAL | Age: 78
End: 2018-09-25
Attending: Other
Payer: MEDICARE

## 2018-09-25 DIAGNOSIS — G20 PARKINSON'S DISEASE (HCC): ICD-10-CM

## 2018-09-25 PROCEDURE — 97116 GAIT TRAINING THERAPY: CPT

## 2018-09-25 PROCEDURE — 97530 THERAPEUTIC ACTIVITIES: CPT

## 2018-09-25 PROCEDURE — 97112 NEUROMUSCULAR REEDUCATION: CPT

## 2018-09-25 NOTE — PROGRESS NOTES
Date  9/12/18 9/18/18 9/21/18 9/25/18         Visit Number  2/10 3/10 4/10 5/10         NMR x x x x         Ther EX             Ther Act x x x x         Gait Training x x x x         CRM              Manual               Diagnosis: Parkinson's Disease  Ref

## 2018-09-28 ENCOUNTER — OFFICE VISIT (OUTPATIENT)
Dept: PHYSICAL THERAPY | Facility: HOSPITAL | Age: 78
End: 2018-09-28
Attending: Other
Payer: MEDICARE

## 2018-09-28 DIAGNOSIS — G20 PARKINSON'S DISEASE (HCC): ICD-10-CM

## 2018-09-28 PROCEDURE — 97116 GAIT TRAINING THERAPY: CPT

## 2018-09-28 PROCEDURE — 97112 NEUROMUSCULAR REEDUCATION: CPT

## 2018-09-28 PROCEDURE — 97530 THERAPEUTIC ACTIVITIES: CPT

## 2018-09-28 NOTE — PROGRESS NOTES
Date  9/12/18 9/18/18 9/21/18 9/25/18 9/28/18        Visit Number  2/10 3/10 4/10 5/10 6/10        NMR x x x x x        Ther EX             Ther Act x x x x x        Gait Training x x x x x        CRM              Manual               Diagnosis: Parkinson'

## 2018-10-01 ENCOUNTER — TELEPHONE (OUTPATIENT)
Dept: INTERNAL MEDICINE CLINIC | Facility: CLINIC | Age: 78
End: 2018-10-01

## 2018-10-01 NOTE — TELEPHONE ENCOUNTER
09-24th   AM   135/64   PM  129/61  09-25TH  AM    119/66   PM  124/67  09-26TH   AM   119/80   PM  139/61  09-27TH  AM    138/79    PM  142/81  09-28TH  AM    120/80    PM  121/65  09-29 TH  AM    142/81    PM  144/60  09-30TH   AM    119/79    PM  149/69

## 2018-10-03 ENCOUNTER — OFFICE VISIT (OUTPATIENT)
Dept: PHYSICAL THERAPY | Facility: HOSPITAL | Age: 78
End: 2018-10-03
Attending: INTERNAL MEDICINE
Payer: MEDICARE

## 2018-10-03 PROCEDURE — 97112 NEUROMUSCULAR REEDUCATION: CPT

## 2018-10-03 PROCEDURE — 97530 THERAPEUTIC ACTIVITIES: CPT

## 2018-10-03 PROCEDURE — 97116 GAIT TRAINING THERAPY: CPT

## 2018-10-03 NOTE — PROGRESS NOTES
Date  9/12/18 9/18/18 9/21/18 9/25/18 9/28/18 10/3/18       Visit Number  2/10 3/10 4/10 5/10 6/10 7/10       NMR x x x x x x       Ther EX             Ther Act x x x x x x       Gait Training x x x x x x       CRM              Manual               Diagnos

## 2018-10-09 ENCOUNTER — APPOINTMENT (OUTPATIENT)
Dept: PHYSICAL THERAPY | Facility: HOSPITAL | Age: 78
End: 2018-10-09
Attending: INTERNAL MEDICINE
Payer: MEDICARE

## 2018-10-09 ENCOUNTER — NURSE ONLY (OUTPATIENT)
Dept: SURGERY | Facility: CLINIC | Age: 78
End: 2018-10-09
Payer: MEDICARE

## 2018-10-09 DIAGNOSIS — R33.9 URINARY RETENTION: Primary | ICD-10-CM

## 2018-10-09 PROCEDURE — 51702 INSERT TEMP BLADDER CATH: CPT | Performed by: UROLOGY

## 2018-10-09 NOTE — PROGRESS NOTES
I called the pt and his spouse into the exam room and I introduced myself to them and I verified the pt's name and  and I assisted him out of the W/C and onto the exam table and also to lower his clothing to his ankles and I positioned him on the exam t

## 2018-10-10 ENCOUNTER — OFFICE VISIT (OUTPATIENT)
Dept: PHYSICAL THERAPY | Facility: HOSPITAL | Age: 78
End: 2018-10-10
Attending: INTERNAL MEDICINE
Payer: MEDICARE

## 2018-10-10 PROCEDURE — 97112 NEUROMUSCULAR REEDUCATION: CPT

## 2018-10-10 PROCEDURE — 97116 GAIT TRAINING THERAPY: CPT

## 2018-10-10 PROCEDURE — 97530 THERAPEUTIC ACTIVITIES: CPT

## 2018-10-11 NOTE — PROGRESS NOTES
Date  9/12/18 9/18/18 9/21/18 9/25/18 9/28/18 10/3/18 10/10/18      Visit Number  2/10 3/10 4/10 5/10 6/10 7/10 8/10      NMR x x x x x x x      Ther EX             Ther Act x x x x x x x      Gait Training x x x x x x x      CRM              Manual

## 2018-10-22 RX ORDER — FINASTERIDE 5 MG/1
TABLET, FILM COATED ORAL
Qty: 30 TABLET | Refills: 10 | Status: SHIPPED | OUTPATIENT
Start: 2018-10-22 | End: 2019-01-07

## 2018-10-24 ENCOUNTER — OFFICE VISIT (OUTPATIENT)
Dept: PHYSICAL THERAPY | Facility: HOSPITAL | Age: 78
End: 2018-10-24
Attending: INTERNAL MEDICINE
Payer: MEDICARE

## 2018-10-24 PROCEDURE — 97112 NEUROMUSCULAR REEDUCATION: CPT

## 2018-10-24 PROCEDURE — 97530 THERAPEUTIC ACTIVITIES: CPT

## 2018-10-24 PROCEDURE — 97116 GAIT TRAINING THERAPY: CPT

## 2018-10-24 NOTE — PROGRESS NOTES
Date  9/12/18 9/18/18 9/21/18 9/25/18 9/28/18 10/3/18 10/10/18 10/24/18     Visit Number  2/10 3/10 4/10 5/10 6/10 7/10 8/10 9/10     NMR x x x x x x x x     Ther EX             Ther Act x x x x x x x x     Gait Training x x x x x x x x     CRM

## 2018-10-26 ENCOUNTER — OFFICE VISIT (OUTPATIENT)
Dept: PHYSICAL THERAPY | Facility: HOSPITAL | Age: 78
End: 2018-10-26
Attending: INTERNAL MEDICINE
Payer: MEDICARE

## 2018-10-26 PROCEDURE — 97112 NEUROMUSCULAR REEDUCATION: CPT

## 2018-10-26 PROCEDURE — 97116 GAIT TRAINING THERAPY: CPT

## 2018-10-26 PROCEDURE — 97530 THERAPEUTIC ACTIVITIES: CPT

## 2018-10-26 NOTE — PROGRESS NOTES
Patient Name: Sharron Myers., : 1940, MRN: [id number]   Date: 10/26/2018  Referring Physician:  Shama Abbott  Diagnosis:  Parkinsons Disease    Discharge Summary  Pt has attended 10 visits in physical therapy.   Progress Note Start Date:  with feet together   ___2___8. Reaching forward with outstretched arm   ___3___9. Retrieving object from floor   ___3__10. Turning to look behind   ___1__11. Turning 360 degrees (more difficult turning to the R compared to left)  ___0__12.  Placing alternat

## 2018-11-02 ENCOUNTER — HOSPITAL ENCOUNTER (OUTPATIENT)
Facility: HOSPITAL | Age: 78
Setting detail: OBSERVATION
Discharge: HOME | End: 2018-11-03
Attending: EMERGENCY MEDICINE | Admitting: INTERNAL MEDICINE
Payer: MEDICARE

## 2018-11-02 DIAGNOSIS — K92.1 HEMATOCHEZIA: Primary | ICD-10-CM

## 2018-11-02 PROBLEM — R73.9 HYPERGLYCEMIA: Status: ACTIVE | Noted: 2018-11-02

## 2018-11-02 PROBLEM — E87.1 HYPONATREMIA: Status: ACTIVE | Noted: 2018-11-02

## 2018-11-02 RX ORDER — SODIUM CHLORIDE 9 MG/ML
INJECTION, SOLUTION INTRAVENOUS ONCE
Status: COMPLETED | OUTPATIENT
Start: 2018-11-02 | End: 2018-11-02

## 2018-11-03 VITALS
SYSTOLIC BLOOD PRESSURE: 162 MMHG | TEMPERATURE: 98 F | HEIGHT: 68 IN | DIASTOLIC BLOOD PRESSURE: 81 MMHG | RESPIRATION RATE: 16 BRPM | WEIGHT: 179.63 LBS | HEART RATE: 78 BPM | BODY MASS INDEX: 27.22 KG/M2 | OXYGEN SATURATION: 95 %

## 2018-11-03 PROCEDURE — 99214 OFFICE O/P EST MOD 30 MIN: CPT | Performed by: INTERNAL MEDICINE

## 2018-11-03 RX ORDER — LOSARTAN POTASSIUM 100 MG/1
100 TABLET ORAL DAILY
Status: DISCONTINUED | OUTPATIENT
Start: 2018-11-03 | End: 2018-11-03

## 2018-11-03 RX ORDER — ACETAMINOPHEN 325 MG/1
650 TABLET ORAL EVERY 6 HOURS PRN
Status: DISCONTINUED | OUTPATIENT
Start: 2018-11-03 | End: 2018-11-03

## 2018-11-03 RX ORDER — CHOLECALCIFEROL (VITAMIN D3) 125 MCG
500 CAPSULE ORAL
Status: DISCONTINUED | OUTPATIENT
Start: 2018-11-03 | End: 2018-11-03

## 2018-11-03 RX ORDER — HYDROCHLOROTHIAZIDE 25 MG/1
6.25 TABLET ORAL DAILY
Status: DISCONTINUED | OUTPATIENT
Start: 2018-11-03 | End: 2018-11-03

## 2018-11-03 RX ORDER — FINASTERIDE 5 MG/1
5 TABLET, FILM COATED ORAL
Status: DISCONTINUED | OUTPATIENT
Start: 2018-11-03 | End: 2018-11-03

## 2018-11-03 NOTE — ED NOTES
Pt resting on cart, NAD, denies dizziness/HA/lightheaded. VSS, continues on monitor, family at bedside.

## 2018-11-03 NOTE — ED PROVIDER NOTES
Patient Seen in: [facility] Emergency Department    History   Patient presents with:  GI Bleeding (gastrointestinal)    Stated Complaint: GI bleed    HPI    Present to the emergency department complaining of blood in his stool.   History is obtaine Normal range of motion. Neck supple. Cardiovascular: Normal rate and regular rhythm. No murmur heard. Pulmonary/Chest: Effort normal and breath sounds normal. No respiratory distress. Abdominal: Soft. He exhibits no distension.  There is no tendernes results for these tests on the individual orders.    ABORH (BLOOD TYPE)   ANTIBODY SCREEN   RAINBOW DRAW BLUE   RAINBOW DRAW LAVENDER   RAINBOW DRAW DARK GREEN   RAINBOW DRAW LIGHT GREEN   RAINBOW DRAW GOLD   RAINBOW DRAW LAVENDER TALL (BNP)

## 2018-11-03 NOTE — PLAN OF CARE
Problem: Patient Centered Care  Goal: Patient preferences are identified and integrated in the patient's plan of care  Interventions:  - What would you like us to know as we care for you?   - Provide timely, complete, and accurate information to patient/fa based on assessment  - Modify environment to reduce risk of injury  - Provide assistive devices as appropriate  - Consider OT/PT consult to assist with strengthening/mobility  - Encourage toileting schedule  Outcome: Progressing

## 2018-11-03 NOTE — PLAN OF CARE
GASTROINTESTINAL - ADULT    • Maintains or returns to baseline bowel function Adequate for Discharge        Patient Centered Care    • Patient preferences are identified and integrated in the patient's plan of care Adequate for Discharge        Patient/Fam

## 2018-11-03 NOTE — H&P
[facility]    History and Physical    Buster Byes.  Patient Status:  Observation    1940 MRN [id number]   Location [facility] Attending April Ware MD   Hosp Day # 0 PCP Jean-Pierre Linder MD     Date:  11/3/2018  Date o Years since quittin.2      Smokeless tobacco: Never Used    Alcohol use: No      Alcohol/week: 0.0 oz    Drug use: No    Allergies/Medications: Allergies:   Codeine                 OTHER (SEE COMMENTS)    Comment:Pt could not wake up.   Escitalopram Head: Normocephalic. Eyes: Conjunctivae are normal.   Neck: Neck supple. Cardiovascular: Normal rate and regular rhythm. No murmur heard. Pulmonary/Chest: Breath sounds normal.   Abdominal: Soft.  Bowel sounds are normal. He exhibits no distension

## 2018-11-03 NOTE — CONSULTS
[facility] - [facility]    Report of Consultation    Brie Bhandari.  Patient Status:  Observation    1940 MRN [id number]   Location [address] [facility] Attending Haydee Britton MD   Hosp Day # 0 PCP Rodri Vickers MD     Date of Admission:  6 Concern  Caffeine Concern        Yes    Comment:tea; 1 cup daily   Exercise                No    Social History Narrative    The patient uses the following assistive device(s):  rolling walker and wheelchair.       The patient does not live in a home with s Review of Systems:   GENERAL HEALTH: feels well otherwise, denies fever or weight loss  SKIN: denies any unusual skin lesions or rashes  EYES: no visual complaints or deficits  HEENT: denies mouth sores  RESPIRATORY: no shortness of breath   CARDIOVASC Impression:   Painless hematochezia most consistent with diverticular or hemorrhoidal bleeding. Differential diagnosis includes neoplasm especially given the fact he has had no recent colonoscopy. Bleeding has stopped. Vital signs are stable.   Hemog

## 2018-11-03 NOTE — ED INITIAL ASSESSMENT (HPI)
Pt to ER with c/o \"large amount of blood\" in depends prior to arrival. +red blood noted in diaper per wife. Pt on asa. Pt c/o fatigue. Pt denies chest pain or sob. No respiratory distress noted. 96% on room air. Pt denies abdominal pain. +passing gas.  Pt

## 2018-11-03 NOTE — ED NOTES
Orders for admission, patient is aware of plan and ready to go upstairs. Any questions, please call ED RN Cherie Christy at extension [phone].

## 2018-11-03 NOTE — ED NOTES
Pt A&Ox4 ambulatory and standing upon arrival to ED c/o rectal bleeding. Wife reports pt sat down in chair and she heard him have a BM. When wife looked, she noticed dark red blood in pt's depends. Pt denies abd pain.  Pt denies n/v. Lungs clear bilaterally

## 2018-11-03 NOTE — H&P (VIEW-ONLY)
[facility] - [facility]    Report of Consultation    Gita Meadows.  Patient Status:  Observation    1940 MRN [id number]   Location [address] [facility] Attending Maria Del Carmen Muhammad MD   Hosp Day # 0 PCP Cathryn Oppenheim, MD     Date of Admission:  6 Concern  Caffeine Concern        Yes    Comment:tea; 1 cup daily   Exercise                No    Social History Narrative    The patient uses the following assistive device(s):  rolling walker and wheelchair.       The patient does not live in a home with s Review of Systems:   GENERAL HEALTH: feels well otherwise, denies fever or weight loss  SKIN: denies any unusual skin lesions or rashes  EYES: no visual complaints or deficits  HEENT: denies mouth sores  RESPIRATORY: no shortness of breath   CARDIOVASC Impression:   Painless hematochezia most consistent with diverticular or hemorrhoidal bleeding. Differential diagnosis includes neoplasm especially given the fact he has had no recent colonoscopy. Bleeding has stopped. Vital signs are stable.   Hemog

## 2018-11-03 NOTE — PLAN OF CARE
Problem: Patient Centered Care  Goal: Patient preferences are identified and integrated in the patient's plan of care  Interventions:  - What would you like us to know as we care for you?   - Provide timely, complete, and accurate information to patient/fa based on assessment  - Modify environment to reduce risk of injury  - Provide assistive devices as appropriate  - Consider OT/PT consult to assist with strengthening/mobility  - Encourage toileting schedule  Outcome: Progressing      Comments: No bowel mov

## 2018-11-05 ENCOUNTER — PATIENT OUTREACH (OUTPATIENT)
Dept: CASE MANAGEMENT | Age: 78
End: 2018-11-05

## 2018-11-05 DIAGNOSIS — Z02.9 ENCOUNTERS FOR ADMINISTRATIVE PURPOSE: ICD-10-CM

## 2018-11-06 NOTE — PROGRESS NOTES
Lopez Aaron [phone] for post hospital follow up, [facility] contact information provided.

## 2018-11-07 ENCOUNTER — NURSE ONLY (OUTPATIENT)
Dept: SURGERY | Facility: CLINIC | Age: 78
End: 2018-11-07
Payer: MEDICARE

## 2018-11-07 DIAGNOSIS — R33.9 URINARY RETENTION: Primary | ICD-10-CM

## 2018-11-07 PROCEDURE — 51702 INSERT TEMP BLADDER CATH: CPT | Performed by: UROLOGY

## 2018-11-07 NOTE — TELEPHONE ENCOUNTER
Renae Gowers calling for a refill on the following medication     Current Outpatient Medications:   •  losartan 100 MG Oral Tab, Take 1 tablet (100 mg total) by mouth daily. , Disp: 90 tablet, Rfl: 0

## 2018-11-07 NOTE — PROGRESS NOTES
Pt presents for nurse visit for monthly catheter change. He is in a w/c, accompanied by wife.  Pt was is alert and oriented. Pt properly identified by spelling of last name and   With the assistance of pt's wife, pt was slowly and safely transferred o

## 2018-11-08 RX ORDER — LOSARTAN POTASSIUM 100 MG/1
TABLET ORAL
Qty: 90 TABLET | Refills: 1 | Status: SHIPPED | OUTPATIENT
Start: 2018-11-08 | End: 2019-01-11 | Stop reason: RX

## 2018-11-08 NOTE — TELEPHONE ENCOUNTER
Verified medication dose from 8/10/18 RH TE, Meets protocol , refill order sent          Hypertensive Medications  Protocol Criteria:  · Appointment scheduled with Cardiology in the past 12 months or in the next 3 months  · BMP or CMP in the past 12 months 134 (L) 11/02/2018    K 3.9 11/02/2018     11/02/2018    CO2 25 11/02/2018    GLOBULIN 2.5 04/18/2018    ANIONGAP 8 11/02/2018    OSMOCALC 281 11/02/2018

## 2018-11-09 ENCOUNTER — HOSPITAL ENCOUNTER (OUTPATIENT)
Facility: HOSPITAL | Age: 78
Setting detail: HOSPITAL OUTPATIENT SURGERY
Discharge: HOME | End: 2018-11-09
Attending: INTERNAL MEDICINE | Admitting: INTERNAL MEDICINE
Payer: MEDICARE

## 2018-11-09 DIAGNOSIS — K92.1 HEMATOCHEZIA: ICD-10-CM

## 2018-11-09 PROCEDURE — 88305 TISSUE EXAM BY PATHOLOGIST: CPT | Performed by: INTERNAL MEDICINE

## 2018-11-09 PROCEDURE — 99152 MOD SED SAME PHYS/QHP 5/>YRS: CPT | Performed by: INTERNAL MEDICINE

## 2018-11-09 PROCEDURE — 99153 MOD SED SAME PHYS/QHP EA: CPT | Performed by: INTERNAL MEDICINE

## 2018-11-09 PROCEDURE — 0DBH8ZX EXCISION OF CECUM, VIA NATURAL OR ARTIFICIAL OPENING ENDOSCOPIC, DIAGNOSTIC: ICD-10-PCS | Performed by: INTERNAL MEDICINE

## 2018-11-09 PROCEDURE — 0DBM8ZX EXCISION OF DESCENDING COLON, VIA NATURAL OR ARTIFICIAL OPENING ENDOSCOPIC, DIAGNOSTIC: ICD-10-PCS | Performed by: INTERNAL MEDICINE

## 2018-11-09 RX ORDER — SODIUM CHLORIDE 0.9 % (FLUSH) 0.9 %
10 SYRINGE (ML) INJECTION AS NEEDED
Status: DISCONTINUED | OUTPATIENT
Start: 2018-11-09 | End: 2018-11-09

## 2018-11-09 RX ORDER — MIDAZOLAM HYDROCHLORIDE 1 MG/ML
INJECTION INTRAMUSCULAR; INTRAVENOUS
Status: DISCONTINUED | OUTPATIENT
Start: 2018-11-09 | End: 2018-11-09

## 2018-11-09 RX ORDER — MIDAZOLAM HYDROCHLORIDE 1 MG/ML
1 INJECTION INTRAMUSCULAR; INTRAVENOUS EVERY 5 MIN PRN
Status: DISCONTINUED | OUTPATIENT
Start: 2018-11-09 | End: 2018-11-09

## 2018-11-09 RX ORDER — SODIUM CHLORIDE, SODIUM LACTATE, POTASSIUM CHLORIDE, CALCIUM CHLORIDE 600; 310; 30; 20 MG/100ML; MG/100ML; MG/100ML; MG/100ML
INJECTION, SOLUTION INTRAVENOUS CONTINUOUS
Status: DISCONTINUED | OUTPATIENT
Start: 2018-11-09 | End: 2018-11-09

## 2018-11-09 NOTE — INTERVAL H&P NOTE
Pre-op Diagnosis: Hematochezia    The above referenced H&P was reviewed by Faizan Molina MD on 11/9/2018, the patient was examined and no significant changes have occurred in the patient's condition since the H&P was performed.   I discussed with the patien

## 2018-11-09 NOTE — OPERATIVE REPORT
Colonoscopy Operative Report    Pre-Operative Diagnosis: Hematochezia    Post-Operative Diagnosis:  -Diffuse diverticulosis and moderate in sigmoid colon  -Small nonbleeding internal hemorrhoids  -Two small polyps removed appeared normal. No evidence of blood.   -No evidence of fresh or old blood throughout colon. -Diffuse diverticulosis. Moderate in sigmoid colon and mild throughout the rest of the colon.   -3mm sessile polyp in cecum removed with biopsy forceps.    -3mm

## 2018-11-10 VITALS
DIASTOLIC BLOOD PRESSURE: 72 MMHG | SYSTOLIC BLOOD PRESSURE: 154 MMHG | RESPIRATION RATE: 20 BRPM | BODY MASS INDEX: 28.79 KG/M2 | WEIGHT: 190 LBS | OXYGEN SATURATION: 99 % | HEIGHT: 68 IN | HEART RATE: 70 BPM

## 2018-11-13 NOTE — PROGRESS NOTES
Spoke to patient over the phone regarding path results -- benign hyperplastic polyps. Given his age and co-morbidities, no further colonoscopies needed.      Tamar Samuels MD

## 2018-11-14 ENCOUNTER — OFFICE VISIT (OUTPATIENT)
Dept: NEUROLOGY | Facility: CLINIC | Age: 78
End: 2018-11-14
Payer: MEDICARE

## 2018-11-14 VITALS
HEIGHT: 72 IN | HEART RATE: 68 BPM | SYSTOLIC BLOOD PRESSURE: 144 MMHG | BODY MASS INDEX: 25.73 KG/M2 | DIASTOLIC BLOOD PRESSURE: 78 MMHG | WEIGHT: 190 LBS

## 2018-11-14 DIAGNOSIS — G20 PARKINSON'S DISEASE (HCC): Primary | ICD-10-CM

## 2018-11-14 DIAGNOSIS — R41.89 COGNITIVE IMPAIRMENT: ICD-10-CM

## 2018-11-14 PROCEDURE — 99213 OFFICE O/P EST LOW 20 MIN: CPT | Performed by: OTHER

## 2018-11-14 NOTE — PROGRESS NOTES
[facility]    Alphonse Richardson is a 76year old male. HPI:     Patient is being seen in follow-up. I saw him in clinic last in August.  He is accompanied by his wife to the visit today.     Since last visit, they did see physical mouth After dinner. Disp:  Rfl:    Vitamin D3 1000 units Oral Tab Take 1,000 Units by mouth daily. Disp:  Rfl:        Allergies:    Codeine                 OTHER (SEE COMMENTS)    Comment:Pt could not wake up.   Escitalopram                  ROS:   GENERA

## 2018-11-19 ENCOUNTER — OFFICE VISIT (OUTPATIENT)
Dept: INTERNAL MEDICINE CLINIC | Facility: CLINIC | Age: 78
End: 2018-11-19
Payer: MEDICARE

## 2018-11-19 VITALS
TEMPERATURE: 97 F | HEART RATE: 56 BPM | BODY MASS INDEX: 25 KG/M2 | WEIGHT: 182 LBS | SYSTOLIC BLOOD PRESSURE: 136 MMHG | OXYGEN SATURATION: 98 % | DIASTOLIC BLOOD PRESSURE: 88 MMHG

## 2018-11-19 DIAGNOSIS — I10 ESSENTIAL HYPERTENSION: Primary | ICD-10-CM

## 2018-11-19 DIAGNOSIS — G20 PARKINSON DISEASE (HCC): ICD-10-CM

## 2018-11-19 DIAGNOSIS — R26.89 BALANCE PROBLEM: ICD-10-CM

## 2018-11-19 DIAGNOSIS — R26.2 DIFFICULTY WALKING: ICD-10-CM

## 2018-11-19 DIAGNOSIS — M80.08XA AGE-RELATED OSTEOPOROSIS WITH CURRENT PATHOL FRACTURE OF VERTEBRA, INITIAL ENCOUNTER (HCC): ICD-10-CM

## 2018-11-19 PROCEDURE — 99214 OFFICE O/P EST MOD 30 MIN: CPT | Performed by: INTERNAL MEDICINE

## 2018-11-19 PROCEDURE — 1111F DSCHRG MED/CURRENT MED MERGE: CPT | Performed by: INTERNAL MEDICINE

## 2018-11-19 NOTE — PATIENT INSTRUCTIONS
ASSESSMENT/PLAN:   Essential hypertension  (primary encounter diagnosis)-continue with current treatment  Age-related osteoporosis with current pathol fracture of vertebra, initial encounter (hcc) patient had a shot but he does not want to do that anymore

## 2018-11-19 NOTE — PROGRESS NOTES
HPI:    Patient ID: Rashida Ferrara is a 76year old male.     HPI  Patient comes in 3 months follow-up overall is doing good denies any complaints he had physical therapy which helped him tremendously as per wife he is followed with neurology notes seen cont acetaminophen 325 MG Oral Tab Take 650 mg by mouth every 6 (six) hours as needed for Pain. Disp:  Rfl:    aspirin EC 81 MG Oral Tab EC Take 1 tablet (81 mg total) by mouth daily.  Disp: 30 tablet Rfl: 3   Cyanocobalamin (B-12) 500 MCG Oral Tab Take 1 tabl motion. Neck supple. No thyromegaly present. Cardiovascular: Normal rate, regular rhythm, normal heart sounds and intact distal pulses. Exam reveals no friction rub. No murmur heard.   Pulmonary/Chest: Effort normal and breath sounds normal. No respirat

## 2018-12-07 ENCOUNTER — NURSE ONLY (OUTPATIENT)
Dept: SURGERY | Facility: CLINIC | Age: 78
End: 2018-12-07
Payer: MEDICARE

## 2018-12-07 DIAGNOSIS — Z97.8 FOLEY CATHETER STATUS: Primary | ICD-10-CM

## 2018-12-07 PROCEDURE — G0463 HOSPITAL OUTPT CLINIC VISIT: HCPCS | Performed by: UROLOGY

## 2018-12-07 PROCEDURE — 51702 INSERT TEMP BLADDER CATH: CPT | Performed by: UROLOGY

## 2018-12-07 NOTE — PROGRESS NOTES
Pt presents for nurse visit for monthly catheter change. He is in a w/c, accompanied by wife.  Pt was is alert and oriented. Pt properly identified by spelling of last name and   With the assistance of pt's wife, pt was slowly and safely transferred o or pt, change their minds about it. Catheter with extension tubing and leg bag, secured to distal leg, using accompanying straps, ensuring good cms to extremity. Urine in leg bag is cloudy pale yellow.  Pt tolerated well, was assisted with dress and then sa

## 2018-12-13 ENCOUNTER — OFFICE VISIT (OUTPATIENT)
Dept: CARDIOLOGY CLINIC | Facility: CLINIC | Age: 78
End: 2018-12-13
Payer: MEDICARE

## 2018-12-13 ENCOUNTER — TELEPHONE (OUTPATIENT)
Dept: SURGERY | Facility: CLINIC | Age: 78
End: 2018-12-13

## 2018-12-13 VITALS
SYSTOLIC BLOOD PRESSURE: 160 MMHG | HEART RATE: 80 BPM | WEIGHT: 182 LBS | BODY MASS INDEX: 24.65 KG/M2 | DIASTOLIC BLOOD PRESSURE: 80 MMHG | HEIGHT: 72 IN

## 2018-12-13 DIAGNOSIS — R60.9 EDEMA, UNSPECIFIED TYPE: ICD-10-CM

## 2018-12-13 DIAGNOSIS — I10 HYPERTENSION, UNSPECIFIED TYPE: Primary | ICD-10-CM

## 2018-12-13 DIAGNOSIS — R93.89 ABNORMAL FINDINGS ON DIAGNOSTIC IMAGING OF CARDIOVASCULAR SYSTEM: ICD-10-CM

## 2018-12-13 PROCEDURE — 99214 OFFICE O/P EST MOD 30 MIN: CPT | Performed by: INTERNAL MEDICINE

## 2018-12-13 PROCEDURE — G0463 HOSPITAL OUTPT CLINIC VISIT: HCPCS | Performed by: INTERNAL MEDICINE

## 2018-12-13 NOTE — PROGRESS NOTES
Esperanza Aranda is a 76year old male. Patient presents with: Follow - Up: Six month follow up    HPI:   This is a pleasant 58-year-old with Parkinson's hypertension and chronic ankle swelling who presents for follow-up.   He still uses a wheelchair to help g • UTI (urinary tract infection)       Social History:  Social History    Tobacco Use      Smoking status: Former Smoker        Quit date: 1962        Years since quittin.3      Smokeless tobacco: Never Used    Alcohol use: No      Alcohol/week: problems to treat blood pressure echo and trace ankle swelling  The patient indicates understanding of these issues and agrees to the plan. The patient is asked to return in 6 months.              Kindar Ortiz MD  12/13/2018  2:12 PM

## 2018-12-13 NOTE — TELEPHONE ENCOUNTER
Phoned wife back and spoke with her. She states pt is c/o of a lot of pain and his testicles look a \"little reddened and swollen. \" denies visualizing any blood from penis. Denies fever. States urine is draining into clark bag, and it is clear yellow.  Sta

## 2018-12-13 NOTE — TELEPHONE ENCOUNTER
Pts spouse states pt is having a lot of discomfort in prostate area, states area looks swollen, requesting to speak to RN. Pls call thank you.

## 2018-12-14 ENCOUNTER — NURSE ONLY (OUTPATIENT)
Dept: SURGERY | Facility: CLINIC | Age: 78
End: 2018-12-14
Payer: MEDICARE

## 2018-12-14 DIAGNOSIS — R82.90 ABNORMAL URINALYSIS: Primary | ICD-10-CM

## 2018-12-14 PROCEDURE — G0463 HOSPITAL OUTPT CLINIC VISIT: HCPCS | Performed by: UROLOGY

## 2018-12-14 PROCEDURE — 51702 INSERT TEMP BLADDER CATH: CPT | Performed by: UROLOGY

## 2018-12-14 NOTE — TELEPHONE ENCOUNTER
Phoned wife and spoke with her. Read to her 's note as outlined below in this encounter, in it's entirety. She verbalized understanding,agrees to plan, and is thankful.  She will bring pt in for nurse visit today, for c&s collection from amber sharma

## 2018-12-14 NOTE — PROGRESS NOTES
Pt presents with his wife for nurse visit for urine c&s from clark catheter. See t.e. For orders from [id number]. Pt brought back to exam room whereby he was properly identified.  With the assistance of his wife, he was safely transferred onto exam table and lo

## 2018-12-14 NOTE — TELEPHONE ENCOUNTER
Please call wife back. If patient is having bladder pain or penile pain much worse than typical, then urine culture needs to be obtained.   With respect to redness of the skin of the scrotum, this should be evaluated by either a visiting nurse or emergency

## 2018-12-16 RX ORDER — NITROFURANTOIN 25; 75 MG/1; MG/1
100 CAPSULE ORAL 2 TIMES DAILY
Qty: 20 CAPSULE | Refills: 0 | Status: SHIPPED | OUTPATIENT
Start: 2018-12-16 | End: 2019-02-08

## 2018-12-17 ENCOUNTER — TELEPHONE (OUTPATIENT)
Dept: SURGERY | Facility: CLINIC | Age: 78
End: 2018-12-17

## 2018-12-17 NOTE — TELEPHONE ENCOUNTER
----- Message from Dion Rich MD sent at 12/16/2018  7:08 PM CST -----  Please call patient's wife since patient has difficulty speaking after CVA--he was in the office for acute bladder spasms with chronic indwelling Azul catheter in place; is christie

## 2018-12-17 NOTE — TELEPHONE ENCOUNTER
Phoned pt and spoke with wife. Read to her 's note as outlined below. Wife verbalized understanding, agrees to plan, and is thankful.

## 2019-01-01 ENCOUNTER — TELEPHONE (OUTPATIENT)
Dept: HEMATOLOGY/ONCOLOGY | Facility: HOSPITAL | Age: 79
End: 2019-01-01

## 2019-01-01 ENCOUNTER — TELEPHONE (OUTPATIENT)
Dept: SURGERY | Facility: CLINIC | Age: 79
End: 2019-01-01

## 2019-01-01 ENCOUNTER — TELEPHONE (OUTPATIENT)
Dept: INTERNAL MEDICINE CLINIC | Facility: CLINIC | Age: 79
End: 2019-01-01

## 2019-01-01 ENCOUNTER — NURSE ONLY (OUTPATIENT)
Dept: SURGERY | Facility: CLINIC | Age: 79
End: 2019-01-01
Payer: MEDICARE

## 2019-01-01 ENCOUNTER — MED REC SCAN ONLY (OUTPATIENT)
Dept: INTERNAL MEDICINE CLINIC | Facility: CLINIC | Age: 79
End: 2019-01-01

## 2019-01-01 DIAGNOSIS — R33.9 URINARY RETENTION: Primary | ICD-10-CM

## 2019-01-01 PROCEDURE — 51702 INSERT TEMP BLADDER CATH: CPT | Performed by: UROLOGY

## 2019-01-07 ENCOUNTER — NURSE ONLY (OUTPATIENT)
Dept: SURGERY | Facility: CLINIC | Age: 79
End: 2019-01-07
Payer: MEDICARE

## 2019-01-07 ENCOUNTER — TELEPHONE (OUTPATIENT)
Dept: CARDIOLOGY CLINIC | Facility: CLINIC | Age: 79
End: 2019-01-07

## 2019-01-07 ENCOUNTER — TELEPHONE (OUTPATIENT)
Dept: SURGERY | Facility: CLINIC | Age: 79
End: 2019-01-07

## 2019-01-07 DIAGNOSIS — Z46.6 ENCOUNTER FOR FOLEY CATHETER REPLACEMENT: Primary | ICD-10-CM

## 2019-01-07 DIAGNOSIS — I42.9 CARDIOMYOPATHY, UNSPECIFIED TYPE (HCC): ICD-10-CM

## 2019-01-07 DIAGNOSIS — I10 HYPERTENSION, UNSPECIFIED TYPE: Primary | ICD-10-CM

## 2019-01-07 PROCEDURE — 51702 INSERT TEMP BLADDER CATH: CPT | Performed by: UROLOGY

## 2019-01-07 RX ORDER — FINASTERIDE 5 MG/1
5 TABLET, FILM COATED ORAL
Qty: 30 TABLET | Refills: 11 | Status: SHIPPED | OUTPATIENT
Start: 2019-01-07 | End: 2020-01-01

## 2019-01-07 NOTE — TELEPHONE ENCOUNTER
Pts wife/Rona calling for pt to ask about rx: Losartan 100 MG which was recalled, pls call at:[phone],thanks.

## 2019-01-07 NOTE — TELEPHONE ENCOUNTER
Patient seen today for clark catheter change. See today's RN progress note. Patient's wife is asking for new Rx for finasteride. Patient's last office visit 8/2018. Sent new Rx per last office note treatment/plan.

## 2019-01-07 NOTE — PROGRESS NOTES
Physician's order verified, patient's name and  verified. Patient assisted to exam table from his wheelchair with the help of his wife. Urine in clark catheter is clear yellow,  Patient's clark catheter balloon deflated of it's entire contents.  Stat loc

## 2019-01-07 NOTE — TELEPHONE ENCOUNTER
Call placed to pharmacy. Message left to call and advise if Mr. Sol Dakins prescribed Losartan was part of the recall. Number left for them to leave answer.  Can attempt to put call thru first.

## 2019-01-09 NOTE — TELEPHONE ENCOUNTER
Spoke with Kris Zendejas, pharmacist.  They do not have any info on the recall due to high volume. Recall is being handled by [facility] and patients are being directed to contact [phone] with their rx info to check status. Pt provided with phone number.   Ple

## 2019-01-09 NOTE — TELEPHONE ENCOUNTER
email received from Michelle Vergara at [facility]. She received call from [employer] returning our call. Cardiology RN will need to try [facility] again.

## 2019-01-10 NOTE — TELEPHONE ENCOUNTER
S/w Mrs. Meehan advised of [facility] RH recommendation to first determine if his losartan is part of the recall. She agreed to contact [facility] this am and let us know.

## 2019-01-11 RX ORDER — LISINOPRIL 10 MG/1
20 TABLET ORAL DAILY
Qty: 180 TABLET | Refills: 1 | Status: SHIPPED | OUTPATIENT
Start: 2019-01-11 | End: 2019-01-11 | Stop reason: CLARIF

## 2019-01-11 RX ORDER — LISINOPRIL 10 MG/1
20 TABLET ORAL DAILY
Qty: 180 TABLET | Refills: 1 | Status: SHIPPED | OUTPATIENT
Start: 2019-01-11 | End: 2019-01-21 | Stop reason: DRUGHIGH

## 2019-01-11 NOTE — TELEPHONE ENCOUNTER
Wife calling to inform MB pts losartan was recalled. Wife requesting alternative medication.  Please call thank you [phone]

## 2019-01-11 NOTE — TELEPHONE ENCOUNTER
S/w Mrs Naresh Kenny we are stopping losartan and starting lisinopril 20 mg po daily. Need BMP in one week, order placed. Requested to keep a bp log to eval new medication.   When we call with lab results will review numbers on bp., or call if bp hig

## 2019-01-15 ENCOUNTER — HOSPITAL ENCOUNTER (OUTPATIENT)
Dept: CV DIAGNOSTICS | Facility: HOSPITAL | Age: 79
Discharge: HOME | End: 2019-01-15
Attending: INTERNAL MEDICINE
Payer: MEDICARE

## 2019-01-15 DIAGNOSIS — R93.89 ABNORMAL FINDINGS ON DIAGNOSTIC IMAGING OF CARDIOVASCULAR SYSTEM: ICD-10-CM

## 2019-01-15 DIAGNOSIS — R60.9 EDEMA, UNSPECIFIED TYPE: ICD-10-CM

## 2019-01-15 DIAGNOSIS — I10 HYPERTENSION, UNSPECIFIED TYPE: ICD-10-CM

## 2019-01-15 PROCEDURE — 93306 TTE W/DOPPLER COMPLETE: CPT | Performed by: INTERNAL MEDICINE

## 2019-01-17 ENCOUNTER — TELEPHONE (OUTPATIENT)
Dept: CARDIOLOGY CLINIC | Facility: CLINIC | Age: 79
End: 2019-01-17

## 2019-01-17 NOTE — TELEPHONE ENCOUNTER
Juan Spears, SERA  P Em Cardio Clinical Staff             Echo appears stable, improved EF, CPM      (HIPAA verified) detailed message left of [facility] RH's note that Echo appears stable with improved EF and to CPM.    S/w Wife Eankarolina Proctor and gave the same i

## 2019-01-18 ENCOUNTER — APPOINTMENT (OUTPATIENT)
Dept: LAB | Age: 79
End: 2019-01-18
Attending: NURSE PRACTITIONER
Payer: MEDICARE

## 2019-01-18 ENCOUNTER — TELEPHONE (OUTPATIENT)
Dept: CARDIOLOGY CLINIC | Facility: CLINIC | Age: 79
End: 2019-01-18

## 2019-01-18 DIAGNOSIS — I42.9 CARDIOMYOPATHY, UNSPECIFIED TYPE (HCC): ICD-10-CM

## 2019-01-18 DIAGNOSIS — I10 HYPERTENSION, UNSPECIFIED TYPE: ICD-10-CM

## 2019-01-18 LAB
ANION GAP SERPL CALC-SCNC: 7 MMOL/L (ref 0–18)
BUN SERPL-MCNC: 21 MG/DL (ref 8–20)
BUN/CREAT SERPL: 19.3 (ref 10–20)
CALCIUM SERPL-MCNC: 8.7 MG/DL (ref 8.5–10.5)
CHLORIDE SERPL-SCNC: 105 MMOL/L (ref 95–110)
CO2 SERPL-SCNC: 26 MMOL/L (ref 22–32)
CREAT SERPL-MCNC: 1.09 MG/DL (ref 0.5–1.5)
GLUCOSE SERPL-MCNC: 80 MG/DL (ref 70–99)
OSMOLALITY UR CALC.SUM OF ELEC: 288 MOSM/KG (ref 275–295)
POTASSIUM SERPL-SCNC: 4 MMOL/L (ref 3.3–5.1)
SODIUM SERPL-SCNC: 138 MMOL/L (ref 136–144)

## 2019-01-18 PROCEDURE — 80048 BASIC METABOLIC PNL TOTAL CA: CPT

## 2019-01-18 PROCEDURE — 36415 COLL VENOUS BLD VENIPUNCTURE: CPT

## 2019-01-21 RX ORDER — LISINOPRIL 30 MG/1
30 TABLET ORAL DAILY
Qty: 90 TABLET | Refills: 1 | Status: SHIPPED | OUTPATIENT
Start: 2019-01-21 | End: 2019-02-20

## 2019-01-21 NOTE — TELEPHONE ENCOUNTER
BPs are high but with hx of Parkinsons will need to be careful with up titration.  INcrease lisinopril to 30mg and see how BPs do in 2 wks

## 2019-01-21 NOTE — TELEPHONE ENCOUNTER
Blood pressure readings were left at  late Friday:    1/12:  154/92  -  53  12:50 pm  1/13:  145/83  -  79    9:00 am  1/13:  123/78  -  56  12:40 pm  1/13:  129/88  -  104  6:45 pm  1/14:  145/82  -  70    9:30 am  1/14:  166/95  -  61    6:50 p

## 2019-01-21 NOTE — TELEPHONE ENCOUNTER
S/w Mrs. Meehan. Advised of APN RH's recommendation to increase lisinopril to 30 mg daily and check BPs in 2 weeks. Agrees with plan.   Medication changed in med rec and new script sent to pharmacy

## 2019-01-21 NOTE — TELEPHONE ENCOUNTER
Mrs. Jerome Castorena called back. Advised [facility]

## 2019-02-08 ENCOUNTER — OFFICE VISIT (OUTPATIENT)
Dept: SURGERY | Facility: CLINIC | Age: 79
End: 2019-02-08
Payer: MEDICARE

## 2019-02-08 VITALS
HEART RATE: 58 BPM | WEIGHT: 182 LBS | SYSTOLIC BLOOD PRESSURE: 162 MMHG | DIASTOLIC BLOOD PRESSURE: 88 MMHG | BODY MASS INDEX: 25 KG/M2

## 2019-02-08 DIAGNOSIS — S37.30XD TRAUMA OF URETHRA, SUBSEQUENT ENCOUNTER: ICD-10-CM

## 2019-02-08 DIAGNOSIS — N40.1 BENIGN PROSTATIC HYPERPLASIA WITH URINARY OBSTRUCTION: ICD-10-CM

## 2019-02-08 DIAGNOSIS — N13.8 BENIGN PROSTATIC HYPERPLASIA WITH URINARY OBSTRUCTION: ICD-10-CM

## 2019-02-08 DIAGNOSIS — K59.00 CONSTIPATION, UNSPECIFIED CONSTIPATION TYPE: ICD-10-CM

## 2019-02-08 DIAGNOSIS — Z87.448 HISTORY OF HEMATURIA: ICD-10-CM

## 2019-02-08 DIAGNOSIS — Z79.82 LONG TERM (CURRENT) USE OF ASPIRIN: ICD-10-CM

## 2019-02-08 DIAGNOSIS — Z87.448 HISTORY OF HYDRONEPHROSIS: ICD-10-CM

## 2019-02-08 DIAGNOSIS — N31.9 NEUROGENIC BLADDER: Primary | ICD-10-CM

## 2019-02-08 DIAGNOSIS — N39.0 RECURRENT UTI: ICD-10-CM

## 2019-02-08 DIAGNOSIS — R33.9 URINARY RETENTION: ICD-10-CM

## 2019-02-08 PROCEDURE — 99214 OFFICE O/P EST MOD 30 MIN: CPT | Performed by: UROLOGY

## 2019-02-08 PROCEDURE — 51702 INSERT TEMP BLADDER CATH: CPT | Performed by: UROLOGY

## 2019-02-08 NOTE — PATIENT INSTRUCTIONS
Aniya Amado M.D.    1.  Azul catheter changed today    2. Please continue routine Azul catheter care and leg bag care as you are doing    3.   Please let us know if you decide that Thuan Campos would want a suprapubic tube or

## 2019-02-08 NOTE — PROGRESS NOTES
I was asked by [facility] to change pt's clark catheter. I went into the exam room with the supplies and I introduced myself to the pt and his spouse and I verified the pt's spelling of his last name and .  I informed that I will perform the pt's monthly clark

## 2019-02-08 NOTE — PROGRESS NOTES
HPI:    Patient ID: Adela Brandt is a [de-identified]year old male. HPI  Urinary retention  Severe urinating problems since 2016 with urinary incontinence both day and night and patient wearing depends since 2016; as of 10/3/17 he has Azul catheter.  10/3/17 [facility] associated flank pain or abdominal pain. It was discovered on renal ultrasound outpatient 9/27/17. Imaging studies since then have shown that the right hydronephrosis has resolved.  He feels this problem is stable.     Parkinson's disease  Diagnosed by neur Azul catheter, with changing of Azul catheter monthly.  Renal failure felt not to be urologic in nature.  Neurogenic bladder. 11/10/2017 office visit with me; Continue finasteride 5 mg; stop tamsulosin.   12/4/17 admitted because of lumbar compression fr Negative for color change. Neurological: Negative for speech difficulty. Psychiatric/Behavioral: The patient is not nervous/anxious. Current Outpatient Medications:  lisinopril 30 MG Oral Tab Take 1 tablet (30 mg total) by mouth daily.  Disp: Onset   • Cancer Father    • Cancer Mother    • Cancer Sister       Social History: Social History    Tobacco Use      Smoking status: Former Smoker        Quit date: 1962        Years since quittin.4      Smokeless tobacco: Never Used    Alcohol tazobactam resistant  10/07/2017 Urine culture = >100,000 CFU/ML Acinetobacter baumannii complex;  Nitrofurantoin >=512 Resistant; Piperacillin + Tazobactam >=128 Resistant; UA RBC = 13; WBC = 3.  10/3/2017 UA = negative; Microscopic not indicated; Creatini to have catheter changed. Patient agrees to follow up in 6 months for re-evaluation.     (N40.1,  R33.8) Benign prostatic hyperplasia with urinary retention  On COLLIN, prostate 1-2+ enlarged, no palpable nodules or indurations.  Discussed continuing long term catheter, because of bloody urethral discharge and I had to insert 20 Western Gisela coude  catheter; tamponade was achieved and bleeding stopped; given Ceftriaxone 1 g IM; urine culture grew multiple species contaminant.   I fully explained to patient the benefits and in the presence of Basilio Pinto MD.   Electronically Signed: Rubina Christianson, 2/8/2019, 11:04 AM.    I, Basilio Pinto MD,  personally performed the services described in this documentation.  All medical record entries made by the scribe were

## 2019-02-14 ENCOUNTER — TELEPHONE (OUTPATIENT)
Dept: NEUROLOGY | Facility: CLINIC | Age: 79
End: 2019-02-14

## 2019-02-14 DIAGNOSIS — G20 PARKINSON'S DISEASE (HCC): Primary | ICD-10-CM

## 2019-02-19 ENCOUNTER — TELEPHONE (OUTPATIENT)
Dept: ENDOCRINOLOGY CLINIC | Facility: CLINIC | Age: 79
End: 2019-02-19

## 2019-02-19 NOTE — TELEPHONE ENCOUNTER
LMTCB- needs appt on or after 3/1 for next Prolia injection. RN visit needed as last seen by MD in 8/2018. IV submitted to check coverage.

## 2019-02-19 NOTE — TELEPHONE ENCOUNTER
Pts wife Lilibeth Graves states that pt does not want to have Prolia injection any longer because it causes more problems with choking and also pain in joints. Please call if any questions.

## 2019-02-20 ENCOUNTER — OFFICE VISIT (OUTPATIENT)
Dept: INTERNAL MEDICINE CLINIC | Facility: CLINIC | Age: 79
End: 2019-02-20
Payer: MEDICARE

## 2019-02-20 VITALS
HEIGHT: 70 IN | OXYGEN SATURATION: 97 % | HEART RATE: 61 BPM | WEIGHT: 185 LBS | TEMPERATURE: 98 F | DIASTOLIC BLOOD PRESSURE: 94 MMHG | SYSTOLIC BLOOD PRESSURE: 182 MMHG | BODY MASS INDEX: 26.48 KG/M2

## 2019-02-20 DIAGNOSIS — G20 PARKINSON DISEASE (HCC): ICD-10-CM

## 2019-02-20 DIAGNOSIS — I10 ESSENTIAL HYPERTENSION: Primary | ICD-10-CM

## 2019-02-20 DIAGNOSIS — R26.2 DIFFICULTY WALKING: ICD-10-CM

## 2019-02-20 DIAGNOSIS — R26.89 BALANCE PROBLEM: ICD-10-CM

## 2019-02-20 PROCEDURE — 99214 OFFICE O/P EST MOD 30 MIN: CPT | Performed by: INTERNAL MEDICINE

## 2019-02-20 RX ORDER — LISINOPRIL 40 MG/1
40 TABLET ORAL DAILY
Qty: 40 TABLET | Refills: 2 | Status: SHIPPED | OUTPATIENT
Start: 2019-02-20 | End: 2019-06-17

## 2019-02-20 NOTE — PROGRESS NOTES
HPI:    Patient ID: Gaston Araiza is a [de-identified]year old male.     HPI  Patient here for follow-up overall he is slowing down a little bit as per wife he is gait is been little slower he has been given referral to physical therapy will start at the end of the mon 1 tablet (12.5 mg total) by mouth daily. (Patient taking differently: Take 12.5 mg by mouth daily. 0.5 TABLET ) Disp: 90 tablet Rfl: 3   aspirin EC 81 MG Oral Tab EC Take 1 tablet (81 mg total) by mouth daily.  Disp: 30 tablet Rfl: 3   Cyanocobalamin (B-12) appears well-developed and well-nourished. HENT:   Head: Normocephalic and atraumatic. Eyes: Conjunctivae and EOM are normal. Pupils are equal, round, and reactive to light. Neck: Normal range of motion. Neck supple. No thyromegaly present.    Cardiov

## 2019-02-21 NOTE — TELEPHONE ENCOUNTER
LMTCB- LM informing them to call back if they would like to discuss any alternatives for osteoporosis treatment

## 2019-02-28 ENCOUNTER — OFFICE VISIT (OUTPATIENT)
Dept: PHYSICAL THERAPY | Facility: HOSPITAL | Age: 79
End: 2019-02-28
Attending: Other
Payer: MEDICARE

## 2019-02-28 DIAGNOSIS — G20 PARKINSON'S DISEASE (HCC): ICD-10-CM

## 2019-02-28 PROCEDURE — 97112 NEUROMUSCULAR REEDUCATION: CPT

## 2019-02-28 PROCEDURE — 97162 PT EVAL MOD COMPLEX 30 MIN: CPT

## 2019-02-28 NOTE — PROGRESS NOTES
NEUROLOGICAL PHYSICAL THERAPY EVALUATION:   Referring Physician: Dr. Gigi Roy  Diagnosis: Parkinson Disease      Date of Onset: 2/14/19 Date of Service: 2/28/19     PATIENT SUMMARY   Chloe Porter is a [de-identified]year old y/o male who presents to therapy today to posture in upper thoracic and cervical spine  Sensation: WNL both LE's     Coordination Testing:   Finger to Nose: slow speed  Heel-to-Shin: NT  Pronation/Supination: slow speed and decreased accuracy  Toe Tap: slow speed    Tone: Rigidity throughout trunk pt. Only able to ambulate 10 ft with FWW   Gait deviations: short step length L>R, shuffling during forward gait, min freezing during turning, flexed posture with no trunk rotation, head down with visual dependence.      Timed Up and Go (AD, time): unable

## 2019-03-01 NOTE — PATIENT INSTRUCTIONS
[facility] Exercises  Do 1-2 times per day    1. Stand at the countertop,  holding on as needed:   A. Reach up with one hand and then there other, alternating. Look up at your hand and really stretch. Do 5 on each side.    Mcnally Barbone one leg out to the s

## 2019-03-06 ENCOUNTER — TELEPHONE (OUTPATIENT)
Dept: INTERNAL MEDICINE CLINIC | Facility: CLINIC | Age: 79
End: 2019-03-06

## 2019-03-06 NOTE — TELEPHONE ENCOUNTER
jus monitor give tylenol for headach but if not better or worst or any other complaints  to take him to ER< continue to check BP and let us know in 2 weeks with numbers

## 2019-03-06 NOTE — TELEPHONE ENCOUNTER
Patients wife [place] called, he started taking the  Lisinopril 40 mg daily 2/22.   Called with BP readings:    2/22 162/112 90  2/24 163/87 79 and 152-89 68  2/27 178/91 73 and 159/80 79  2/28 153/84 85   3/1 161/87 80 and 169/97 60  3/3 159/90 60  3/4 184/7

## 2019-03-07 ENCOUNTER — NURSE ONLY (OUTPATIENT)
Dept: SURGERY | Facility: CLINIC | Age: 79
End: 2019-03-07
Payer: MEDICARE

## 2019-03-07 DIAGNOSIS — R33.9 URINARY RETENTION: Primary | ICD-10-CM

## 2019-03-07 PROCEDURE — 51702 INSERT TEMP BLADDER CATH: CPT | Performed by: UROLOGY

## 2019-03-07 NOTE — PROGRESS NOTES
I called the pt and spouse into the exam room and I introduced myself and verified the pt's name and . I informed that I will perform the pt's monthly clark catheter change.  I then assisted the pt onto the exam table and also to lower his bottom clothin

## 2019-03-08 ENCOUNTER — OFFICE VISIT (OUTPATIENT)
Dept: PHYSICAL THERAPY | Facility: HOSPITAL | Age: 79
End: 2019-03-08
Attending: Other
Payer: MEDICARE

## 2019-03-08 DIAGNOSIS — G20 PARKINSON'S DISEASE (HCC): ICD-10-CM

## 2019-03-08 PROCEDURE — 97112 NEUROMUSCULAR REEDUCATION: CPT

## 2019-03-08 PROCEDURE — 97116 GAIT TRAINING THERAPY: CPT

## 2019-03-08 NOTE — PROGRESS NOTES
Date  3/8/19          Visit Number  2/10          NMR x          Ther EX           Ther Act           Gait Training x          CRM            Manual             Dx: Parkinsons Disease         Insurance:  Medicare    Referring MD: Scooter Knight   Precautions: fal

## 2019-03-12 ENCOUNTER — OFFICE VISIT (OUTPATIENT)
Dept: PHYSICAL THERAPY | Facility: HOSPITAL | Age: 79
End: 2019-03-12
Attending: Other
Payer: MEDICARE

## 2019-03-12 DIAGNOSIS — G20 PARKINSON'S DISEASE (HCC): ICD-10-CM

## 2019-03-12 PROCEDURE — 97116 GAIT TRAINING THERAPY: CPT

## 2019-03-12 PROCEDURE — 97112 NEUROMUSCULAR REEDUCATION: CPT

## 2019-03-12 NOTE — PROGRESS NOTES
Date  3/8/19 3/12/19         Visit Number  2/10 3/10         NMR x x         Ther EX           Ther Act           Gait Training x x         CRM            Manual             Dx: Parkinsons Disease         Insurance:  Medicare    Referring MD: Teetee Winkler

## 2019-03-13 ENCOUNTER — OFFICE VISIT (OUTPATIENT)
Dept: OPHTHALMOLOGY | Facility: CLINIC | Age: 79
End: 2019-03-13
Payer: MEDICARE

## 2019-03-13 DIAGNOSIS — H25.13 AGE-RELATED NUCLEAR CATARACT OF BOTH EYES: Primary | ICD-10-CM

## 2019-03-13 PROCEDURE — 92015 DETERMINE REFRACTIVE STATE: CPT | Performed by: OPHTHALMOLOGY

## 2019-03-13 PROCEDURE — 92004 COMPRE OPH EXAM NEW PT 1/>: CPT | Performed by: OPHTHALMOLOGY

## 2019-03-13 NOTE — PROGRESS NOTES
Renetta Love is a [de-identified]year old male. HPI:     HPI     New patient here for a complete exam. Patient is complaining of blurred NVA, he states that he can't read with his current glasses.  Patient got glasses 2 years ago from Dr. Rosangela Gandara an optomometrist in mouth daily.  0.5 TABLET ) Disp: 90 tablet Rfl: 3   Lidocaine HCl 2 % External Gel Apply small amt of gel to urinary meatus for pain, but wipe away catheter secretions that result with hydrogen peroxide Disp: 1 Tube Rfl: 3   acetaminophen 325 MG Oral Tab Ta Temporal crescent    C/D Ratio 0.25 0.5    Macula Normal Normal    Vessels Normal Normal    Periphery Normal Normal            Refraction     Wearing Rx       Sphere Cylinder Axis Add    Right +4.00 +3.00 018 +2.50    Left +4.00 +3.00 148 +2.50    Type:  P

## 2019-03-14 ENCOUNTER — APPOINTMENT (OUTPATIENT)
Dept: PHYSICAL THERAPY | Facility: HOSPITAL | Age: 79
End: 2019-03-14
Attending: Other
Payer: MEDICARE

## 2019-03-14 ENCOUNTER — TELEPHONE (OUTPATIENT)
Dept: OPHTHALMOLOGY | Facility: CLINIC | Age: 79
End: 2019-03-14

## 2019-03-14 NOTE — TELEPHONE ENCOUNTER
Spoke to pts wife and states that he is doing better since they called. Advised pt to use warm compresses and artifical tears 4x a day. Pt will try this and let us know if he needs to be seen. Pt was seen yesterday for ee with [facility].

## 2019-03-14 NOTE — TELEPHONE ENCOUNTER
Spouse states pts eyes have been red,irritated and burning since appt yesterday when he had drops put in his eyes

## 2019-03-19 ENCOUNTER — OFFICE VISIT (OUTPATIENT)
Dept: PHYSICAL THERAPY | Facility: HOSPITAL | Age: 79
End: 2019-03-19
Attending: Other
Payer: MEDICARE

## 2019-03-19 DIAGNOSIS — G20 PARKINSON'S DISEASE (HCC): ICD-10-CM

## 2019-03-19 PROCEDURE — 97116 GAIT TRAINING THERAPY: CPT

## 2019-03-19 PROCEDURE — 97112 NEUROMUSCULAR REEDUCATION: CPT

## 2019-03-19 NOTE — PROGRESS NOTES
Date  3/8/19 3/12/19 3/19/19        Visit Number  2/10 3/10 4/10        NMR x x x        Ther EX           Ther Act           Gait Training x x x        CRM            Manual             Dx: Parkinsons Disease         Insurance:  Medicare    Referring MD:

## 2019-03-22 ENCOUNTER — OFFICE VISIT (OUTPATIENT)
Dept: PHYSICAL THERAPY | Facility: HOSPITAL | Age: 79
End: 2019-03-22
Attending: Other
Payer: MEDICARE

## 2019-03-22 DIAGNOSIS — G20 PARKINSON'S DISEASE (HCC): ICD-10-CM

## 2019-03-22 PROCEDURE — 97112 NEUROMUSCULAR REEDUCATION: CPT

## 2019-03-22 PROCEDURE — 97116 GAIT TRAINING THERAPY: CPT

## 2019-03-22 NOTE — PROGRESS NOTES
Date  3/8/19 3/12/19 3/19/19 3/22/19       Visit Number  2/10 3/10 4/10 5/10       NMR x x x x       Ther EX           Ther Act           Gait Training x x x x       CRM            Manual             Dx: Parkinsons Disease         Insurance:  Medicare    R

## 2019-03-23 NOTE — LETTER
Patient Name: Esperanza Aranda  YOB: 1940          MRN number:  [id number]  Date:  8/8/2019  Referring Physician: Chasity Mcgregor    ADULT VOICE EVALUATION:    Referring Physician: Dr. Darien Wright  Diagnosis: Parkinson's Disease    Date of Service: 8 Pt presents with mild-moderate hypophonia as evidenced by reduced conversational volume, at an average of 65dB, impacting his daily communication interactions.  At this time, pt is stimulable for SPEAK OUT voice program. Patient and family in agreement and [de-identified] certification required: Yes  I certify the need for these services furnished under this plan of treatment and while under my care.       X______________________________________________ Date________________  Certification From: 4/2/6007      To: negative...

## 2019-03-26 ENCOUNTER — OFFICE VISIT (OUTPATIENT)
Dept: PHYSICAL THERAPY | Facility: HOSPITAL | Age: 79
End: 2019-03-26
Attending: Other
Payer: MEDICARE

## 2019-03-26 DIAGNOSIS — G20 PARKINSON'S DISEASE (HCC): ICD-10-CM

## 2019-03-26 PROCEDURE — 97116 GAIT TRAINING THERAPY: CPT

## 2019-03-26 PROCEDURE — 97112 NEUROMUSCULAR REEDUCATION: CPT

## 2019-03-26 NOTE — PROGRESS NOTES
Date  3/8/19 3/12/19 3/19/19 3/22/19 3/26/19      Visit Number  2/10 3/10 4/10 5/10 6/10      NMR x x x x x      Ther EX           Ther Act           Gait Training x x x x x      CRM            Manual             Dx: Parkinsons Disease         Insurance:

## 2019-03-28 ENCOUNTER — TELEPHONE (OUTPATIENT)
Dept: INTERNAL MEDICINE CLINIC | Facility: CLINIC | Age: 79
End: 2019-03-28

## 2019-03-28 NOTE — TELEPHONE ENCOUNTER
Wife called to let us know what patients BP readings have been at home. Most of the BP readings were taken after breakfast, he took his BP meds while eating breakfast. No BP meds in the evening.      March 7  159/94  HR: 65  March 8  116/64  HR: 68  March

## 2019-03-29 ENCOUNTER — OFFICE VISIT (OUTPATIENT)
Dept: PHYSICAL THERAPY | Facility: HOSPITAL | Age: 79
End: 2019-03-29
Attending: Other
Payer: MEDICARE

## 2019-03-29 DIAGNOSIS — G20 PARKINSON'S DISEASE (HCC): ICD-10-CM

## 2019-03-29 PROCEDURE — 97112 NEUROMUSCULAR REEDUCATION: CPT

## 2019-03-29 PROCEDURE — 97116 GAIT TRAINING THERAPY: CPT

## 2019-03-29 NOTE — PROGRESS NOTES
Date  3/8/19 3/12/19 3/19/19 3/22/19 3/26/19 3/29/19     Visit Number  2/10 3/10 4/10 5/10 6/10 7/10     NMR x x x x x x     Ther EX           Ther Act           Gait Training x x x x x x     CRM            Manual             Dx: Parkinsons Disease

## 2019-04-08 ENCOUNTER — TELEPHONE (OUTPATIENT)
Dept: PHYSICAL THERAPY | Facility: HOSPITAL | Age: 79
End: 2019-04-08

## 2019-04-09 ENCOUNTER — NURSE ONLY (OUTPATIENT)
Dept: SURGERY | Facility: CLINIC | Age: 79
End: 2019-04-09
Payer: MEDICARE

## 2019-04-09 DIAGNOSIS — Z46.6 ENCOUNTER FOR FOLEY CATHETER REPLACEMENT: Primary | ICD-10-CM

## 2019-04-09 PROCEDURE — 51702 INSERT TEMP BLADDER CATH: CPT | Performed by: UROLOGY

## 2019-04-09 NOTE — PROGRESS NOTES
Patient's name and  verified. Physician's order verified. Patient assisted to exam table with assistance from his wife. Patient's urine in clark bag is noted to be clear yellow.   Patient's clark catheter balloon deflated of it's entire contents, clark

## 2019-04-10 ENCOUNTER — APPOINTMENT (OUTPATIENT)
Dept: PHYSICAL THERAPY | Facility: HOSPITAL | Age: 79
End: 2019-04-10
Attending: Other
Payer: MEDICARE

## 2019-04-12 ENCOUNTER — OFFICE VISIT (OUTPATIENT)
Dept: PHYSICAL THERAPY | Facility: HOSPITAL | Age: 79
End: 2019-04-12
Attending: Other
Payer: MEDICARE

## 2019-04-12 PROCEDURE — 97112 NEUROMUSCULAR REEDUCATION: CPT

## 2019-04-12 PROCEDURE — 97530 THERAPEUTIC ACTIVITIES: CPT

## 2019-04-12 NOTE — PROGRESS NOTES
Date  3/8/19 3/12/19 3/19/19 3/22/19 3/26/19 3/29/19 4/12/19    Visit Number  2/10 3/10 4/10 5/10 6/10 7/10 8/10    NMR x x x x x x x    Ther EX           Ther Act           Gait Training x x x x x x x    CRM            Manual             Dx: Annmarie Harada

## 2019-04-15 ENCOUNTER — TELEPHONE (OUTPATIENT)
Dept: INTERNAL MEDICINE CLINIC | Facility: CLINIC | Age: 79
End: 2019-04-15

## 2019-04-15 NOTE — TELEPHONE ENCOUNTER
Pt wife would like the dates of the Pt  last Pneumonia - Tetnus  And Influenza shot.       Also she left a form for Dr Deepti De Los Santos to fill   Call Parkinson disease disability benefits questionnaire ([place])  Please when ready  Dr Francisco J Merchant

## 2019-04-16 ENCOUNTER — OFFICE VISIT (OUTPATIENT)
Dept: PHYSICAL THERAPY | Facility: HOSPITAL | Age: 79
End: 2019-04-16
Attending: Other
Payer: MEDICARE

## 2019-04-16 PROCEDURE — 97112 NEUROMUSCULAR REEDUCATION: CPT

## 2019-04-16 PROCEDURE — 97116 GAIT TRAINING THERAPY: CPT

## 2019-04-16 NOTE — TELEPHONE ENCOUNTER
Pt's spouse informed that pt needs Pneumovax 23, she can set up appt for nurse visit whenever convenient.

## 2019-04-17 NOTE — PROGRESS NOTES
Date  3/8/19 3/12/19 3/19/19 3/22/19 3/26/19 3/29/19 4/12/19 4/16/19   Visit Number  2/10 3/10 4/10 5/10 6/10 7/10 8/10 9/10   NMR x x x x x x x x   Ther EX           Ther Act           Gait Training x x x x x x x x   CRM            Manual             Dx:

## 2019-04-25 ENCOUNTER — OFFICE VISIT (OUTPATIENT)
Dept: PHYSICAL THERAPY | Facility: HOSPITAL | Age: 79
End: 2019-04-25
Attending: Other
Payer: MEDICARE

## 2019-04-25 PROCEDURE — 97530 THERAPEUTIC ACTIVITIES: CPT

## 2019-04-25 PROCEDURE — 97112 NEUROMUSCULAR REEDUCATION: CPT

## 2019-04-25 PROCEDURE — 97116 GAIT TRAINING THERAPY: CPT

## 2019-04-25 NOTE — PROGRESS NOTES
Patient Name: France Voss, : 1940, MRN: [id number]   Date:  2019  Referring Physician:  Juliane Sutherland    Diagnosis: Parkinson Disease, Gait Instability    Insurance:  Medicare    Progress Summary    Pt has attended 10 visits in physical t with min assist to initiate task, was mod assist  Standing unsupported x 1 min, supervised  Simulated boxing- reaching with rotation for targets but without power for actual punching x 2 min- CGA, no UE support    Goals:  1.   Sit to/from stand indep from s

## 2019-04-30 ENCOUNTER — OFFICE VISIT (OUTPATIENT)
Dept: PHYSICAL THERAPY | Facility: HOSPITAL | Age: 79
End: 2019-04-30
Attending: Other
Payer: MEDICARE

## 2019-04-30 PROCEDURE — 97112 NEUROMUSCULAR REEDUCATION: CPT

## 2019-04-30 PROCEDURE — 97116 GAIT TRAINING THERAPY: CPT

## 2019-04-30 NOTE — PROGRESS NOTES
Date  4/30/19          Visit Number  11/20          NMR x          Ther EX           Ther Act x          Gait Training x          CRM            Manual             Dx: Parkinsons Disease         Insurance:  Medicare    Referring MD: Jose Self   Precautions:

## 2019-05-01 ENCOUNTER — OFFICE VISIT (OUTPATIENT)
Dept: INTERNAL MEDICINE CLINIC | Facility: CLINIC | Age: 79
End: 2019-05-01
Payer: MEDICARE

## 2019-05-01 ENCOUNTER — MED REC SCAN ONLY (OUTPATIENT)
Dept: INTERNAL MEDICINE CLINIC | Facility: CLINIC | Age: 79
End: 2019-05-01

## 2019-05-01 VITALS
SYSTOLIC BLOOD PRESSURE: 156 MMHG | TEMPERATURE: 98 F | BODY MASS INDEX: 27 KG/M2 | DIASTOLIC BLOOD PRESSURE: 81 MMHG | HEART RATE: 66 BPM | WEIGHT: 185 LBS | OXYGEN SATURATION: 99 %

## 2019-05-01 DIAGNOSIS — R26.89 BALANCE PROBLEM: Primary | ICD-10-CM

## 2019-05-01 DIAGNOSIS — M62.81 NEUROLOGICAL MUSCLE WEAKNESS: ICD-10-CM

## 2019-05-01 DIAGNOSIS — R33.9 URINARY RETENTION: ICD-10-CM

## 2019-05-01 PROCEDURE — 99214 OFFICE O/P EST MOD 30 MIN: CPT | Performed by: INTERNAL MEDICINE

## 2019-05-01 NOTE — PROGRESS NOTES
HPI:    Patient ID: Renetta Love is a [de-identified]year old male. HPI   Pt comes in with wife for follow up, needs forms filled, pt over all doing ok stable follows with PT and doing ok. No falls recently. Review of Systems   Constitutional: Negative.   Terrell Baez 1,000 Units by mouth daily. Disp:  Rfl:    acetaminophen 325 MG Oral Tab Take 650 mg by mouth every 6 (six) hours as needed for Pain.  Disp:  Rfl:      Allergies:  Escitalopram            HALLUCINATION  Codeine                 OTHER (SEE COMMENTS)    Commen normal and breath sounds normal. No respiratory distress. He has no wheezes. He has no rales. Abdominal: Soft. Bowel sounds are normal. He exhibits no distension. There is no tenderness. There is no rebound and no guarding.    Musculoskeletal: Normal rang

## 2019-05-02 ENCOUNTER — OFFICE VISIT (OUTPATIENT)
Dept: PHYSICAL THERAPY | Facility: HOSPITAL | Age: 79
End: 2019-05-02
Attending: Other
Payer: MEDICARE

## 2019-05-02 PROCEDURE — 97116 GAIT TRAINING THERAPY: CPT

## 2019-05-02 PROCEDURE — 97112 NEUROMUSCULAR REEDUCATION: CPT

## 2019-05-02 NOTE — PROGRESS NOTES
Date  4/30/19 5/2/19         Visit Number  11/20 12/20         NMR x x         Ther EX           Ther Act x x         Gait Training x x         CRM            Manual             Dx: Parkinsons Disease         Insurance:  Medicare    Referring MD: Luis Angel Lemus

## 2019-05-08 ENCOUNTER — APPOINTMENT (OUTPATIENT)
Dept: PHYSICAL THERAPY | Facility: HOSPITAL | Age: 79
End: 2019-05-08
Attending: Other
Payer: MEDICARE

## 2019-05-09 ENCOUNTER — NURSE ONLY (OUTPATIENT)
Dept: SURGERY | Facility: CLINIC | Age: 79
End: 2019-05-09
Payer: MEDICARE

## 2019-05-09 PROCEDURE — G0463 HOSPITAL OUTPT CLINIC VISIT: HCPCS | Performed by: UROLOGY

## 2019-05-09 PROCEDURE — 51702 INSERT TEMP BLADDER CATH: CPT | Performed by: UROLOGY

## 2019-05-15 ENCOUNTER — OFFICE VISIT (OUTPATIENT)
Dept: NEUROLOGY | Facility: CLINIC | Age: 79
End: 2019-05-15
Payer: MEDICARE

## 2019-05-15 VITALS
HEART RATE: 80 BPM | SYSTOLIC BLOOD PRESSURE: 145 MMHG | BODY MASS INDEX: 26.48 KG/M2 | WEIGHT: 185 LBS | HEIGHT: 70 IN | DIASTOLIC BLOOD PRESSURE: 84 MMHG | RESPIRATION RATE: 16 BRPM

## 2019-05-15 DIAGNOSIS — R13.10 DYSPHAGIA, UNSPECIFIED TYPE: ICD-10-CM

## 2019-05-15 DIAGNOSIS — G44.309 POST-CONCUSSION HEADACHE: ICD-10-CM

## 2019-05-15 DIAGNOSIS — G20 PARKINSONISM, UNSPECIFIED PARKINSONISM TYPE (HCC): Primary | ICD-10-CM

## 2019-05-15 DIAGNOSIS — R41.89 COGNITIVE IMPAIRMENT: ICD-10-CM

## 2019-05-15 PROCEDURE — 99214 OFFICE O/P EST MOD 30 MIN: CPT | Performed by: OTHER

## 2019-05-15 RX ORDER — AMLODIPINE BESYLATE 5 MG/1
5 TABLET ORAL DAILY
COMMUNITY
End: 2020-01-01

## 2019-05-15 NOTE — PROGRESS NOTES
[facility]    Gina Bonilla is a [de-identified]year old male. HPI:     Patient is being seen in follow-up. He is accompanied by his wife to the visit today. I saw him in the office last November 2018.     Since last visit, he has had ph Tab EC Take 1 tablet (81 mg total) by mouth daily. Disp: 30 tablet Rfl: 3   Cyanocobalamin (B-12) 500 MCG Oral Tab Take 1 tablet by mouth After dinner. Disp:  Rfl:    Vitamin D3 1000 units Oral Tab Take 1,000 Units by mouth daily.  Disp:  Rfl:        Osmani gait and balance training    –Referral to SLP for swallowing evaluation    –I had a lengthy discussion w/ patient, wife, regarding my clinical impressions and recommendations; specifically we discussed prognosis, as well as topics such as goals of care, ca

## 2019-05-22 ENCOUNTER — APPOINTMENT (OUTPATIENT)
Dept: PHYSICAL THERAPY | Facility: HOSPITAL | Age: 79
End: 2019-05-22
Attending: Other
Payer: MEDICARE

## 2019-05-22 ENCOUNTER — OFFICE VISIT (OUTPATIENT)
Dept: INTERNAL MEDICINE CLINIC | Facility: CLINIC | Age: 79
End: 2019-05-22
Payer: MEDICARE

## 2019-05-22 VITALS
HEART RATE: 60 BPM | TEMPERATURE: 98 F | BODY MASS INDEX: 25.05 KG/M2 | SYSTOLIC BLOOD PRESSURE: 133 MMHG | DIASTOLIC BLOOD PRESSURE: 83 MMHG | RESPIRATION RATE: 16 BRPM | OXYGEN SATURATION: 99 % | WEIGHT: 175 LBS | HEIGHT: 70 IN

## 2019-05-22 DIAGNOSIS — G20 PARKINSON DISEASE (HCC): ICD-10-CM

## 2019-05-22 DIAGNOSIS — R26.89 BALANCE PROBLEMS: ICD-10-CM

## 2019-05-22 DIAGNOSIS — R26.9 ABNORMAL GAIT: ICD-10-CM

## 2019-05-22 DIAGNOSIS — R35.1 BENIGN PROSTATIC HYPERPLASIA WITH NOCTURIA: ICD-10-CM

## 2019-05-22 DIAGNOSIS — R33.9 URINARY RETENTION: ICD-10-CM

## 2019-05-22 DIAGNOSIS — Z13.6 SCREENING FOR CARDIOVASCULAR CONDITION: ICD-10-CM

## 2019-05-22 DIAGNOSIS — N28.1 BILATERAL RENAL CYSTS: ICD-10-CM

## 2019-05-22 DIAGNOSIS — Z00.00 MEDICARE ANNUAL WELLNESS VISIT, SUBSEQUENT: Primary | ICD-10-CM

## 2019-05-22 DIAGNOSIS — Z00.00 ENCOUNTER FOR ANNUAL HEALTH EXAMINATION: ICD-10-CM

## 2019-05-22 DIAGNOSIS — N40.1 BENIGN PROSTATIC HYPERPLASIA WITH NOCTURIA: ICD-10-CM

## 2019-05-22 DIAGNOSIS — M80.08XA AGE-RELATED OSTEOPOROSIS WITH CURRENT PATHOL FRACTURE OF VERTEBRA, INITIAL ENCOUNTER (HCC): ICD-10-CM

## 2019-05-22 DIAGNOSIS — R26.2 DIFFICULTY WALKING: ICD-10-CM

## 2019-05-22 DIAGNOSIS — R53.1 GENERALIZED WEAKNESS: ICD-10-CM

## 2019-05-22 DIAGNOSIS — I10 ESSENTIAL HYPERTENSION: ICD-10-CM

## 2019-05-22 DIAGNOSIS — N31.9 NEUROGENIC BLADDER: ICD-10-CM

## 2019-05-22 DIAGNOSIS — M62.81 NEUROLOGICAL MUSCLE WEAKNESS: ICD-10-CM

## 2019-05-22 PROCEDURE — 90732 PPSV23 VACC 2 YRS+ SUBQ/IM: CPT | Performed by: INTERNAL MEDICINE

## 2019-05-22 PROCEDURE — G0009 ADMIN PNEUMOCOCCAL VACCINE: HCPCS | Performed by: INTERNAL MEDICINE

## 2019-05-22 PROCEDURE — G0439 PPPS, SUBSEQ VISIT: HCPCS | Performed by: INTERNAL MEDICINE

## 2019-05-22 RX ORDER — LACTULOSE 10 G/15ML
20 SOLUTION ORAL DAILY PRN
COMMUNITY

## 2019-05-22 NOTE — PROGRESS NOTES
HPI:   Agustín Sepulveda is a [de-identified]year old male who presents for a Medicare Initial Annual Wellness visit (Once after 12 month Medicare anniversary) .         His last annual assessment has been over 1 year: Annual Physical due on [birth year]21/1942         Fall/Risk do without help   He has Meal Preparation difficulties based on screening of functional status. Preparing your meals: Cannot do without help  He has difficulties Managing Money/Bills based on screening of functional status.    Managing money/bills: Cannot Physician ([address] [place] [address])  Efrain Briggs, PT as Physical Therapist (Physical Therapy)  Tiago John MD as Consulting Physician (NEUROLOGY)    Patient Active Problem List:     Difficulty walking     Balance problem     Neurological muscle weakness     Jessica carbidopa-levodopa  MG Oral Tab Take 2 tablets by mouth 2 (two) times daily. lisinopril 40 MG Oral Tab Take 1 tablet (40 mg total) by mouth daily. finasteride 5 MG Oral Tab Take 1 tablet (5 mg total) by mouth once daily.    hydrochlorothiazide 1 weakness. Psychiatric/Behavioral: Negative.           EXAM:   /83 (BP Location: Right arm, Patient Position: Sitting, Cuff Size: adult)   Pulse 60   Temp 97.9 °F (36.6 °C) (Oral)   Resp 16   Ht 5' 10\" (1.778 m)   Wt 175 lb (79.4 kg)   SpO2 99%   BM canal normal.   Left Ear: Tympanic membrane and ear canal normal.   Nose: Nose normal.   Mouth/Throat: Oropharynx is clear and moist.   Eyes: Pupils are equal, round, and reactive to light. Conjunctivae and EOM are normal.   Neck: Normal range of motion.  Georgette Pederson hyperplasia with nocturia- stable     Balance problems- as above    Generalized weakness- multifactorial,     Bilateral renal cysts- stable     Other orders  -     PNEUMOCOCCAL IMM (PNEUMOVAX)         Diet assessment: good     PLAN:  The patient indicates flowsheet data found. Prostate Cancer Screening      PSA  Annually There are no preventive care reminders to display for this patient.   Update Health Maintenance if applicable     Immunizations (Update Immunization Activity if applicable)     Influenza

## 2019-05-22 NOTE — PATIENT INSTRUCTIONS
Esteban Meehan's SCREENING SCHEDULE   Tests on this list are recommended by your physician but may not be covered, or covered at this frequency, by your insurer. Please check with your insurance carrier before scheduling to verify coverage.     PREVENTATIV years- more often if abnormal There are no preventive care reminders to display for this patient. Update Health Maintenance if applicable    Flex Sigmoidoscopy Screen  Covered every 5 years No results found for this or any previous visit.  No flowsheet data found for this or any previous visit. This may be covered with your prescription benefits, but Medicare does not cover unless Medically needed    Zoster (Not covered by Medicare Part B) No orders found for this or any previous visit.  This may be covered wi 80   10/25/2017 106 (H)    Medicare covers annually or at 6-month intervals for prediabetic patients        Cardiovascular Disease Screening     Cholesterol, covered every 5 yrs including Total, LDL and Trigs LDL Cholesterol (mg/dL)   Date Value   04/18/20 (age 48 or over), COLLIN not paid separately when covered E/M service is provided on same date    Immunizations      Influenza  Covered Annually No orders found for this or any previous visit.  Please get every year    Pneumococcal 13 (Prevnar)  Covered Once a [place])  www. putitinwriting. org  This link also has information from the Eric [address] regarding Advance Directives.

## 2019-05-23 ENCOUNTER — OFFICE VISIT (OUTPATIENT)
Dept: PHYSICAL THERAPY | Facility: HOSPITAL | Age: 79
End: 2019-05-23
Attending: Other
Payer: MEDICARE

## 2019-05-23 PROCEDURE — 97530 THERAPEUTIC ACTIVITIES: CPT

## 2019-05-23 PROCEDURE — 97116 GAIT TRAINING THERAPY: CPT

## 2019-05-23 PROCEDURE — 97112 NEUROMUSCULAR REEDUCATION: CPT

## 2019-05-23 NOTE — PROGRESS NOTES
Patient Name: Heather Lopez, : 1940, MRN: [id number]   Date: 19  Referring Physician:  Fely Rivera    Diagnosis:  Parkinsons Disease    Discharge Summary  Pt has attended 13 visits in physical therapy.   Progress Note Start Date: 19  End Da ___3___4. Standing to sitting   ___2___5. Transfers   ___2___6. Standing with eyes closed   ___0___7. Standing with feet together   ___2___8. Reaching forward with outstretched arm   ___0___9. Retrieving object from floor   ___2__10.  Turning to look behi

## 2019-05-29 ENCOUNTER — APPOINTMENT (OUTPATIENT)
Dept: LAB | Facility: HOSPITAL | Age: 79
End: 2019-05-29
Attending: INTERNAL MEDICINE
Payer: MEDICARE

## 2019-05-29 PROCEDURE — 84443 ASSAY THYROID STIM HORMONE: CPT | Performed by: INTERNAL MEDICINE

## 2019-05-29 PROCEDURE — 80061 LIPID PANEL: CPT | Performed by: INTERNAL MEDICINE

## 2019-05-29 PROCEDURE — 36415 COLL VENOUS BLD VENIPUNCTURE: CPT | Performed by: INTERNAL MEDICINE

## 2019-06-06 ENCOUNTER — NURSE ONLY (OUTPATIENT)
Dept: SURGERY | Facility: CLINIC | Age: 79
End: 2019-06-06
Payer: MEDICARE

## 2019-06-06 DIAGNOSIS — Z46.6 ENCOUNTER FOR FOLEY CATHETER REPLACEMENT: Primary | ICD-10-CM

## 2019-06-06 PROCEDURE — 51702 INSERT TEMP BLADDER CATH: CPT | Performed by: UROLOGY

## 2019-06-10 ENCOUNTER — TELEPHONE (OUTPATIENT)
Dept: INTERNAL MEDICINE CLINIC | Facility: CLINIC | Age: 79
End: 2019-06-10

## 2019-06-10 NOTE — TELEPHONE ENCOUNTER
Called wife back to inform her that disability form was faxed back to [facility] on 5/6/19.

## 2019-06-10 NOTE — TELEPHONE ENCOUNTER
Patients wife Dayton called, she was told by the [place] that they sent a form to Dr. Ronn Christy to complete for patient. Can you please confirm that it was received?

## 2019-06-11 NOTE — PROGRESS NOTES
Late entry; patient presented to office on Thursday 6/6/19 for monthly clark catheter change. Patient assisted to [facility] table with help from his wife. Patient's clark catheter balloon deflated, clark gently removed.  Patient prepped and draped in usual ster

## 2019-06-17 RX ORDER — LISINOPRIL 40 MG/1
TABLET ORAL
Qty: 90 TABLET | Refills: 1 | Status: SHIPPED | OUTPATIENT
Start: 2019-06-17 | End: 2020-01-01

## 2019-06-17 NOTE — TELEPHONE ENCOUNTER
Refill passed per [facility] protocol.   Hypertensive Medications  Protocol Criteria:  · Appointment scheduled in the past 6 months or in the next 3 months  · BMP or CMP in the past 12 months  · Creatinine result < 2  Recent Outpatient Visits Zaheer Faustin MD [address] [place] [address] 6month f/u        Lab Results   Component Value Date    GLU 80 01/18/2019    BUN 21 (H) 01/18/2019    CREATSERUM 1.09 01/18/2019    BUNCREA 19.3 01/18/2019    GFRNAA >60 01/18/2019    GFRAA >60 01/18/2019

## 2019-07-09 ENCOUNTER — NURSE ONLY (OUTPATIENT)
Dept: SURGERY | Facility: CLINIC | Age: 79
End: 2019-07-09
Payer: MEDICARE

## 2019-07-09 DIAGNOSIS — Z46.6 ENCOUNTER FOR FOLEY CATHETER REPLACEMENT: Primary | ICD-10-CM

## 2019-07-09 PROCEDURE — 51702 INSERT TEMP BLADDER CATH: CPT | Performed by: UROLOGY

## 2019-07-09 NOTE — PROGRESS NOTES
See 19 Last office note for physician's order. Patient's name and  verified. Patient assisted to CMG table with help from his wife. Patient's urine in clark bag noted to be clear yellow.   Patient's clark catheter balloon deflated of all it's cont

## 2019-07-11 ENCOUNTER — OFFICE VISIT (OUTPATIENT)
Dept: SPEECH THERAPY | Facility: HOSPITAL | Age: 79
End: 2019-07-11
Attending: OTOLARYNGOLOGY
Payer: MEDICARE

## 2019-07-11 DIAGNOSIS — R13.10 DYSPHAGIA, UNSPECIFIED TYPE: ICD-10-CM

## 2019-07-11 PROCEDURE — 92610 EVALUATE SWALLOWING FUNCTION: CPT

## 2019-07-11 NOTE — PATIENT INSTRUCTIONS
SWALLOWING INSTRUCTIONS    DIET: CHOPPED DIET EXTRA SAUCES/GRAVIES   LIQUIDS: THIN LIQUIDS, SMALL SIPS     ____ SIT UPRIGHT    ____ SMALL BITES AND SIPS    ____ EAT SLOWLY    ____ ALTERNATED LIQUIDS AND SOLIDS    ____ Carlito Harrow WITH EACH BITE      PL sounds:           Stella Rojas 171         Count        Goat Gold      Garden    Garlic      Correct    Caution      Cage                  Guilt   Gas       Gather     Juan Manuel Carrasquillo

## 2019-07-11 NOTE — PROGRESS NOTES
ADULT SWALLOWING EVALUATION:   Referring Physician: Dr. Yuan Lopez  Parkinson's Diease  Date of Service: 7/11/2019     PATIENT SUMMARY:   Agustín Sepulveda is a [de-identified]year old y/o male who presents to therapy today with complaints of difficulty swallowing  that sta acceptance and bilabial seal across all trials. No anterior loss of bolus on any consistency given. Bolus formation and a-p propulsion appeared functional and timely across all consistency.  Mastication of solid from breakfast tray appeared functional and t anterior spillage observed across the entire evaluation. Pt with intact bite; however, mildly impaired ARELI d/t increased bolus preparation/formation. Pharyngeal response appears delayed with reduced hyolaryngeal elevation/excursion via palpation.  Pt x1 thr voice therapy. Dr. Jeff Linares contacted via in-basket for VFSS orders. 3. Ongoing dysphagia therapy to monitor meals, monitor CXR, and continue exercises.       Precautions: Aspiration        OBJECTIVE:   ORAL MOTOR EXAMINATION  Dentition: Functional   Facia signs or symptoms of aspiration with 100 % accuracy over 3-4 session(s). Goal #2 The patient/family/caregiver will demonstrate understanding and implementation of aspiration precautions and swallow strategies independently over 3-4 session(s).    Goal #3

## 2019-07-17 ENCOUNTER — TELEPHONE (OUTPATIENT)
Dept: NEUROLOGY | Facility: CLINIC | Age: 79
End: 2019-07-17

## 2019-07-17 DIAGNOSIS — G20 PARKINSONISM, UNSPECIFIED PARKINSONISM TYPE (HCC): Primary | ICD-10-CM

## 2019-07-18 ENCOUNTER — OFFICE VISIT (OUTPATIENT)
Dept: SPEECH THERAPY | Facility: HOSPITAL | Age: 79
End: 2019-07-18
Attending: OTOLARYNGOLOGY
Payer: MEDICARE

## 2019-07-18 PROCEDURE — 92526 ORAL FUNCTION THERAPY: CPT

## 2019-07-18 NOTE — PROGRESS NOTES
Dx: Parkinson Disease     Authorized # of Visits:  12         Next MD visit: none scheduled  Fall Risk: standard         Precautions: n/a           Medication Changes since last visit?: No  Subjective: Pt reports eating well at home.  Pt is taking small sip cup. After thorough review of small, single sips, pt able to comply. Nectar thickened liquid also administered if overt CSA persists and downgrade is required.  Pt tolerates chopped solids and thin liquids with strict adherence to safe swallowing compensato

## 2019-07-23 ENCOUNTER — OFFICE VISIT (OUTPATIENT)
Dept: CARDIOLOGY CLINIC | Facility: CLINIC | Age: 79
End: 2019-07-23
Payer: MEDICARE

## 2019-07-23 VITALS
BODY MASS INDEX: 27.58 KG/M2 | SYSTOLIC BLOOD PRESSURE: 148 MMHG | RESPIRATION RATE: 18 BRPM | HEART RATE: 78 BPM | DIASTOLIC BLOOD PRESSURE: 92 MMHG | WEIGHT: 182 LBS | HEIGHT: 68 IN

## 2019-07-23 DIAGNOSIS — I10 ESSENTIAL HYPERTENSION: ICD-10-CM

## 2019-07-23 DIAGNOSIS — I10 HYPERTENSION, UNSPECIFIED TYPE: Primary | ICD-10-CM

## 2019-07-23 DIAGNOSIS — I42.9 CARDIOMYOPATHY, UNSPECIFIED TYPE (HCC): ICD-10-CM

## 2019-07-23 DIAGNOSIS — R60.9 EDEMA, UNSPECIFIED TYPE: ICD-10-CM

## 2019-07-23 PROCEDURE — 99214 OFFICE O/P EST MOD 30 MIN: CPT | Performed by: INTERNAL MEDICINE

## 2019-07-23 PROCEDURE — G0463 HOSPITAL OUTPT CLINIC VISIT: HCPCS | Performed by: INTERNAL MEDICINE

## 2019-07-23 NOTE — PATIENT INSTRUCTIONS
Continue same meds and monitor leg swelling    Avoid salt and keep your feet up when sitting    Call if new symptoms

## 2019-07-23 NOTE — PROGRESS NOTES
[place] [facility]  PROGRESS NOTE    Renetta Love is a [de-identified]year old male. Patient presents with: Follow - Up  Hypertension  Edema    HPI:   This is a pleasant [de-identified]year old male with Parkinson's hypertension chronic leg swelling who presents for follow-up.   Sin Chronic indwelling Azul catheter    • Enlarged prostate    • Essential hypertension    • Gout    • High blood pressure    • Muscle weakness    • Parkinson disease (HCC)    • UTI (urinary tract infection)       Social History:  Social History    Tobacco Us evidence of significant heart failure. Most of his edema does dependent and they will continue to monitor swelling avoid salt keep his feet up when sitting.   With no symptoms and good blood pressure control at home we will continue medical therapy with hi

## 2019-07-25 ENCOUNTER — OFFICE VISIT (OUTPATIENT)
Dept: SPEECH THERAPY | Facility: HOSPITAL | Age: 79
End: 2019-07-25
Attending: OTOLARYNGOLOGY
Payer: MEDICARE

## 2019-07-25 PROCEDURE — 92526 ORAL FUNCTION THERAPY: CPT

## 2019-07-25 NOTE — PROGRESS NOTES
Dx: Parkinson Disease     Authorized # of Visits:  12         Next MD visit: none scheduled  Fall Risk: standard         Precautions: n/a           Medication Changes since last visit?: No  Subjective: Pt wife reports, \"He coughed a little yesterday allen BOT, laryngeal adduction, laryngeal elevation, and effortful swallow exercises x10 each with maximal support. Pt with x1 immediate reflexive throat clear and cough across sips of thin liquids via straw.  After thorough review of no straws and  small, singl

## 2019-07-25 NOTE — PATIENT INSTRUCTIONS
1. MAY-ME-MY-MO-MOO   2. [address] [place] [address], hold it as long as you can   3. AH gliding up and gliding down   4.  Continue swallowing exercises       Nathaneil Habermann M.S. [address] [facility]  Speech Language Pathologist   Phone Number [phone]

## 2019-07-30 ENCOUNTER — HOSPITAL ENCOUNTER (OUTPATIENT)
Dept: GENERAL RADIOLOGY | Facility: HOSPITAL | Age: 79
Discharge: HOME | End: 2019-07-30
Attending: Other
Payer: MEDICARE

## 2019-07-30 ENCOUNTER — TELEPHONE (OUTPATIENT)
Dept: NEUROLOGY | Facility: CLINIC | Age: 79
End: 2019-07-30

## 2019-07-30 DIAGNOSIS — G20 PARKINSONISM, UNSPECIFIED PARKINSONISM TYPE (HCC): ICD-10-CM

## 2019-07-30 PROCEDURE — 92611 MOTION FLUOROSCOPY/SWALLOW: CPT

## 2019-07-30 PROCEDURE — 74230 X-RAY XM SWLNG FUNCJ C+: CPT | Performed by: OTHER

## 2019-07-30 NOTE — PROGRESS NOTES
ADULT VIDEOFLUOROSCOPIC SWALLOWING STUDY    Referring Physician: Dr. Jose Elizondo  Diagnosis: dysphagia   Date of Service: 7/30/2019   Radiologist: Dr. Nehemias Lees    Dear Dr. Jose Elizondo,    This letter is to inform you of Merlin Maid Videofluoroscopic Swallowing propelled into the pharynx, but not always swallowed. Pharyngeal phase:  Swallows triggered at the tongue base for liquids, at the valleculae for puree and gravitated all the way to the pyriform sinuses with solid consistencies due to oral difficulties. % accuracy over 3 session(s). Goal #2 The patient/family/caregiver will demonstrate understanding and implementation of aspiration precautions and swallow strategies independently over 3 session(s).   Sit upright  Small sips  Small bites  Eat slowly  Swal

## 2019-07-30 NOTE — PATIENT INSTRUCTIONS
SWALLOW INSTRUCTIONS    DIET:  Cut up foods and regular/thin liquids    SIT UPRIGHT    SMALL BITES    SMALL SIPS    EAT SLOWLY    SWALLOW TWICE WITH EACH BITE    NO STRAW        PLEASE CALL WITH QUESTIONS:    James Borges MA/CCC-SLP  Speech Language [facility]

## 2019-08-01 ENCOUNTER — OFFICE VISIT (OUTPATIENT)
Dept: SPEECH THERAPY | Facility: HOSPITAL | Age: 79
End: 2019-08-01
Attending: OTOLARYNGOLOGY
Payer: MEDICARE

## 2019-08-01 PROCEDURE — 92526 ORAL FUNCTION THERAPY: CPT

## 2019-08-01 NOTE — PATIENT INSTRUCTIONS
Continue your dysphagia exercises 2x daily.      Please call [facility] for the [address] [place] [address], [facility] Mickey Lord: [phone]  Phone: [phone] f

## 2019-08-01 NOTE — PROGRESS NOTES
DYSPHAGIA THERAPY DAILY NOTE DISCHARGE SUMMARY   Dx: Parkinson Disease     Authorized # of Visits:  12         Next MD visit: none scheduled  Fall Risk: standard         Precautions: n/a           Medication Changes since last visit?: No  Subjective: Pt wi cheek to cheek x10 each  x10 lingual lateralizations  x10 lingual corner to corner of mouth     x10 lingual lateralizations cheek to cheek    Effortful swallow  x5  x3  x3        Assessment:   7/30/19 VFSS   Mild oropharyngeal dysphagia characterized by tr following dysphagia exercises to 80 accuracy 10- 20 repetitions 2-3x/day. Laryngeal adduction  Laryngeal elevation  Base of tongue exercises  Effortful swallow  Lingual exercises  Met          Plan:   1. D/c skilled swallowing services d/t fxl skills.

## 2019-08-07 ENCOUNTER — OFFICE VISIT (OUTPATIENT)
Dept: SURGERY | Facility: CLINIC | Age: 79
End: 2019-08-07
Payer: MEDICARE

## 2019-08-07 VITALS
DIASTOLIC BLOOD PRESSURE: 78 MMHG | HEART RATE: 67 BPM | BODY MASS INDEX: 28 KG/M2 | SYSTOLIC BLOOD PRESSURE: 122 MMHG | WEIGHT: 182 LBS

## 2019-08-07 DIAGNOSIS — Z79.82 LONG TERM (CURRENT) USE OF ASPIRIN: ICD-10-CM

## 2019-08-07 DIAGNOSIS — Z87.448 HISTORY OF HEMATURIA: ICD-10-CM

## 2019-08-07 DIAGNOSIS — N13.8 BENIGN PROSTATIC HYPERPLASIA WITH URINARY OBSTRUCTION: ICD-10-CM

## 2019-08-07 DIAGNOSIS — N39.0 RECURRENT UTI: ICD-10-CM

## 2019-08-07 DIAGNOSIS — K59.00 CONSTIPATION, UNSPECIFIED CONSTIPATION TYPE: ICD-10-CM

## 2019-08-07 DIAGNOSIS — Z87.448 HISTORY OF HYDRONEPHROSIS: ICD-10-CM

## 2019-08-07 DIAGNOSIS — N40.1 BENIGN PROSTATIC HYPERPLASIA WITH URINARY OBSTRUCTION: ICD-10-CM

## 2019-08-07 DIAGNOSIS — Q54.1 PENILE HYPOSPADIAS: ICD-10-CM

## 2019-08-07 DIAGNOSIS — N31.9 NEUROGENIC BLADDER: Primary | ICD-10-CM

## 2019-08-07 PROCEDURE — 99214 OFFICE O/P EST MOD 30 MIN: CPT | Performed by: UROLOGY

## 2019-08-07 PROCEDURE — G0463 HOSPITAL OUTPT CLINIC VISIT: HCPCS | Performed by: UROLOGY

## 2019-08-07 NOTE — PATIENT INSTRUCTIONS
Cedric King M.D.    1.  Azul catheter changed today.     2.  Please continue routine Azul catheter care and leg bag care as you are doing.     3. Continue finasteride 5 mg daily     4.   Come to the office please in 1 mo

## 2019-08-07 NOTE — PROGRESS NOTES
HPI:    Patient ID: Cathye Fabry is a [de-identified]year old male. HPI    History provided primarily by patient's wife, patient provided some history as well. Urinary Retention  Chronic.  Severe urinating problems since 2016 with urinary incontinence both day a immediately after nurse removed catheter but stopped after I inserted a 20 [employer] Gisela coude catheter. The patient denies any associated fevers or chills as well as any associated blood in clark leg bag.  He denies any recurrence since then and denies any pain o started and tamsulosin continued. 10/18/2017 simple CMG test at [address] [place] [address]; bladder sensory, tone, motor function intact; patient did have detrusor/bladder muscle contraction which was weak.    Previous hydronephrosis, well and urinary retention, resolved after daily times 10 days. 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     HISTORY:  Past Medical History:   Diagnosis Date   • Age-related nuclear cataract of both eyes 3/13/2019 person, place, and time. Skin: Skin is dry. Psychiatric: He has a normal mood and affect. Thought content normal.   Nursing note and vitals reviewed. 08/07/19  1123   BP: 122/78   Pulse: 67   Weight: 182 lb (82.6 kg)         Body mass index is 27. 6 urinary bladder volume of 1194 mL. Decision made to change Azul catheter. Patient is existing Azul catheter balloon is deflated and catheter was removed. Patient is prepped and draped in usual sterile fashion.   Patient's hypospadias extending to th and his wife agree to follow up in 6 months for re-evaluation.      (N40.1,  R33.8) Benign prostatic hyperplasia with urinary retention  On COLLIN, prostate 2-3+ enlarged, 35-40 grams, no palpable nodules or indurations.  The patient and his wife feel this is side effects, and alternatives, and I answered questions concerning them; patient understands all of this and decides to proceed with the following:       Treatment Plan & Patient Instructions    1.   Azul catheter changed today.     2.  Please continue ro

## 2019-08-08 ENCOUNTER — OFFICE VISIT (OUTPATIENT)
Dept: SPEECH THERAPY | Facility: HOSPITAL | Age: 79
End: 2019-08-08
Attending: OTOLARYNGOLOGY
Payer: MEDICARE

## 2019-08-08 PROCEDURE — 92524 BEHAVRAL QUALIT ANALYS VOICE: CPT

## 2019-08-08 NOTE — PATIENT INSTRUCTIONS
Please call [facility] for the [address] [place] [address] Dr Arevalo Pop: [phone]  Phone: [phone]  Fax: [phone]  Email: [email or web address]

## 2019-08-08 NOTE — PROGRESS NOTES
ADULT VOICE EVALUATION:   Referring Physician: Dr. Esmer Breen  Diagnosis: Parkinson's Disease    Date of Service: 8/8/2019     PATIENT SUMMARY:   Haydee Iyer is a [de-identified]year old y/o male who presents to therapy today with complaints of low conversational volu is stimulable for SPEAK OUT voice program. Patient and family in agreement and POC. Osvaldo Duval #1 practiced, reviewed and provided for the patient. Family encouraged to call the [facility] to obtain a SPEAK OUT workbook for therapy.  Clinical con Frequency / Duration: Patient will be seen for 3x/week or a total of 12 visits over a 90 day period.   Treatment will include: speech therapy    Education or treatment limitation: Cognition  Rehab Potential:good    [facility] category and level: Voice, 3

## 2019-08-15 ENCOUNTER — APPOINTMENT (OUTPATIENT)
Dept: SPEECH THERAPY | Facility: HOSPITAL | Age: 79
End: 2019-08-15
Attending: OTOLARYNGOLOGY
Payer: MEDICARE

## 2019-08-19 ENCOUNTER — APPOINTMENT (OUTPATIENT)
Dept: SPEECH THERAPY | Facility: HOSPITAL | Age: 79
End: 2019-08-19
Attending: OTOLARYNGOLOGY
Payer: MEDICARE

## 2019-08-20 ENCOUNTER — OFFICE VISIT (OUTPATIENT)
Dept: SPEECH THERAPY | Facility: HOSPITAL | Age: 79
End: 2019-08-20
Attending: INTERNAL MEDICINE
Payer: MEDICARE

## 2019-08-20 PROCEDURE — 92507 TX SP LANG VOICE COMM INDIV: CPT

## 2019-08-20 NOTE — PROGRESS NOTES
Dx: Parkinson's Disease/Hypophonia        Authorized # of Visits:  12         Next MD visit: none scheduled  Fall Risk: standard         Precautions: n/a           Medication Changes since last visit?: No  Subjective:  Pt and wife report overall improvemen

## 2019-08-20 NOTE — PATIENT INSTRUCTIONS
Tonight I want you to say \"Let's watch a movie with intent! \"     Catalina Dumont #3 8/21/19   AM PRACTICE   PM PRACTICE     LESSON #4 8/22/19   AM PRACTICE   We will do lesson 4 in the session on 8/22         Earline Do M.S. [address] [place] [address]

## 2019-08-22 ENCOUNTER — OFFICE VISIT (OUTPATIENT)
Dept: SPEECH THERAPY | Facility: HOSPITAL | Age: 79
End: 2019-08-22
Attending: OTOLARYNGOLOGY
Payer: MEDICARE

## 2019-08-22 PROCEDURE — 92507 TX SP LANG VOICE COMM INDIV: CPT

## 2019-08-22 NOTE — PATIENT INSTRUCTIONS
SPEAK OUT! Lesson 3  8/23/19   AM   PM     Lesson 4   8/24/19   AM   PM     Lesson 5   8/25/19  AM  PM     Lesson 6  AM before our session     [address]   When you are with your caregiver, speak with intent!!!        Gabbie Charles

## 2019-08-22 NOTE — PROGRESS NOTES
Dx: Parkinson's Disease/Hypophonia        Authorized # of Visits:  12         Next MD visit: none scheduled  Fall Risk: standard         Precautions: n/a           Medication Changes since last visit?: No  Subjective:  Pt reports progress     Objective: Plan:   1.  Continue voice therapy     Skilled Services: 68952 Voice therapy     Charges: [id number]    Total Timed Treatment: 45 min  Total Treatment Time: 45 min

## 2019-08-26 ENCOUNTER — OFFICE VISIT (OUTPATIENT)
Dept: SPEECH THERAPY | Facility: HOSPITAL | Age: 79
End: 2019-08-26
Attending: Other
Payer: MEDICARE

## 2019-08-26 PROCEDURE — 92507 TX SP LANG VOICE COMM INDIV: CPT

## 2019-08-26 NOTE — PATIENT INSTRUCTIONS
LESSON 6  8/26/2019  PM     LESSON 6   8/27/2019   AM         SPEAK WITH INTENT with the workers tomorrow!      Armando Smith M.S. [address] [facility]  Speech Language Pathologist   Phone Number [phone]

## 2019-08-26 NOTE — PROGRESS NOTES
Dx: Parkinson's Disease/Hypophonia        Authorized # of Visits:  12         Next MD visit: none scheduled  Fall Risk: standard         Precautions: n/a           Medication Changes since last visit?: No  Subjective:  Pt reports progress.  Pt's wife states opportunities. 5. Pt will recite numerical sequence at >80dB in 9/10 opportunities. 6. Pt will read phrases/sentences/paragraphs at >75dB in 9/10 opportunities.    7. Pt will complete simple/complex cognitive-linguistic tasks at >75dB in 9/10 opportunit

## 2019-08-27 ENCOUNTER — OFFICE VISIT (OUTPATIENT)
Dept: SPEECH THERAPY | Facility: HOSPITAL | Age: 79
End: 2019-08-27
Attending: Other
Payer: MEDICARE

## 2019-08-27 PROCEDURE — 92507 TX SP LANG VOICE COMM INDIV: CPT

## 2019-08-27 NOTE — PROGRESS NOTES
Dx: Parkinson's Disease/Hypophonia        Authorized # of Visits:  12         Next MD visit: none scheduled  Fall Risk: standard         Precautions: n/a           Medication Changes since last visit?: No  Subjective:  Pt reports progress.  Pt's wife states 9/10 opportunities. 2. Pt will complete voice warm-ups at >85dB in 9/10 opportunities. 3. Pt will sustain /ah/ for 10 seconds at >85dB in 9/10 opportunities. 4. Pt will produce vocal glides at >85dB in 9/10 opportunities.    5. Pt will recite numerica

## 2019-08-27 NOTE — PATIENT INSTRUCTIONS
CONTINUE TO SPEAK WITH INTENT!         8/28/2019   LESSON #7  AM  PM      8/29/2019   LESSON #8  AM   PM     8/3019   LESSON #9   AM   PM    8/31/19   LESSON #10   AM   PM      Continue with lessons until your next schedule appointment .

## 2019-08-28 ENCOUNTER — OFFICE VISIT (OUTPATIENT)
Dept: INTERNAL MEDICINE CLINIC | Facility: CLINIC | Age: 79
End: 2019-08-28
Payer: MEDICARE

## 2019-08-28 VITALS
RESPIRATION RATE: 17 BRPM | BODY MASS INDEX: 28 KG/M2 | HEIGHT: 68 IN | OXYGEN SATURATION: 98 % | TEMPERATURE: 98 F | SYSTOLIC BLOOD PRESSURE: 132 MMHG | HEART RATE: 67 BPM | DIASTOLIC BLOOD PRESSURE: 76 MMHG

## 2019-08-28 DIAGNOSIS — I10 ESSENTIAL HYPERTENSION: ICD-10-CM

## 2019-08-28 DIAGNOSIS — R33.9 URINARY RETENTION: ICD-10-CM

## 2019-08-28 DIAGNOSIS — N40.1 BENIGN PROSTATIC HYPERPLASIA WITH NOCTURIA: ICD-10-CM

## 2019-08-28 DIAGNOSIS — R35.1 BENIGN PROSTATIC HYPERPLASIA WITH NOCTURIA: ICD-10-CM

## 2019-08-28 DIAGNOSIS — M62.81 NEUROLOGICAL MUSCLE WEAKNESS: Primary | ICD-10-CM

## 2019-08-28 DIAGNOSIS — G20 PARKINSON DISEASE (HCC): ICD-10-CM

## 2019-08-28 DIAGNOSIS — R26.9 ABNORMAL GAIT: ICD-10-CM

## 2019-08-28 PROCEDURE — 99214 OFFICE O/P EST MOD 30 MIN: CPT | Performed by: INTERNAL MEDICINE

## 2019-08-28 NOTE — PROGRESS NOTES
HPI:    Patient ID: Mitzi Arzola is a [de-identified]year old male. HPI  Here for 3-month follow-up overall is been doing okay denies any new complaints today his balance has been cane no falls  Review of Systems   Constitutional: Negative.   Negative for fatigue a MCG Oral Tab Take 1 tablet by mouth After dinner. Disp:  Rfl:    Vitamin D3 1000 units Oral Tab Take 1,000 Units by mouth daily.  Disp:  Rfl:      Allergies:  Escitalopram            HALLUCINATION  Codeine                 OTHER (SEE COMMENTS)    Comment:P

## 2019-08-29 ENCOUNTER — APPOINTMENT (OUTPATIENT)
Dept: SPEECH THERAPY | Facility: HOSPITAL | Age: 79
End: 2019-08-29
Attending: OTOLARYNGOLOGY
Payer: MEDICARE

## 2019-08-29 ENCOUNTER — TELEPHONE (OUTPATIENT)
Dept: PHYSICAL THERAPY | Facility: HOSPITAL | Age: 79
End: 2019-08-29

## 2019-08-29 RX ORDER — HYDROCHLOROTHIAZIDE 12.5 MG/1
TABLET ORAL
Qty: 90 TABLET | Refills: 1 | Status: SHIPPED | OUTPATIENT
Start: 2019-08-29 | End: 2020-01-01

## 2019-08-30 NOTE — TELEPHONE ENCOUNTER
Refill passed per [facility] protocol.   Hypertensive Medications  Protocol Criteria:  · Appointment scheduled in the past 6 months or in the next 3 months  · BMP or CMP in the past 12 months  · Creatinine result < 2  Recent Outpatient Visits >60 01/18/2019    GFRAA >60 01/18/2019    CA 8.7 01/18/2019    AST 13 (L) 04/18/2018    ALT 7 (L) 04/18/2018    BILT 0.5 04/18/2018    TP 6.4 04/18/2018    ALB 3.9 04/18/2018     01/18/2019    K 4.0 01/18/2019     01/18/2019    CO2 26 01/18/201

## 2019-09-05 ENCOUNTER — TELEPHONE (OUTPATIENT)
Dept: SURGERY | Facility: CLINIC | Age: 79
End: 2019-09-05

## 2019-09-05 ENCOUNTER — OFFICE VISIT (OUTPATIENT)
Dept: INTERNAL MEDICINE CLINIC | Facility: CLINIC | Age: 79
End: 2019-09-05
Payer: MEDICARE

## 2019-09-05 ENCOUNTER — APPOINTMENT (OUTPATIENT)
Dept: SPEECH THERAPY | Facility: HOSPITAL | Age: 79
End: 2019-09-05
Attending: Other
Payer: MEDICARE

## 2019-09-05 VITALS
OXYGEN SATURATION: 97 % | DIASTOLIC BLOOD PRESSURE: 84 MMHG | TEMPERATURE: 98 F | HEIGHT: 68 IN | RESPIRATION RATE: 17 BRPM | HEART RATE: 66 BPM | BODY MASS INDEX: 27.58 KG/M2 | SYSTOLIC BLOOD PRESSURE: 140 MMHG | WEIGHT: 182 LBS

## 2019-09-05 DIAGNOSIS — B35.1 ONYCHOMYCOSIS: ICD-10-CM

## 2019-09-05 DIAGNOSIS — L81.9 DISCOLORED SKIN: ICD-10-CM

## 2019-09-05 DIAGNOSIS — M79.89 FOOT SWELLING: Primary | ICD-10-CM

## 2019-09-05 PROCEDURE — 99213 OFFICE O/P EST LOW 20 MIN: CPT | Performed by: INTERNAL MEDICINE

## 2019-09-05 NOTE — PROGRESS NOTES
HPI:    Patient ID: Lalo Hardy is a [de-identified]year old male.     HPI  Patient comes in today with complaint of bilateral feet more swollen and also some skin the discoloration noted by wife they did have a party over the weekend then and he might of had some mo Oral Tab Take 650 mg by mouth every 6 (six) hours as needed for Pain. Disp:  Rfl:    Cyanocobalamin (B-12) 500 MCG Oral Tab Take 1 tablet by mouth After dinner. Disp:  Rfl:    Vitamin D3 1000 units Oral Tab Take 1,000 Units by mouth daily.  Disp:  Rfl: ordered in this encounter       Imaging & Referrals:  None       RM#[id number]

## 2019-09-05 NOTE — TELEPHONE ENCOUNTER
Pt was supposed to be seen today for RN visit, but was told to rs for tomorrow. Pt needs to have his catheter changed and can come in tomorrow 3:30 or later.

## 2019-09-07 ENCOUNTER — TELEPHONE (OUTPATIENT)
Dept: INTERNAL MEDICINE CLINIC | Facility: CLINIC | Age: 79
End: 2019-09-07

## 2019-09-09 ENCOUNTER — APPOINTMENT (OUTPATIENT)
Dept: SPEECH THERAPY | Facility: HOSPITAL | Age: 79
End: 2019-09-09
Attending: Other
Payer: MEDICARE

## 2019-09-09 NOTE — TELEPHONE ENCOUNTER
Paging    Message # [phone]         09/07/2019 04:02p   [[email or web address]]  To:  From:  PATEL Castano MD:  Phone#:  ----------------------------------------------------------------------  Cristine Simpson [phone] CORIE MCCARTY [birth year] PT ESSIE PAINTING NOW IN  ER -AMBUL

## 2019-09-12 ENCOUNTER — TELEPHONE (OUTPATIENT)
Dept: SURGERY | Facility: CLINIC | Age: 79
End: 2019-09-12

## 2019-09-12 NOTE — TELEPHONE ENCOUNTER
Pt's wife is calling to set up appt with the nurse on 10-9-19 any time or 10-10-19 late afternoon 2:30pm or 3pm  to have pt's cath changed.   Please call to advise

## 2019-09-18 ENCOUNTER — TELEPHONE (OUTPATIENT)
Dept: PHYSICAL THERAPY | Facility: HOSPITAL | Age: 79
End: 2019-09-18

## 2019-09-18 NOTE — TELEPHONE ENCOUNTER
Sinemet 25/100mg Take 2 tablet BID. #120.     Last filled-12/25/2018    LOV-5/15/2019  NOV-11/19/2019

## 2019-09-26 ENCOUNTER — APPOINTMENT (OUTPATIENT)
Dept: SPEECH THERAPY | Facility: HOSPITAL | Age: 79
End: 2019-09-26
Attending: Other
Payer: MEDICARE

## 2019-09-30 ENCOUNTER — APPOINTMENT (OUTPATIENT)
Dept: SPEECH THERAPY | Facility: HOSPITAL | Age: 79
End: 2019-09-30
Attending: Other
Payer: MEDICARE

## 2019-10-01 ENCOUNTER — TELEPHONE (OUTPATIENT)
Dept: SURGERY | Facility: CLINIC | Age: 79
End: 2019-10-01

## 2019-10-01 NOTE — TELEPHONE ENCOUNTER
Pt's wife called. Pt is at [facility], rehab center. Pt has a back flow with the catheter. Told it can be changed at the rehab center. Caller does not want the rehab center to change it. Pt has a nurse appointment 10-9-19.   Can pt see Dr. Jason Chu

## 2019-10-01 NOTE — TELEPHONE ENCOUNTER
Spoke with wife . She states pt is at [facility] place rehab and the Doctor there told her there is a \"back wash of  Urine in his catheter and a nurse will change the catheter today. \" wife wanted to know if this is ok.  pt has a nurse visit scheduled 10/09t

## 2019-10-03 ENCOUNTER — APPOINTMENT (OUTPATIENT)
Dept: SPEECH THERAPY | Facility: HOSPITAL | Age: 79
End: 2019-10-03
Attending: Other
Payer: MEDICARE

## 2019-10-07 ENCOUNTER — APPOINTMENT (OUTPATIENT)
Dept: SPEECH THERAPY | Facility: HOSPITAL | Age: 79
End: 2019-10-07
Attending: Other
Payer: MEDICARE

## 2019-10-10 ENCOUNTER — APPOINTMENT (OUTPATIENT)
Dept: SPEECH THERAPY | Facility: HOSPITAL | Age: 79
End: 2019-10-10
Attending: Other
Payer: MEDICARE

## 2019-10-11 ENCOUNTER — TELEPHONE (OUTPATIENT)
Dept: INTERNAL MEDICINE CLINIC | Facility: CLINIC | Age: 79
End: 2019-10-11

## 2019-10-11 NOTE — TELEPHONE ENCOUNTER
Annita, would like to inform the doctor that she evaluated the patient today and she will be seeing the patient for the next 4 weeks, twice a week to mobilize him and to teach the caregivers.

## 2019-10-15 ENCOUNTER — TELEPHONE (OUTPATIENT)
Dept: SURGERY | Facility: CLINIC | Age: 79
End: 2019-10-15

## 2019-10-16 ENCOUNTER — MED REC SCAN ONLY (OUTPATIENT)
Dept: INTERNAL MEDICINE CLINIC | Facility: CLINIC | Age: 79
End: 2019-10-16

## 2019-10-18 ENCOUNTER — TELEPHONE (OUTPATIENT)
Dept: INTERNAL MEDICINE CLINIC | Facility: CLINIC | Age: 79
End: 2019-10-18

## 2019-10-18 NOTE — TELEPHONE ENCOUNTER
JUst make harry he take it with a full glass of water, if it a pill maybe it can be crushed and taken in a glass of water ,should continue to take ,if still same symptoms than let me know

## 2019-10-18 NOTE — TELEPHONE ENCOUNTER
Spoke with patient wife Mary Lou Pond , informed of Dr. Fiona Montesinos notes below ;   Wife verbalizes understanding and agrees

## 2019-10-18 NOTE — TELEPHONE ENCOUNTER
Wife states patient came out of [facility] on 10/15 for UTI. Patient prescribed Ciprofloxacin po q 12 hours; has taken 3 pills so far, and after approximately 30 minutes the patient coughs uncontrollably for 5 minutes.      Wife contacted a pharmaci

## 2019-10-22 ENCOUNTER — OFFICE VISIT (OUTPATIENT)
Dept: INTERNAL MEDICINE CLINIC | Facility: CLINIC | Age: 79
End: 2019-10-22
Payer: MEDICARE

## 2019-10-22 VITALS
HEIGHT: 68 IN | WEIGHT: 182 LBS | RESPIRATION RATE: 16 BRPM | OXYGEN SATURATION: 97 % | BODY MASS INDEX: 27.58 KG/M2 | HEART RATE: 64 BPM | SYSTOLIC BLOOD PRESSURE: 133 MMHG | DIASTOLIC BLOOD PRESSURE: 75 MMHG | TEMPERATURE: 97 F

## 2019-10-22 DIAGNOSIS — N30.00 ACUTE CYSTITIS WITHOUT HEMATURIA: ICD-10-CM

## 2019-10-22 DIAGNOSIS — R55 PRE-SYNCOPE: ICD-10-CM

## 2019-10-22 DIAGNOSIS — Z09 HOSPITAL DISCHARGE FOLLOW-UP: Primary | ICD-10-CM

## 2019-10-22 PROCEDURE — 99213 OFFICE O/P EST LOW 20 MIN: CPT | Performed by: INTERNAL MEDICINE

## 2019-10-28 NOTE — PROGRESS NOTES
HPI:    Patient ID: Gaston Araiza is a [de-identified]year old male. HPI   Comes in for follow-up of her he was admitted to the hospital for this is a second time issue.   Found to have a UTI treated also with presyncopal episode he is wearing Holter monitor is feel normal.   Neck: Normal range of motion. Neck supple. No thyromegaly present. Cardiovascular: Normal rate, regular rhythm, normal heart sounds and intact distal pulses. Exam reveals no friction rub. No murmur heard.   Pulmonary/Chest: Effort normal and b

## 2019-10-31 ENCOUNTER — NURSE ONLY (OUTPATIENT)
Dept: SURGERY | Facility: CLINIC | Age: 79
End: 2019-10-31
Payer: MEDICARE

## 2019-10-31 DIAGNOSIS — R39.89 SUSPECTED UTI: Primary | ICD-10-CM

## 2019-10-31 PROCEDURE — 51702 INSERT TEMP BLADDER CATH: CPT | Performed by: UROLOGY

## 2019-10-31 NOTE — PROGRESS NOTES
I called the pt and his spouse into the exam room and I introduced myself and verified the pt's name and . I explained that I will be removing his existing clark cath and replace a new clark cath.  I then assisted the pt onto the exam table and also to l appt in 1 month for the next clark cath change and to call for the urine results on Monday next week . Pt verbalized understanding and compliance.

## 2019-11-04 ENCOUNTER — TELEPHONE (OUTPATIENT)
Dept: SURGERY | Facility: CLINIC | Age: 79
End: 2019-11-04

## 2019-11-04 NOTE — TELEPHONE ENCOUNTER
Pt's wife called to cxl nurse appointment on 11-29-19 at 1:20 pm cath and reschedule to the first week of December.    Please call

## 2019-11-19 ENCOUNTER — OFFICE VISIT (OUTPATIENT)
Dept: NEUROLOGY | Facility: CLINIC | Age: 79
End: 2019-11-19
Payer: MEDICARE

## 2019-11-19 VITALS
SYSTOLIC BLOOD PRESSURE: 122 MMHG | BODY MASS INDEX: 27.28 KG/M2 | HEIGHT: 68 IN | DIASTOLIC BLOOD PRESSURE: 68 MMHG | WEIGHT: 180 LBS

## 2019-11-19 DIAGNOSIS — G23.1 PROGRESSIVE SUPRANUCLEAR PALSY (HCC): Primary | ICD-10-CM

## 2019-11-19 PROCEDURE — 99214 OFFICE O/P EST MOD 30 MIN: CPT | Performed by: OTHER

## 2019-11-19 RX ORDER — VALSARTAN 40 MG/1
40 TABLET ORAL DAILY
COMMUNITY

## 2019-11-20 ENCOUNTER — EXTERNAL FACILITY (OUTPATIENT)
Dept: INTERNAL MEDICINE CLINIC | Facility: CLINIC | Age: 79
End: 2019-11-20

## 2019-11-20 DIAGNOSIS — R26.2 DIFFICULTY WALKING: ICD-10-CM

## 2019-11-20 DIAGNOSIS — M62.81 NEUROLOGICAL MUSCLE WEAKNESS: ICD-10-CM

## 2019-11-20 DIAGNOSIS — R26.89 BALANCE PROBLEM: ICD-10-CM

## 2019-11-20 DIAGNOSIS — G20 PARKINSON DISEASE (HCC): ICD-10-CM

## 2019-11-20 PROCEDURE — G0180 MD CERTIFICATION HHA PATIENT: HCPCS | Performed by: INTERNAL MEDICINE

## 2019-11-20 NOTE — PROGRESS NOTES
[facility]    Beba Bernal is a [de-identified]year old male. HPI:     Patient is being seen in follow-up. He is accompanied by his wife to the visit today. I saw him in the office last in May of this year.     Since last visit, patient • Cyanocobalamin (B-12) 500 MCG Oral Tab Take 1 tablet by mouth After dinner. • Vitamin D3 1000 units Oral Tab Take 1,000 Units by mouth daily.      • LISINOPRIL 40 MG Oral Tab TAKE ONE TABLET BY MOUTH DAILY 90 tablet 1       Allergies:    Escital relatively aggressive progressive illness; I did provide patient/wife with additional reading information on PSP from [employer] website, and we also discussed the [facility] PSP organization to obtain more information as well as support    –Continue current [employer]

## 2019-12-02 NOTE — PROGRESS NOTES
I called the pt into the exam room and I introduced myself and verified the pt's name and . I explained that I will be removing his existing clark cath and replace a new clark cath.  I then assisted the pt onto the exam table and also to lower his bottom

## 2019-12-03 NOTE — TELEPHONE ENCOUNTER
Received med list and fwd to dr for [facility].    Dr approved medication list and I have updated pts med list.

## 2019-12-10 NOTE — TELEPHONE ENCOUNTER
Spoke with Herbert Conteh (Pt's wife). She was appreciative of call and info on palliative care. She states that Ade Mann is not interested in seeing palliative care at this time, but she will call in future, if needed. Sam Davila was appreciative of call.     I

## 2019-12-10 NOTE — TELEPHONE ENCOUNTER
11/19/19- received referral for outpatient Palliative Care from Dr. Jordan Channel    11/29/19- called phone # on chart- no answer, will try again k    12/10/19- Spoke with pt's son Marcia Cue re: scheduling PC appt.  Pt's son was appreciative of call and thankful fo

## 2019-12-30 NOTE — TELEPHONE ENCOUNTER
S/W pt's spouse and informed that we are shorthanded for nurses on thurs and would like to change pt's appt to Friday 1/3 instead.  She agreed to this change and I gave an appt for Friday 1/3 at 3:20 pm.

## 2020-01-01 ENCOUNTER — OFFICE VISIT (OUTPATIENT)
Dept: SURGERY | Facility: CLINIC | Age: 80
End: 2020-01-01
Payer: MEDICARE

## 2020-01-01 ENCOUNTER — TELEPHONE (OUTPATIENT)
Dept: SURGERY | Facility: CLINIC | Age: 80
End: 2020-01-01

## 2020-01-01 ENCOUNTER — APPOINTMENT (OUTPATIENT)
Dept: ULTRASOUND IMAGING | Facility: HOSPITAL | Age: 80
End: 2020-01-01
Payer: MEDICARE

## 2020-01-01 ENCOUNTER — VIRTUAL PHONE E/M (OUTPATIENT)
Dept: NEUROLOGY | Facility: CLINIC | Age: 80
End: 2020-01-01

## 2020-01-01 ENCOUNTER — APPOINTMENT (OUTPATIENT)
Dept: CV DIAGNOSTICS | Facility: HOSPITAL | Age: 80
DRG: 872 | End: 2020-01-01
Attending: PHYSICIAN ASSISTANT
Payer: MEDICARE

## 2020-01-01 ENCOUNTER — NURSE ONLY (OUTPATIENT)
Dept: SURGERY | Facility: CLINIC | Age: 80
End: 2020-01-01
Payer: MEDICARE

## 2020-01-01 ENCOUNTER — TELEPHONE (OUTPATIENT)
Dept: NEUROLOGY | Facility: CLINIC | Age: 80
End: 2020-01-01

## 2020-01-01 ENCOUNTER — TELEPHONE (OUTPATIENT)
Dept: INTERNAL MEDICINE CLINIC | Facility: CLINIC | Age: 80
End: 2020-01-01

## 2020-01-01 ENCOUNTER — EXTERNAL FACILITY (OUTPATIENT)
Dept: INTERNAL MEDICINE CLINIC | Facility: CLINIC | Age: 80
End: 2020-01-01

## 2020-01-01 ENCOUNTER — OFFICE VISIT (OUTPATIENT)
Dept: INTERNAL MEDICINE CLINIC | Facility: CLINIC | Age: 80
End: 2020-01-01
Payer: MEDICARE

## 2020-01-01 ENCOUNTER — NURSE TRIAGE (OUTPATIENT)
Dept: INTERNAL MEDICINE CLINIC | Facility: CLINIC | Age: 80
End: 2020-01-01

## 2020-01-01 ENCOUNTER — APPOINTMENT (OUTPATIENT)
Dept: PICC SERVICES | Facility: HOSPITAL | Age: 80
DRG: 872 | End: 2020-01-01
Attending: PHYSICIAN ASSISTANT
Payer: MEDICARE

## 2020-01-01 ENCOUNTER — HOSPITAL ENCOUNTER (EMERGENCY)
Facility: HOSPITAL | Age: 80
Discharge: HOME | End: 2020-01-01
Attending: EMERGENCY MEDICINE
Payer: MEDICARE

## 2020-01-01 ENCOUNTER — OFFICE VISIT (OUTPATIENT)
Dept: CARDIOLOGY CLINIC | Facility: CLINIC | Age: 80
End: 2020-01-01
Payer: MEDICARE

## 2020-01-01 ENCOUNTER — MED REC SCAN ONLY (OUTPATIENT)
Dept: INTERNAL MEDICINE CLINIC | Facility: CLINIC | Age: 80
End: 2020-01-01

## 2020-01-01 ENCOUNTER — APPOINTMENT (OUTPATIENT)
Dept: GENERAL RADIOLOGY | Facility: HOSPITAL | Age: 80
DRG: 872 | End: 2020-01-01
Attending: EMERGENCY MEDICINE
Payer: MEDICARE

## 2020-01-01 ENCOUNTER — APPOINTMENT (OUTPATIENT)
Dept: CT IMAGING | Facility: HOSPITAL | Age: 80
DRG: 872 | End: 2020-01-01
Attending: EMERGENCY MEDICINE
Payer: MEDICARE

## 2020-01-01 ENCOUNTER — HOSPITAL ENCOUNTER (EMERGENCY)
Facility: HOSPITAL | Age: 80
Discharge: HOME | End: 2020-01-01
Payer: MEDICARE

## 2020-01-01 ENCOUNTER — VIRTUAL PHONE E/M (OUTPATIENT)
Dept: INTERNAL MEDICINE CLINIC | Facility: CLINIC | Age: 80
End: 2020-01-01
Payer: MEDICARE

## 2020-01-01 ENCOUNTER — ANESTHESIA (OUTPATIENT)
Dept: SURGERY | Facility: HOSPITAL | Age: 80
End: 2020-01-01
Payer: MEDICARE

## 2020-01-01 ENCOUNTER — APPOINTMENT (OUTPATIENT)
Dept: GENERAL RADIOLOGY | Facility: HOSPITAL | Age: 80
End: 2020-01-01
Attending: EMERGENCY MEDICINE
Payer: MEDICARE

## 2020-01-01 ENCOUNTER — HOSPITAL ENCOUNTER (INPATIENT)
Facility: HOSPITAL | Age: 80
LOS: 10 days | Discharge: NURSING, REHAB OR LONG-TERM CARE | DRG: 872 | End: 2020-01-01
Attending: EMERGENCY MEDICINE | Admitting: INTERNAL MEDICINE
Payer: MEDICARE

## 2020-01-01 ENCOUNTER — LAB ENCOUNTER (OUTPATIENT)
Dept: LAB | Facility: HOSPITAL | Age: 80
End: 2020-01-01
Attending: INTERNAL MEDICINE
Payer: MEDICARE

## 2020-01-01 ENCOUNTER — APPOINTMENT (OUTPATIENT)
Dept: LAB | Facility: HOSPITAL | Age: 80
End: 2020-01-01
Attending: UROLOGY
Payer: MEDICARE

## 2020-01-01 ENCOUNTER — MED REC SCAN ONLY (OUTPATIENT)
Dept: NEUROLOGY | Facility: CLINIC | Age: 80
End: 2020-01-01

## 2020-01-01 ENCOUNTER — APPOINTMENT (OUTPATIENT)
Dept: ULTRASOUND IMAGING | Facility: HOSPITAL | Age: 80
DRG: 872 | End: 2020-01-01
Attending: UROLOGY
Payer: MEDICARE

## 2020-01-01 ENCOUNTER — APPOINTMENT (OUTPATIENT)
Dept: GENERAL RADIOLOGY | Facility: HOSPITAL | Age: 80
DRG: 872 | End: 2020-01-01
Attending: INTERNAL MEDICINE
Payer: MEDICARE

## 2020-01-01 ENCOUNTER — ANESTHESIA EVENT (OUTPATIENT)
Dept: SURGERY | Facility: HOSPITAL | Age: 80
End: 2020-01-01
Payer: MEDICARE

## 2020-01-01 ENCOUNTER — HOSPITAL ENCOUNTER (OUTPATIENT)
Facility: HOSPITAL | Age: 80
Setting detail: HOSPITAL OUTPATIENT SURGERY
Discharge: HOME | End: 2020-01-01
Attending: UROLOGY | Admitting: UROLOGY
Payer: MEDICARE

## 2020-01-01 VITALS
WEIGHT: 158.5 LBS | HEART RATE: 76 BPM | DIASTOLIC BLOOD PRESSURE: 72 MMHG | OXYGEN SATURATION: 98 % | BODY MASS INDEX: 24.02 KG/M2 | TEMPERATURE: 99 F | HEIGHT: 68 IN | SYSTOLIC BLOOD PRESSURE: 144 MMHG | RESPIRATION RATE: 18 BRPM

## 2020-01-01 VITALS
BODY MASS INDEX: 27.28 KG/M2 | DIASTOLIC BLOOD PRESSURE: 82 MMHG | SYSTOLIC BLOOD PRESSURE: 140 MMHG | WEIGHT: 180 LBS | HEIGHT: 68 IN | HEART RATE: 68 BPM

## 2020-01-01 VITALS
RESPIRATION RATE: 17 BRPM | OXYGEN SATURATION: 98 % | DIASTOLIC BLOOD PRESSURE: 64 MMHG | BODY MASS INDEX: 27 KG/M2 | HEART RATE: 67 BPM | HEIGHT: 68 IN | SYSTOLIC BLOOD PRESSURE: 126 MMHG | TEMPERATURE: 98 F

## 2020-01-01 VITALS
BODY MASS INDEX: 27 KG/M2 | OXYGEN SATURATION: 99 % | HEIGHT: 68 IN | HEART RATE: 66 BPM | RESPIRATION RATE: 18 BRPM | TEMPERATURE: 97 F | SYSTOLIC BLOOD PRESSURE: 132 MMHG | DIASTOLIC BLOOD PRESSURE: 76 MMHG

## 2020-01-01 VITALS
HEIGHT: 68 IN | OXYGEN SATURATION: 97 % | DIASTOLIC BLOOD PRESSURE: 78 MMHG | TEMPERATURE: 99 F | RESPIRATION RATE: 17 BRPM | HEART RATE: 63 BPM | SYSTOLIC BLOOD PRESSURE: 128 MMHG | BODY MASS INDEX: 27 KG/M2

## 2020-01-01 VITALS
TEMPERATURE: 97 F | HEIGHT: 68 IN | OXYGEN SATURATION: 98 % | BODY MASS INDEX: 27 KG/M2 | DIASTOLIC BLOOD PRESSURE: 68 MMHG | HEART RATE: 67 BPM | RESPIRATION RATE: 17 BRPM | SYSTOLIC BLOOD PRESSURE: 128 MMHG

## 2020-01-01 VITALS — SYSTOLIC BLOOD PRESSURE: 175 MMHG | HEART RATE: 66 BPM | DIASTOLIC BLOOD PRESSURE: 69 MMHG

## 2020-01-01 VITALS
RESPIRATION RATE: 16 BRPM | SYSTOLIC BLOOD PRESSURE: 161 MMHG | TEMPERATURE: 98 F | HEART RATE: 80 BPM | BODY MASS INDEX: 27 KG/M2 | WEIGHT: 180 LBS | DIASTOLIC BLOOD PRESSURE: 81 MMHG | OXYGEN SATURATION: 97 %

## 2020-01-01 VITALS
HEIGHT: 71 IN | TEMPERATURE: 97 F | OXYGEN SATURATION: 98 % | HEART RATE: 69 BPM | DIASTOLIC BLOOD PRESSURE: 82 MMHG | RESPIRATION RATE: 18 BRPM | WEIGHT: 180 LBS | BODY MASS INDEX: 25.2 KG/M2 | SYSTOLIC BLOOD PRESSURE: 153 MMHG

## 2020-01-01 VITALS
OXYGEN SATURATION: 98 % | SYSTOLIC BLOOD PRESSURE: 143 MMHG | DIASTOLIC BLOOD PRESSURE: 79 MMHG | TEMPERATURE: 99 F | HEART RATE: 69 BPM

## 2020-01-01 VITALS
DIASTOLIC BLOOD PRESSURE: 83 MMHG | HEART RATE: 59 BPM | RESPIRATION RATE: 17 BRPM | HEIGHT: 68 IN | TEMPERATURE: 98 F | OXYGEN SATURATION: 98 % | WEIGHT: 180 LBS | BODY MASS INDEX: 27.28 KG/M2 | SYSTOLIC BLOOD PRESSURE: 143 MMHG

## 2020-01-01 VITALS
WEIGHT: 180 LBS | BODY MASS INDEX: 27 KG/M2 | DIASTOLIC BLOOD PRESSURE: 88 MMHG | HEART RATE: 57 BPM | SYSTOLIC BLOOD PRESSURE: 155 MMHG

## 2020-01-01 VITALS
RESPIRATION RATE: 18 BRPM | TEMPERATURE: 98 F | WEIGHT: 180 LBS | SYSTOLIC BLOOD PRESSURE: 154 MMHG | HEART RATE: 75 BPM | BODY MASS INDEX: 25.77 KG/M2 | OXYGEN SATURATION: 96 % | DIASTOLIC BLOOD PRESSURE: 92 MMHG | HEIGHT: 70 IN

## 2020-01-01 DIAGNOSIS — G20 PARKINSON DISEASE (HCC): ICD-10-CM

## 2020-01-01 DIAGNOSIS — R26.2 DIFFICULTY WALKING: ICD-10-CM

## 2020-01-01 DIAGNOSIS — R60.0 LOCALIZED EDEMA: Primary | ICD-10-CM

## 2020-01-01 DIAGNOSIS — Z00.00 MEDICARE ANNUAL WELLNESS VISIT, SUBSEQUENT: ICD-10-CM

## 2020-01-01 DIAGNOSIS — Z01.818 PRE-OP EXAM: Primary | ICD-10-CM

## 2020-01-01 DIAGNOSIS — N39.0 URINARY TRACT INFECTION ASSOCIATED WITH CATHETERIZATION OF URINARY TRACT, UNSPECIFIED INDWELLING URINARY CATHETER TYPE, INITIAL ENCOUNTER (HCC): Primary | ICD-10-CM

## 2020-01-01 DIAGNOSIS — N31.9 NEUROGENIC BLADDER: ICD-10-CM

## 2020-01-01 DIAGNOSIS — N39.0 RECURRENT UTI: ICD-10-CM

## 2020-01-01 DIAGNOSIS — R26.89 BALANCE PROBLEM: ICD-10-CM

## 2020-01-01 DIAGNOSIS — I10 ESSENTIAL HYPERTENSION: ICD-10-CM

## 2020-01-01 DIAGNOSIS — M25.661 KNEE JOINT STIFFNESS, BILATERAL: ICD-10-CM

## 2020-01-01 DIAGNOSIS — Z13.31 DEPRESSION SCREENING: ICD-10-CM

## 2020-01-01 DIAGNOSIS — M62.81 NEUROLOGICAL MUSCLE WEAKNESS: ICD-10-CM

## 2020-01-01 DIAGNOSIS — N31.9 NEUROGENIC BLADDER: Primary | ICD-10-CM

## 2020-01-01 DIAGNOSIS — R33.9 URINARY RETENTION: Primary | ICD-10-CM

## 2020-01-01 DIAGNOSIS — R60.0 BILATERAL LEG EDEMA: Primary | ICD-10-CM

## 2020-01-01 DIAGNOSIS — R33.9 URINARY RETENTION: ICD-10-CM

## 2020-01-01 DIAGNOSIS — Z87.448 HISTORY OF HEMATURIA: ICD-10-CM

## 2020-01-01 DIAGNOSIS — R82.90 CLOUDY URINE: Primary | ICD-10-CM

## 2020-01-01 DIAGNOSIS — Z01.818 PRE-OP TESTING: ICD-10-CM

## 2020-01-01 DIAGNOSIS — Q54.1 PENILE HYPOSPADIAS: ICD-10-CM

## 2020-01-01 DIAGNOSIS — Z86.59: ICD-10-CM

## 2020-01-01 DIAGNOSIS — M79.89 SWELLING OF LOWER EXTREMITY: Primary | ICD-10-CM

## 2020-01-01 DIAGNOSIS — H25.13 AGE-RELATED NUCLEAR CATARACT OF BOTH EYES: ICD-10-CM

## 2020-01-01 DIAGNOSIS — R60.9 EDEMA, UNSPECIFIED TYPE: ICD-10-CM

## 2020-01-01 DIAGNOSIS — Z00.00 ENCOUNTER FOR ANNUAL HEALTH EXAMINATION: ICD-10-CM

## 2020-01-01 DIAGNOSIS — R53.1 GENERALIZED WEAKNESS: ICD-10-CM

## 2020-01-01 DIAGNOSIS — N28.1 BILATERAL RENAL CYSTS: ICD-10-CM
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
## 2020-01-01 DIAGNOSIS — M25.662 KNEE JOINT STIFFNESS, BILATERAL: ICD-10-CM

## 2020-01-01 DIAGNOSIS — R65.20 SEVERE SEPSIS (HCC): Primary | ICD-10-CM

## 2020-01-01 DIAGNOSIS — K59.00 CONSTIPATION, UNSPECIFIED CONSTIPATION TYPE: ICD-10-CM

## 2020-01-01 DIAGNOSIS — Z87.448 HISTORY OF HYDRONEPHROSIS: ICD-10-CM

## 2020-01-01 DIAGNOSIS — R26.89 BALANCE PROBLEMS: ICD-10-CM

## 2020-01-01 DIAGNOSIS — R60.0 LEG EDEMA, RIGHT: Primary | ICD-10-CM

## 2020-01-01 DIAGNOSIS — Z46.6 ENCOUNTER FOR FOLEY CATHETER REPLACEMENT: Primary | ICD-10-CM

## 2020-01-01 DIAGNOSIS — R35.1 BENIGN PROSTATIC HYPERPLASIA WITH NOCTURIA: ICD-10-CM

## 2020-01-01 DIAGNOSIS — M80.08XA AGE-RELATED OSTEOPOROSIS WITH CURRENT PATHOL FRACTURE OF VERTEBRA, INITIAL ENCOUNTER (HCC): ICD-10-CM

## 2020-01-01 DIAGNOSIS — N40.1 BENIGN PROSTATIC HYPERPLASIA WITH URINARY OBSTRUCTION: ICD-10-CM

## 2020-01-01 DIAGNOSIS — G12.29 PSEUDOBULBAR PALSY (HCC): ICD-10-CM

## 2020-01-01 DIAGNOSIS — R26.9 ABNORMAL GAIT: ICD-10-CM

## 2020-01-01 DIAGNOSIS — I42.9 CARDIOMYOPATHY, UNSPECIFIED TYPE (HCC): ICD-10-CM

## 2020-01-01 DIAGNOSIS — G20 PARKINSONISM, UNSPECIFIED PARKINSONISM TYPE (HCC): Primary | ICD-10-CM

## 2020-01-01 DIAGNOSIS — Z97.8 FOLEY CATHETER IN PLACE: ICD-10-CM

## 2020-01-01 DIAGNOSIS — T83.511A URINARY TRACT INFECTION ASSOCIATED WITH CATHETERIZATION OF URINARY TRACT, UNSPECIFIED INDWELLING URINARY CATHETER TYPE, INITIAL ENCOUNTER (HCC): Primary | ICD-10-CM

## 2020-01-01 DIAGNOSIS — Z13.6 SCREENING FOR CARDIOVASCULAR CONDITION: ICD-10-CM

## 2020-01-01 DIAGNOSIS — S50.01XA CONTUSION OF RIGHT ELBOW, INITIAL ENCOUNTER: Primary | ICD-10-CM

## 2020-01-01 DIAGNOSIS — N30.00 ACUTE CYSTITIS WITHOUT HEMATURIA: Primary | ICD-10-CM

## 2020-01-01 DIAGNOSIS — N40.1 BENIGN PROSTATIC HYPERPLASIA WITH NOCTURIA: ICD-10-CM

## 2020-01-01 DIAGNOSIS — N13.8 BENIGN PROSTATIC HYPERPLASIA WITH URINARY OBSTRUCTION: ICD-10-CM

## 2020-01-01 DIAGNOSIS — Z01.818 PRE-OP TESTING: Primary | ICD-10-CM

## 2020-01-01 DIAGNOSIS — S70.01XA CONTUSION OF RIGHT HIP, INITIAL ENCOUNTER: ICD-10-CM

## 2020-01-01 DIAGNOSIS — T88.7XXA MEDICATION SIDE EFFECT: ICD-10-CM

## 2020-01-01 DIAGNOSIS — R60.0 EDEMA OF BOTH FEET: Primary | ICD-10-CM

## 2020-01-01 DIAGNOSIS — I10 ESSENTIAL HYPERTENSION: Primary | ICD-10-CM

## 2020-01-01 DIAGNOSIS — A41.9 SEVERE SEPSIS (HCC): Primary | ICD-10-CM

## 2020-01-01 DIAGNOSIS — N48.89 PENILE EROSION: ICD-10-CM

## 2020-01-01 DIAGNOSIS — Z00.00 MEDICARE ANNUAL WELLNESS VISIT, SUBSEQUENT: Primary | ICD-10-CM

## 2020-01-01 DIAGNOSIS — N28.9 ACUTE RENAL INSUFFICIENCY: ICD-10-CM

## 2020-01-01 LAB
ANION GAP SERPL CALC-SCNC: 8 MMOL/L (ref 0–18)
ANION GAP SERPL CALC-SCNC: 9 MMOL/L (ref 0–18)
BASOPHILS # BLD AUTO: 0.05 X10(3) UL (ref 0–0.2)
BASOPHILS NFR BLD AUTO: 0.6 %
BILIRUB UR QL: NEGATIVE
BUN BLD-MCNC: 16 MG/DL (ref 7–18)
BUN BLD-MCNC: 21 MG/DL (ref 7–18)
BUN/CREAT SERPL: 13 (ref 10–20)
BUN/CREAT SERPL: 17.4 (ref 10–20)
CALCIUM BLD-MCNC: 9 MG/DL (ref 8.5–10.1)
CALCIUM BLD-MCNC: 9.6 MG/DL (ref 8.5–10.1)
CHLORIDE SERPL-SCNC: 103 MMOL/L (ref 98–112)
CHLORIDE SERPL-SCNC: 106 MMOL/L (ref 98–112)
CO2 SERPL-SCNC: 25 MMOL/L (ref 21–32)
CO2 SERPL-SCNC: 29 MMOL/L (ref 21–32)
COLOR UR: YELLOW
CREAT BLD-MCNC: 1.21 MG/DL (ref 0.7–1.3)
CREAT BLD-MCNC: 1.23 MG/DL (ref 0.7–1.3)
DEPRECATED RDW RBC AUTO: 42.5 FL (ref 35.1–46.3)
EOSINOPHIL # BLD AUTO: 0.06 X10(3) UL (ref 0–0.7)
EOSINOPHIL NFR BLD AUTO: 0.7 %
ERYTHROCYTE [DISTWIDTH] IN BLOOD BY AUTOMATED COUNT: 13.2 % (ref 11–15)
GLUCOSE BLD-MCNC: 106 MG/DL (ref 70–99)
GLUCOSE BLD-MCNC: 97 MG/DL (ref 70–99)
GLUCOSE UR-MCNC: NEGATIVE MG/DL
HCT VFR BLD AUTO: 45.5 % (ref 39–53)
HGB BLD-MCNC: 15.1 G/DL (ref 13–17.5)
HGB UR QL STRIP.AUTO: NEGATIVE
IMM GRANULOCYTES # BLD AUTO: 0.04 X10(3) UL (ref 0–1)
IMM GRANULOCYTES NFR BLD: 0.5 %
KETONES UR-MCNC: NEGATIVE MG/DL
LYMPHOCYTES # BLD AUTO: 1.27 X10(3) UL (ref 1–4)
LYMPHOCYTES NFR BLD AUTO: 14.7 %
MCH RBC QN AUTO: 29.2 PG (ref 26–34)
MCHC RBC AUTO-ENTMCNC: 33.2 G/DL (ref 31–37)
MCV RBC AUTO: 88 FL (ref 80–100)
MONOCYTES # BLD AUTO: 0.9 X10(3) UL (ref 0.1–1)
MONOCYTES NFR BLD AUTO: 10.4 %
NEUTROPHILS # BLD AUTO: 6.31 X10 (3) UL (ref 1.5–7.7)
NEUTROPHILS # BLD AUTO: 6.31 X10(3) UL (ref 1.5–7.7)
NEUTROPHILS NFR BLD AUTO: 73.1 %
NITRITE UR QL STRIP.AUTO: POSITIVE
OSMOLALITY SERPL CALC.SUM OF ELEC: 292 MOSM/KG (ref 275–295)
OSMOLALITY SERPL CALC.SUM OF ELEC: 293 MOSM/KG (ref 275–295)
PATIENT FASTING Y/N/NP: YES
PH UR: 6 [PH] (ref 5–8)
PLATELET # BLD AUTO: 248 10(3)UL (ref 150–450)
POTASSIUM SERPL-SCNC: 3.9 MMOL/L (ref 3.5–5.1)
POTASSIUM SERPL-SCNC: 4 MMOL/L (ref 3.5–5.1)
PROT UR-MCNC: NEGATIVE MG/DL
RBC # BLD AUTO: 5.17 X10(6)UL (ref 3.8–5.8)
RBC #/AREA URNS AUTO: 2 /HPF
SARS-COV-2 RNA RESP QL NAA+PROBE: NOT DETECTED
SODIUM SERPL-SCNC: 140 MMOL/L (ref 136–145)
SODIUM SERPL-SCNC: 140 MMOL/L (ref 136–145)
SP GR UR STRIP: 1.01 (ref 1–1.03)
UROBILINOGEN UR STRIP-ACNC: <2
WBC # BLD AUTO: 8.6 X10(3) UL (ref 4–11)
WBC #/AREA URNS AUTO: 82 /HPF

## 2020-01-01 PROCEDURE — G0463 HOSPITAL OUTPT CLINIC VISIT: HCPCS | Performed by: UROLOGY

## 2020-01-01 PROCEDURE — 36415 COLL VENOUS BLD VENIPUNCTURE: CPT

## 2020-01-01 PROCEDURE — 51702 INSERT TEMP BLADDER CATH: CPT | Performed by: UROLOGY

## 2020-01-01 PROCEDURE — 73502 X-RAY EXAM HIP UNI 2-3 VIEWS: CPT | Performed by: EMERGENCY MEDICINE

## 2020-01-01 PROCEDURE — 81001 URINALYSIS AUTO W/SCOPE: CPT | Performed by: EMERGENCY MEDICINE

## 2020-01-01 PROCEDURE — 71045 X-RAY EXAM CHEST 1 VIEW: CPT | Performed by: INTERNAL MEDICINE

## 2020-01-01 PROCEDURE — 99213 OFFICE O/P EST LOW 20 MIN: CPT | Performed by: INTERNAL MEDICINE

## 2020-01-01 PROCEDURE — 99232 SBSQ HOSP IP/OBS MODERATE 35: CPT | Performed by: INTERNAL MEDICINE

## 2020-01-01 PROCEDURE — 81001 URINALYSIS AUTO W/SCOPE: CPT

## 2020-01-01 PROCEDURE — 93005 ELECTROCARDIOGRAM TRACING: CPT | Performed by: NURSE PRACTITIONER

## 2020-01-01 PROCEDURE — 99221 1ST HOSP IP/OBS SF/LOW 40: CPT | Performed by: NURSE PRACTITIONER

## 2020-01-01 PROCEDURE — 51703 INSERT BLADDER CATH COMPLEX: CPT | Performed by: UROLOGY

## 2020-01-01 PROCEDURE — 99233 SBSQ HOSP IP/OBS HIGH 50: CPT | Performed by: INTERNAL MEDICINE

## 2020-01-01 PROCEDURE — 99441 PHONE E/M BY PHYS 5-10 MIN: CPT | Performed by: OTHER

## 2020-01-01 PROCEDURE — 99231 SBSQ HOSP IP/OBS SF/LOW 25: CPT | Performed by: NURSE PRACTITIONER

## 2020-01-01 PROCEDURE — 93010 ELECTROCARDIOGRAM REPORT: CPT | Performed by: NURSE PRACTITIONER

## 2020-01-01 PROCEDURE — 93306 TTE W/DOPPLER COMPLETE: CPT | Performed by: PHYSICIAN ASSISTANT

## 2020-01-01 PROCEDURE — G0444 DEPRESSION SCREEN ANNUAL: HCPCS | Performed by: INTERNAL MEDICINE

## 2020-01-01 PROCEDURE — 80061 LIPID PANEL: CPT

## 2020-01-01 PROCEDURE — 71045 X-RAY EXAM CHEST 1 VIEW: CPT | Performed by: EMERGENCY MEDICINE

## 2020-01-01 PROCEDURE — G0439 PPPS, SUBSEQ VISIT: HCPCS | Performed by: INTERNAL MEDICINE

## 2020-01-01 PROCEDURE — 99284 EMERGENCY DEPT VISIT MOD MDM: CPT

## 2020-01-01 PROCEDURE — 0T9B30Z DRAINAGE OF BLADDER WITH DRAINAGE DEVICE, PERCUTANEOUS APPROACH: ICD-10-PCS | Performed by: UROLOGY

## 2020-01-01 PROCEDURE — 85025 COMPLETE CBC W/AUTO DIFF WBC: CPT

## 2020-01-01 PROCEDURE — 99233 SBSQ HOSP IP/OBS HIGH 50: CPT | Performed by: UROLOGY

## 2020-01-01 PROCEDURE — 80048 BASIC METABOLIC PNL TOTAL CA: CPT

## 2020-01-01 PROCEDURE — 99214 OFFICE O/P EST MOD 30 MIN: CPT | Performed by: INTERNAL MEDICINE

## 2020-01-01 PROCEDURE — 80048 BASIC METABOLIC PNL TOTAL CA: CPT | Performed by: EMERGENCY MEDICINE

## 2020-01-01 PROCEDURE — 74176 CT ABD & PELVIS W/O CONTRAST: CPT | Performed by: EMERGENCY MEDICINE

## 2020-01-01 PROCEDURE — 51705 CHANGE OF BLADDER TUBE: CPT | Performed by: UROLOGY

## 2020-01-01 PROCEDURE — G0463 HOSPITAL OUTPT CLINIC VISIT: HCPCS | Performed by: NURSE PRACTITIONER

## 2020-01-01 PROCEDURE — 36415 COLL VENOUS BLD VENIPUNCTURE: CPT | Performed by: INTERNAL MEDICINE

## 2020-01-01 PROCEDURE — 80053 COMPREHEN METABOLIC PANEL: CPT

## 2020-01-01 PROCEDURE — 52310 CYSTOSCOPY AND TREATMENT: CPT | Performed by: UROLOGY

## 2020-01-01 PROCEDURE — 76775 US EXAM ABDO BACK WALL LIM: CPT | Performed by: UROLOGY

## 2020-01-01 PROCEDURE — 99215 OFFICE O/P EST HI 40 MIN: CPT | Performed by: UROLOGY

## 2020-01-01 PROCEDURE — G0180 MD CERTIFICATION HHA PATIENT: HCPCS | Performed by: INTERNAL MEDICINE

## 2020-01-01 PROCEDURE — 85025 COMPLETE CBC W/AUTO DIFF WBC: CPT | Performed by: EMERGENCY MEDICINE

## 2020-01-01 PROCEDURE — 87086 URINE CULTURE/COLONY COUNT: CPT | Performed by: EMERGENCY MEDICINE

## 2020-01-01 PROCEDURE — 51702 INSERT TEMP BLADDER CATH: CPT

## 2020-01-01 PROCEDURE — 93971 EXTREMITY STUDY: CPT

## 2020-01-01 PROCEDURE — G2012 BRIEF CHECK IN BY MD/QHP: HCPCS | Performed by: INTERNAL MEDICINE

## 2020-01-01 PROCEDURE — 99223 1ST HOSP IP/OBS HIGH 75: CPT | Performed by: UROLOGY

## 2020-01-01 PROCEDURE — 51700 IRRIGATION OF BLADDER: CPT | Performed by: UROLOGY

## 2020-01-01 PROCEDURE — 0TCB8ZZ EXTIRPATION OF MATTER FROM BLADDER, VIA NATURAL OR ARTIFICIAL OPENING ENDOSCOPIC: ICD-10-PCS | Performed by: UROLOGY

## 2020-01-01 PROCEDURE — 99443 PHONE E/M BY PHYS 21-30 MIN: CPT | Performed by: INTERNAL MEDICINE

## 2020-01-01 PROCEDURE — 99223 1ST HOSP IP/OBS HIGH 75: CPT | Performed by: INTERNAL MEDICINE

## 2020-01-01 PROCEDURE — 99213 OFFICE O/P EST LOW 20 MIN: CPT | Performed by: NURSE PRACTITIONER

## 2020-01-01 PROCEDURE — 99239 HOSP IP/OBS DSCHRG MGMT >30: CPT | Performed by: INTERNAL MEDICINE

## 2020-01-01 PROCEDURE — 73080 X-RAY EXAM OF ELBOW: CPT | Performed by: EMERGENCY MEDICINE

## 2020-01-01 PROCEDURE — 51102 DRAIN BL W/CATH INSERTION: CPT | Performed by: UROLOGY

## 2020-01-01 PROCEDURE — 99214 OFFICE O/P EST MOD 30 MIN: CPT | Performed by: NURSE PRACTITIONER

## 2020-01-01 PROCEDURE — 0T2BX0Z CHANGE DRAINAGE DEVICE IN BLADDER, EXTERNAL APPROACH: ICD-10-PCS | Performed by: EMERGENCY MEDICINE

## 2020-01-01 RX ORDER — SODIUM CHLORIDE, SODIUM LACTATE, POTASSIUM CHLORIDE, CALCIUM CHLORIDE 600; 310; 30; 20 MG/100ML; MG/100ML; MG/100ML; MG/100ML
INJECTION, SOLUTION INTRAVENOUS CONTINUOUS PRN
Status: DISCONTINUED | OUTPATIENT
Start: 2020-01-01 | End: 2020-01-01

## 2020-01-01 RX ORDER — POTASSIUM CHLORIDE 1.5 G/1.77G
40 POWDER, FOR SOLUTION ORAL ONCE
Status: COMPLETED | OUTPATIENT
Start: 2020-01-01 | End: 2020-01-01

## 2020-01-01 RX ORDER — MORPHINE SULFATE 10 MG/ML
6 INJECTION, SOLUTION INTRAMUSCULAR; INTRAVENOUS EVERY 10 MIN PRN
Status: DISCONTINUED | OUTPATIENT
Start: 2020-01-01 | End: 2020-01-01

## 2020-01-01 RX ORDER — FUROSEMIDE 20 MG/1
20 TABLET ORAL DAILY
Qty: 90 TABLET | Refills: 0 | Status: SHIPPED | OUTPATIENT
Start: 2020-01-01

## 2020-01-01 RX ORDER — FUROSEMIDE 20 MG/1
TABLET ORAL
Qty: 90 TABLET | Refills: 1 | Status: ON HOLD | OUTPATIENT
Start: 2020-01-01 | End: 2020-01-01

## 2020-01-01 RX ORDER — ACETAMINOPHEN 325 MG/1
650 TABLET ORAL ONCE
Status: COMPLETED | OUTPATIENT
Start: 2020-01-01 | End: 2020-01-01

## 2020-01-01 RX ORDER — SODIUM CHLORIDE 9 MG/ML
INJECTION, SOLUTION INTRAVENOUS ONCE
Status: COMPLETED | OUTPATIENT
Start: 2020-01-01 | End: 2020-01-01

## 2020-01-01 RX ORDER — ONDANSETRON 2 MG/ML
4 INJECTION INTRAMUSCULAR; INTRAVENOUS ONCE AS NEEDED
Status: DISCONTINUED | OUTPATIENT
Start: 2020-01-01 | End: 2020-01-01

## 2020-01-01 RX ORDER — ACETAMINOPHEN 10 MG/ML
1000 INJECTION, SOLUTION INTRAVENOUS EVERY 8 HOURS PRN
Status: DISCONTINUED | OUTPATIENT
Start: 2020-01-01 | End: 2020-01-01

## 2020-01-01 RX ORDER — FINASTERIDE 5 MG/1
5 TABLET, FILM COATED ORAL DAILY
Status: DISCONTINUED | OUTPATIENT
Start: 2020-01-01 | End: 2020-01-01

## 2020-01-01 RX ORDER — VANCOMYCIN HYDROCHLORIDE
1500 EVERY 24 HOURS
Status: DISCONTINUED | OUTPATIENT
Start: 2020-01-01 | End: 2020-01-01

## 2020-01-01 RX ORDER — POTASSIUM CHLORIDE 20 MEQ/1
40 TABLET, EXTENDED RELEASE ORAL EVERY 4 HOURS
Status: COMPLETED | OUTPATIENT
Start: 2020-01-01 | End: 2020-01-01

## 2020-01-01 RX ORDER — SODIUM CHLORIDE 9 MG/ML
INJECTION, SOLUTION INTRAVENOUS CONTINUOUS
Status: DISCONTINUED | OUTPATIENT
Start: 2020-01-01 | End: 2020-01-01

## 2020-01-01 RX ORDER — SODIUM CHLORIDE 450 MG/100ML
INJECTION, SOLUTION INTRAVENOUS CONTINUOUS
Status: DISCONTINUED | OUTPATIENT
Start: 2020-01-01 | End: 2020-01-01

## 2020-01-01 RX ORDER — ACETAMINOPHEN 500 MG
1000 TABLET ORAL ONCE
Status: DISCONTINUED | OUTPATIENT
Start: 2020-01-01 | End: 2020-01-01 | Stop reason: HOSPADM

## 2020-01-01 RX ORDER — SODIUM CHLORIDE, SODIUM LACTATE, POTASSIUM CHLORIDE, CALCIUM CHLORIDE 600; 310; 30; 20 MG/100ML; MG/100ML; MG/100ML; MG/100ML
INJECTION, SOLUTION INTRAVENOUS CONTINUOUS
Status: CANCELLED | OUTPATIENT
Start: 2020-01-01

## 2020-01-01 RX ORDER — HYDROMORPHONE HYDROCHLORIDE 1 MG/ML
0.2 INJECTION, SOLUTION INTRAMUSCULAR; INTRAVENOUS; SUBCUTANEOUS EVERY 5 MIN PRN
Status: DISCONTINUED | OUTPATIENT
Start: 2020-01-01 | End: 2020-01-01

## 2020-01-01 RX ORDER — SODIUM CHLORIDE, SODIUM LACTATE, POTASSIUM CHLORIDE, CALCIUM CHLORIDE 600; 310; 30; 20 MG/100ML; MG/100ML; MG/100ML; MG/100ML
INJECTION, SOLUTION INTRAVENOUS CONTINUOUS
Status: DISCONTINUED | OUTPATIENT
Start: 2020-01-01 | End: 2020-01-01

## 2020-01-01 RX ORDER — MORPHINE SULFATE 4 MG/ML
4 INJECTION, SOLUTION INTRAMUSCULAR; INTRAVENOUS EVERY 10 MIN PRN
Status: DISCONTINUED | OUTPATIENT
Start: 2020-01-01 | End: 2020-01-01

## 2020-01-01 RX ORDER — LACTULOSE 10 G/15ML
20 SOLUTION ORAL DAILY PRN
Status: DISCONTINUED | OUTPATIENT
Start: 2020-01-01 | End: 2020-01-01

## 2020-01-01 RX ORDER — METOCLOPRAMIDE 10 MG/1
10 TABLET ORAL ONCE
Status: DISCONTINUED | OUTPATIENT
Start: 2020-01-01 | End: 2020-01-01 | Stop reason: HOSPADM

## 2020-01-01 RX ORDER — SERTRALINE HYDROCHLORIDE 25 MG/1
25 TABLET, FILM COATED ORAL DAILY
COMMUNITY
End: 2020-01-01

## 2020-01-01 RX ORDER — PROCHLORPERAZINE EDISYLATE 5 MG/ML
5 INJECTION INTRAMUSCULAR; INTRAVENOUS ONCE AS NEEDED
Status: DISCONTINUED | OUTPATIENT
Start: 2020-01-01 | End: 2020-01-01

## 2020-01-01 RX ORDER — CLOTRIMAZOLE AND BETAMETHASONE DIPROPIONATE 10; .64 MG/G; MG/G
CREAM TOPICAL
Qty: 45 G | Refills: 1 | OUTPATIENT
Start: 2020-01-01

## 2020-01-01 RX ORDER — HYDROCODONE BITARTRATE AND ACETAMINOPHEN 5; 325 MG/1; MG/1
2 TABLET ORAL AS NEEDED
Status: DISCONTINUED | OUTPATIENT
Start: 2020-01-01 | End: 2020-01-01

## 2020-01-01 RX ORDER — ALBUTEROL SULFATE 90 UG/1
1 AEROSOL, METERED RESPIRATORY (INHALATION) 4 TIMES DAILY PRN
Status: DISCONTINUED | OUTPATIENT
Start: 2020-01-01 | End: 2020-01-01

## 2020-01-01 RX ORDER — MORPHINE SULFATE 4 MG/ML
2 INJECTION, SOLUTION INTRAMUSCULAR; INTRAVENOUS EVERY 10 MIN PRN
Status: DISCONTINUED | OUTPATIENT
Start: 2020-01-01 | End: 2020-01-01

## 2020-01-01 RX ORDER — CLOTRIMAZOLE AND BETAMETHASONE DIPROPIONATE 10; .64 MG/G; MG/G
CREAM TOPICAL 2 TIMES DAILY PRN
Status: DISCONTINUED | OUTPATIENT
Start: 2020-01-01 | End: 2020-01-01

## 2020-01-01 RX ORDER — SULFAMETHOXAZOLE AND TRIMETHOPRIM 800; 160 MG/1; MG/1
1 TABLET ORAL 2 TIMES DAILY
Qty: 28 TABLET | Refills: 0 | Status: SHIPPED | OUTPATIENT
Start: 2020-01-01 | End: 2020-01-01

## 2020-01-01 RX ORDER — ACETAMINOPHEN 325 MG/1
650 TABLET ORAL EVERY 6 HOURS PRN
Status: DISCONTINUED | OUTPATIENT
Start: 2020-01-01 | End: 2020-01-01

## 2020-01-01 RX ORDER — HYDROMORPHONE HYDROCHLORIDE 1 MG/ML
0.6 INJECTION, SOLUTION INTRAMUSCULAR; INTRAVENOUS; SUBCUTANEOUS EVERY 5 MIN PRN
Status: DISCONTINUED | OUTPATIENT
Start: 2020-01-01 | End: 2020-01-01

## 2020-01-01 RX ORDER — AMLODIPINE BESYLATE 5 MG/1
5 TABLET ORAL
COMMUNITY

## 2020-01-01 RX ORDER — HYDROCHLOROTHIAZIDE 25 MG/1
12.5 TABLET ORAL DAILY
Qty: 30 TABLET | Refills: 2 | Status: SHIPPED | OUTPATIENT
Start: 2020-01-01 | End: 2020-01-01

## 2020-01-01 RX ORDER — ONDANSETRON 2 MG/ML
INJECTION INTRAMUSCULAR; INTRAVENOUS AS NEEDED
Status: DISCONTINUED | OUTPATIENT
Start: 2020-01-01 | End: 2020-01-01 | Stop reason: SURG

## 2020-01-01 RX ORDER — LIDOCAINE HYDROCHLORIDE 10 MG/ML
INJECTION, SOLUTION EPIDURAL; INFILTRATION; INTRACAUDAL; PERINEURAL AS NEEDED
Status: DISCONTINUED | OUTPATIENT
Start: 2020-01-01 | End: 2020-01-01 | Stop reason: SURG

## 2020-01-01 RX ORDER — SULFAMETHOXAZOLE AND TRIMETHOPRIM 800; 160 MG/1; MG/1
1 TABLET ORAL ONCE
Status: COMPLETED | OUTPATIENT
Start: 2020-01-01 | End: 2020-01-01

## 2020-01-01 RX ORDER — DEXTROSE MONOHYDRATE AND SODIUM CHLORIDE 5; .225 G/100ML; G/100ML
INJECTION, SOLUTION INTRAVENOUS CONTINUOUS
Status: DISCONTINUED | OUTPATIENT
Start: 2020-01-01 | End: 2020-01-01

## 2020-01-01 RX ORDER — AMLODIPINE BESYLATE 5 MG/1
5 TABLET ORAL
Status: DISCONTINUED | OUTPATIENT
Start: 2020-01-01 | End: 2020-01-01

## 2020-01-01 RX ORDER — FUROSEMIDE 20 MG/1
20 TABLET ORAL DAILY
Qty: 30 TABLET | Refills: 0 | Status: SHIPPED | OUTPATIENT
Start: 2020-01-01 | End: 2020-01-01

## 2020-01-01 RX ORDER — HYDROCHLOROTHIAZIDE 12.5 MG/1
TABLET ORAL
Qty: 90 TABLET | Refills: 0 | OUTPATIENT
Start: 2020-01-01

## 2020-01-01 RX ORDER — HYDROMORPHONE HYDROCHLORIDE 1 MG/ML
0.4 INJECTION, SOLUTION INTRAMUSCULAR; INTRAVENOUS; SUBCUTANEOUS EVERY 5 MIN PRN
Status: DISCONTINUED | OUTPATIENT
Start: 2020-01-01 | End: 2020-01-01

## 2020-01-01 RX ORDER — DEXTROSE MONOHYDRATE 50 MG/ML
INJECTION, SOLUTION INTRAVENOUS CONTINUOUS
Status: DISCONTINUED | OUTPATIENT
Start: 2020-01-01 | End: 2020-01-01

## 2020-01-01 RX ORDER — POLYETHYLENE GLYCOL 3350 17 G/17G
17 POWDER, FOR SOLUTION ORAL 2 TIMES DAILY
Status: DISCONTINUED | OUTPATIENT
Start: 2020-01-01 | End: 2020-01-01

## 2020-01-01 RX ORDER — VANCOMYCIN/0.9 % SOD CHLORIDE 1.75 G/5
25 PLASTIC BAG, INJECTION (ML) INTRAVENOUS ONCE
Status: COMPLETED | OUTPATIENT
Start: 2020-01-01 | End: 2020-01-01

## 2020-01-01 RX ORDER — HALOPERIDOL 5 MG/ML
0.25 INJECTION INTRAMUSCULAR ONCE AS NEEDED
Status: DISCONTINUED | OUTPATIENT
Start: 2020-01-01 | End: 2020-01-01

## 2020-01-01 RX ORDER — EPHEDRINE SULFATE 50 MG/ML
INJECTION, SOLUTION INTRAVENOUS AS NEEDED
Status: DISCONTINUED | OUTPATIENT
Start: 2020-01-01 | End: 2020-01-01 | Stop reason: SURG

## 2020-01-01 RX ORDER — DEXAMETHASONE SODIUM PHOSPHATE 4 MG/ML
VIAL (ML) INJECTION AS NEEDED
Status: DISCONTINUED | OUTPATIENT
Start: 2020-01-01 | End: 2020-01-01 | Stop reason: SURG

## 2020-01-01 RX ORDER — CLOTRIMAZOLE AND BETAMETHASONE DIPROPIONATE 10; .64 MG/G; MG/G
CREAM TOPICAL
Qty: 45 G | Refills: 1 | Status: SHIPPED | OUTPATIENT
Start: 2020-01-01 | End: 2020-01-01

## 2020-01-01 RX ORDER — CIPROFLOXACIN 500 MG/1
500 TABLET, FILM COATED ORAL 2 TIMES DAILY
Qty: 14 TABLET | Refills: 0 | Status: SHIPPED | OUTPATIENT
Start: 2020-01-01 | End: 2020-01-01

## 2020-01-01 RX ORDER — HYDROCHLOROTHIAZIDE 12.5 MG/1
TABLET ORAL
Qty: 90 TABLET | Refills: 0 | Status: SHIPPED | OUTPATIENT
Start: 2020-01-01 | End: 2020-01-01

## 2020-01-01 RX ORDER — FINASTERIDE 5 MG/1
TABLET, FILM COATED ORAL
Qty: 90 TABLET | Refills: 3 | Status: SHIPPED | OUTPATIENT
Start: 2020-01-01

## 2020-01-01 RX ORDER — POTASSIUM CHLORIDE 20 MEQ/1
40 TABLET, EXTENDED RELEASE ORAL ONCE
Status: COMPLETED | OUTPATIENT
Start: 2020-01-01 | End: 2020-01-01

## 2020-01-01 RX ORDER — FAMOTIDINE 20 MG/1
20 TABLET ORAL ONCE
Status: DISCONTINUED | OUTPATIENT
Start: 2020-01-01 | End: 2020-01-01 | Stop reason: HOSPADM

## 2020-01-01 RX ORDER — HYDROCODONE BITARTRATE AND ACETAMINOPHEN 5; 325 MG/1; MG/1
1 TABLET ORAL AS NEEDED
Status: DISCONTINUED | OUTPATIENT
Start: 2020-01-01 | End: 2020-01-01

## 2020-01-01 RX ORDER — NALOXONE HYDROCHLORIDE 0.4 MG/ML
80 INJECTION, SOLUTION INTRAMUSCULAR; INTRAVENOUS; SUBCUTANEOUS AS NEEDED
Status: DISCONTINUED | OUTPATIENT
Start: 2020-01-01 | End: 2020-01-01

## 2020-01-01 RX ORDER — DEXTROSE AND SODIUM CHLORIDE 5; .45 G/100ML; G/100ML
INJECTION, SOLUTION INTRAVENOUS ONCE
Status: COMPLETED | OUTPATIENT
Start: 2020-01-01 | End: 2020-01-01

## 2020-01-01 RX ADMIN — ONDANSETRON 4 MG: 2 INJECTION INTRAMUSCULAR; INTRAVENOUS at 11:38:00

## 2020-01-01 RX ADMIN — SODIUM CHLORIDE: 9 INJECTION, SOLUTION INTRAVENOUS at 12:38:00

## 2020-01-01 RX ADMIN — SODIUM CHLORIDE: 9 INJECTION, SOLUTION INTRAVENOUS at 11:06:00

## 2020-01-01 RX ADMIN — EPHEDRINE SULFATE 10 MG: 50 INJECTION, SOLUTION INTRAVENOUS at 11:28:00

## 2020-01-01 RX ADMIN — LIDOCAINE HYDROCHLORIDE 50 MG: 10 INJECTION, SOLUTION EPIDURAL; INFILTRATION; INTRACAUDAL; PERINEURAL at 11:14:00

## 2020-01-01 RX ADMIN — DEXAMETHASONE SODIUM PHOSPHATE 4 MG: 4 MG/ML VIAL (ML) INJECTION at 11:28:00

## 2020-01-01 RX ADMIN — EPHEDRINE SULFATE 10 MG: 50 INJECTION, SOLUTION INTRAVENOUS at 11:25:00

## 2020-01-02 NOTE — TELEPHONE ENCOUNTER
Tracy Saxena from [facility] calling requesting copies of LOV notes and PT order faxed to office at [phone]. Above faxed.

## 2020-01-03 NOTE — PROGRESS NOTES
I called the pt & his spouse into the exam room and I introduced myself and verified the pt's name and . I explained that I will be removing his existing clark cath and replacing a new clark cath.  I then assisted the pt out of his W/C & onto the exam ta

## 2020-01-22 NOTE — PROGRESS NOTES
HPI:    Patient ID: Ernesto Silva is a 78year old male. HPI  Patient here for 3-month follow-up overall is doing okay denies any complaints.   Wife says that he had Holter monitor returned when he was given to him by [facility] health but he never heard back 1 Tube 3   • acetaminophen 325 MG Oral Tab Take 650 mg by mouth every 6 (six) hours as needed for Pain. • Cyanocobalamin (B-12) 500 MCG Oral Tab Take 1 tablet by mouth After dinner.        • Vitamin D3 1000 units Oral Tab Take 1,000 Units by mouth daily rhythm, normal heart sounds and intact distal pulses. Exam reveals no friction rub. No murmur heard. Pulmonary/Chest: Effort normal and breath sounds normal. No respiratory distress. He has no wheezes. He has no rales. Abdominal: Soft.  Bowel sounds ar

## 2020-02-05 NOTE — TELEPHONE ENCOUNTER
Dr. Sanjiv Paul has decided to go under general anesthesia for placement of an open suprapubic tube (through a small incision).   You last saw him in the office 7/23/2019 and note indicates that he is stable, \"no evidence of significant heart f

## 2020-02-05 NOTE — PROGRESS NOTES
HPI:    Patient ID: Heather Lopez is a 78year old male. HPI     History provided by patient and wife, Sahara Herman. Urinary Retention  Chronic.  Severe urinating problems since 2016 with urinary incontinence both day and night; patient wearing Depends since <10,000 cfu/ml pseudomonas aeruginosa; <10,000 cfu/ml Mixed gram positive danielle; resistant to ciprofloxacin and levofloxacin. He currently denies gross hematuria, fever, chills, flank pain, or dysuria currently.  He feels this problem is stable.     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 • valsartan 40 MG Oral Tab Take 40 mg by mouth daily.      • CARBIDOPA-LEVODOPA  MG Oral Tab TAKE TWO TABLETS BY MOUTH TWICE A  tablet 2   • HYDROCHLOROTHIAZIDE 12.5 MG Oral Tab TAKE ONE TABLET BY MOUT Diabetes Neg    • Glaucoma Neg    • Macular degeneration Neg       Social History: Social History    Tobacco Use      Smoking status: Former Smoker        Quit date: 1962        Years since quittin.4      Smokeless tobacco: Never Used    Alcohol crystal/Macrobid 100 mg twice daily x10 days)  07/05/2018 Urine Culture = 10-50,000 cfu/ml Multiple species present-probable contamination.    04/18/2018 PSA = 0.3; Creatinine = 1.17; eGFR = 59  12/11/2017 Creatinine = 1.53; eGFR = 44  12/07/2017 UA (from f balloon inflated to 10 cc placed in the StatLock in such a way so that there is slack in the tubing; new leg bag provided. No complications. Final decision to change Azul catheter made during the visit today.   Excluding that, I spent 40   minutes wit doctor that patient is planning to undergo surgery under general anesthesia for placement of a open suprapubic tube; I ask if patient should complete his follow-up and scheduled echo before or after the procedure; Dr. Romina Archuleta is to get back to me.       (N catheter; tamponade was achieved and bleeding stopped. The patient and his wife deny gross hematuria currently.  They feel this is stable and decide to observe.         I explained to patient the risks, side effects, and alternatives, and I answered questio personally performed the services described in this documentation. All medical record entries made by the scribe were at my direction and in my presence.   I have reviewed the chart and discharge instructions (if applicable) and agree that the record reflec

## 2020-02-05 NOTE — PATIENT INSTRUCTIONS
Kenia Mcmahon M.D.    1.  Azul catheter changed today. Schedule nurse visit 30  days from now in case  suprapubic tube insertion is not performed under general anesthesia during the next 30 days,    2.   Waiting response f

## 2020-02-05 NOTE — PROGRESS NOTES
Patient seen in office today, scheduled for   Cystoscopy with open insertion of suprapubic tube open procedure, Thursday 03/19/2020, [facility]/outpatient, went over pre-op instructions, patient has nurse visit scheduled for Monday 03/09/2020, Cindra Rubinstein

## 2020-02-09 NOTE — TELEPHONE ENCOUNTER
Rukhsana Carrion,  Please call patient's wife. Dr. Fiona Johnson send me electronic message that patient does not need special visit with him or any special new heart testing before proceeding with cystoscopy, insertion of SP tube under general anesthesia.   We are okay t

## 2020-02-10 NOTE — TELEPHONE ENCOUNTER
L/m on patient' v/m for Wife Shakir Larson, informing that per , patient okay to proceed with surgery.

## 2020-02-11 NOTE — TELEPHONE ENCOUNTER
Sinement 25/100mg Take 2 tablets BID.     Last filled-6/18/2019 #120 with 2 refills    LOV-11/19/2019  NOV-3/18/2020

## 2020-02-12 NOTE — TELEPHONE ENCOUNTER
Patient' wife Khadar uLcio called, asked that patient' surgery be moved Thursday 03/26/2020, was done surgery request was updated.

## 2020-02-19 NOTE — TELEPHONE ENCOUNTER
Due to a scheduling conflict, l/m asking patient if okay to schedule surgery a little later, please transfer to [id number]

## 2020-02-19 NOTE — TELEPHONE ENCOUNTER
Pt's wife called. Pt is scheduled for a nurse only appointment on 3-17-20 catheter pre op. Caller requesting to reschedule to 3-3-20, 3-4-20 or 3-5-20 at 11 am or later. Appointment is for the catheter change.   Please call

## 2020-02-27 NOTE — TELEPHONE ENCOUNTER
Pt's wife called. Pt is scheduled for surgery on 3-26-20.   pt is to have a blood test at the lab scheduled  3-17-20. Need a nurse appointment to have a urine test in the office also. Can pt be seen 3-17-20.   Call to advise

## 2020-03-04 NOTE — TELEPHONE ENCOUNTER
Was asked by surgery scheduler( who states she spoke with nurse manager) to see if pt can come to clinic for nurse visit earlier today(d/t need for procedure room) . Phoned pt and spoke wife. She agrees to bring pt at 11am. Appt changed in epic.

## 2020-03-04 NOTE — PROGRESS NOTES
I called the pt and his spouse into the exam room and I introduced myself and verified the pt's name and . I explained that I will be removing his existing clark cath and replacing with a new one.  I then assisted the pt out of his w/c with his wife's as night drainage bag. I then assisted the pt off the exam table and to redress. I instructed pt to make another n/v appt in 1 month for the next clark cath change. Pt verbalized understanding and compliance.     I s/w PVK and informed him of pt's rash and he

## 2020-03-13 NOTE — TELEPHONE ENCOUNTER
Per pt's wife, is calling to cancel surgery on 3/26/20. Wanting to reschedule sometime during the fall when everything calms down. Also needing to reschedule appt to change catheter, Please call wife back .

## 2020-03-16 NOTE — TELEPHONE ENCOUNTER
Benjamin luther:  Called patient verbally cancelled surgery for Thursday 03/26/2020, wants to wait for now

## 2020-03-16 NOTE — TELEPHONE ENCOUNTER
Pt's wife called to cancel nurse only appointment on 3-17-20 for catheter change and surgery 3-26-20. Call to reschedule.

## 2020-03-25 NOTE — TELEPHONE ENCOUNTER
S/W pt' s spouse who called for n/v appt for monthly clark cath change and I gave an appt for Monday 3/30 at 11:20 am.

## 2020-03-30 NOTE — TELEPHONE ENCOUNTER
Noted. Thank you. Patient's wife contacted. Relayed Dr. Liliana Norman' message/recommendations below to patient's wife. Patient's wife states patient denies fever, couch, sob, sneezing. Azul will be changed today as planned.

## 2020-03-30 NOTE — TELEPHONE ENCOUNTER
Patient is scheduled for monthly clark catheter change today. Last clark catheter change was 3/4/2020 (about 4 weeks ago).       Dr. Margaret Clay, do to the COVID-19 (Coronavirus) global pandemic outbreak and shelter in place, would you recommend patient re

## 2020-03-30 NOTE — PROGRESS NOTES
Patient's name and  verified, physician's order verified. Patient's urine in clark bag is noted to be clear yellow. Patient's clark catheter balloon deflated, clark gently removed.   Patient prepped and draped in the usual sterile fashion, 5 ml 2% lidoc

## 2020-03-30 NOTE — TELEPHONE ENCOUNTER
Please call patient back.   As long as no fevers, cough, sneezing, runny nose, change Azul today if possible and then after that, decreased every 6 to 8 weeks because of COVID19 health crisis and exact timing will depend on presence or absence of any visib

## 2020-05-06 NOTE — PROGRESS NOTES
Patient's name and  verified, physician's order verified. Patient assisted to exam table with help from his wife. Patient's urine in clark bag is noted to be clear yellow. Patient's clark catheter balloon deflated. Clark gently removed.      Patient drap

## 2020-05-27 NOTE — PROGRESS NOTES
Neurology Telephone / Virtual Follow-up Note    Nhi Hurley is a 78year old male. HPI:     Patient being seen in follow up. I saw him in the office last in Nov 2019. Him and wife both available on the phone. She feels he has been doing \"great. HALLUCINATION  Codeine                 OTHER (SEE COMMENTS)    Comment:Pt could not wake up.       ROS:   GENERAL: no weight loss or fevers  HEENT: hypophonia  MUSCULOSKELETAL: chronic gait impairment  PSYCH: cognitive impairment  NEURO: see HPI      P

## 2020-06-03 PROBLEM — G12.29 PSEUDOBULBAR PALSY (HCC): Status: ACTIVE | Noted: 2020-01-01

## 2020-06-03 NOTE — PROGRESS NOTES
HPI:   Vipul Bell is a 78year old male who presents for a Medicare Subsequent Annual Wellness visit (Pt already had Initial Annual Wellness).     She comes in for Medicare annual physical here with his wife who gives most of the history says lately pa problems with Memory based on screening of functional status.    Memory Problems?: Yes       Depression Screening (PHQ-2/PHQ-9): Over the LAST 2 WEEKS   Little interest or pleasure in doing things (over the last two weeks)?: Not at all  Feeling down, depres fracture of vertebra, initial encounter [facility])     Fall as cause of accidental injury in home as place of occurrence     Balanitis     Parkinson disease ([facility] [address]     Hydronephrosis     Abnormal findings on diagnostic imaging of cardiovascular system     Hypona mg by mouth every 6 (six) hours as needed for Pain. Cyanocobalamin (B-12) 500 MCG Oral Tab, Take 1 tablet by mouth After dinner. Vitamin D3 1000 units Oral Tab, Take 1,000 Units by mouth daily.        MEDICAL INFORMATION:   He  has a past medical histor Physical Exam   Nursing note and vitals reviewed. Constitutional: He is oriented to person, place, and time. HENT:   Head: Normocephalic and atraumatic.    Right Ear: Tympanic membrane and ear canal normal.   Left Ear: Tympanic membrane and ear Magda Sasha SCREEN ANNUAL  -     LIPID PANEL; Future  -     COMP METABOLIC PANEL (14); Future  -     CBC WITH DIFFERENTIAL WITH PLATELET;  Future    Difficulty walking chronic wheelchair-bound due to advanced parkinsonism patient and wife interested in some more home t therapy    Generalized weakness as above  Abnormal gait as above mostly wheelchair-bound able to transfer         Diet assessment: good     PLAN:  The patient indicates understanding of these issues and agrees to the plan.   Reinforced healthy diet, lifesty for this patient.   Update Health Maintenance if applicable     Immunizations (Update Immunization Activity if applicable)     Influenza  Covered Annually No vaccine history found Please get every year    Pneumococcal 13 (Prevnar)  Covered Once after 65 01/

## 2020-06-03 NOTE — PATIENT INSTRUCTIONS
Reyna Meehan's SCREENING SCHEDULE   Tests on this list are recommended by your physician but may not be covered, or covered at this frequency, by your insurer. Please check with your insurance carrier before scheduling to verify coverage.     PREVENTATIV years- more often if abnormal There are no preventive care reminders to display for this patient. Update Health Maintenance if applicable    Flex Sigmoidoscopy Screen  Covered every 5 years No results found for this or any previous visit.  No flowsheet data covered by Medicare Part B) No orders found for this or any previous visit.  This may be covered with your prescription benefits, but Medicare does not cover unless Medically needed    Zoster (Not covered by Medicare Part B) No orders found for this or any

## 2020-06-08 NOTE — PROGRESS NOTES
Patient's name and  verified. Patient presents with his wife for monthly clark catheter change. Patient urine in clark bag is yellow. Patient assisted to exam table with help of his wife.  Patient's left groin area is noted to be reddened, with white d

## 2020-06-08 NOTE — TELEPHONE ENCOUNTER
PT with [facility] calling to state she received and will follow up with the patient for 8 weeks. Started on 6/5.

## 2020-06-08 NOTE — TELEPHONE ENCOUNTER
Urology nurses,  On 6/8/2020 Monday patient has nurse appointment to change Azul catheter; if urine is cloudy, please irrigate with normal saline until clear, then changed catheter.   Many thanks, Dr. Jacquelin Kennedy

## 2020-06-11 NOTE — TELEPHONE ENCOUNTER
I looked up the Clotrimazole script and noted that it was made as a phone in script and I LM for pt that I would call in the script since it looks as though it was not called in if they were told there was no script for them.  I then called the [employer]

## 2020-06-11 NOTE — TELEPHONE ENCOUNTER
Per wife they were supposed to get a call from rn to let them know if she was sending a prescription.  Please advise

## 2020-06-12 NOTE — TELEPHONE ENCOUNTER
Patient's wife returned call and transferred to me.   Patient's wife states that when she went to [facility] yesterday to  the clotrimazole-bethamethasone cream, she also picked up the cipro 250 mg prescription that Dr. Tavo Gresham prescribed 5 day

## 2020-06-12 NOTE — TELEPHONE ENCOUNTER
----- Message from Anjali Peña MD sent at 6/10/2020  8:44 PM CDT -----  Urology nurses, please call patient's wife since patient is disabled---  Urine culture final with 2 different organisms and they are both sensitive to nitrofurantoin/Macrobid.   I

## 2020-07-08 NOTE — PROGRESS NOTES
Patient presents with his wife. Patient's name and  verified. Physician's order verified. Patient's urine in clark bag is noted to be clear yellow. Patient's wife states patient has no urological complaints. Patient is assisted to [facility] table.   Krystle

## 2020-07-27 NOTE — TELEPHONE ENCOUNTER
Please reply to pool: EM RN TRIAGE    Action Requested: Summary for Provider     []  Critical Lab, Recommendations Needed  [] Need Additional Advice  [x]   FYI    []   Need Orders  [] Need Medications Sent to Pharmacy  []  Other     SUMMARY: [id number] 14

## 2020-07-28 NOTE — ED NOTES
Pt presents to the ED with wife for eval of fever. Wife states pt had mechanical fall on Sunday. Was evaluated at [facility] [place], operated by [facility] and discharged home with back pain. Wife states Monday, pt was diaphoretic-checked temp today (100.6).  Wife medicated with T

## 2020-07-28 NOTE — ED INITIAL ASSESSMENT (HPI)
Patient presents to ER with c/o mechanical fall on Sunday. Patient seen at [facility] after the fall and discharged home. Patient also has had night sweats starting Monday night. Fever of 100.5.  HX of parkinsons

## 2020-07-28 NOTE — TELEPHONE ENCOUNTER
Spoke with wife Sadiq Schmidt this morning. Patient went to [facility] ER, had imaging and was discharged back to home. No acute process seen on imaging. Early this morning, however, patient awoke with sweats.  Wife said patient was very warm, she didn't take his tem

## 2020-07-28 NOTE — TELEPHONE ENCOUNTER
If he looks sick then yes to ER, or any cough with sob or feeling very weakbut if not can take some tylenol or motrin as need and monitor to symptoms and vitals

## 2020-07-28 NOTE — TELEPHONE ENCOUNTER
Spoke with pt spouse and pt coughing excessively in back round. Dr Erica Kayser message given.   Per spouse she will take him to the ER for eval.

## 2020-07-29 NOTE — ED NOTES
Patient stable. Respirations even and unlabored. No distress noted. Denies Pain. Vital signs obtained. Patient and wife aware of plan of care.

## 2020-07-29 NOTE — ED PROVIDER NOTES
Patient Seen in: [facility] AND [facility] Emergency Department    History   Patient presents with:  Fever      HPI    Patient presents to the ED after falling several days ago. He states this is not unusual given history of Parkinson's.   Started having some sw Drug use: No    Other Topics      Concerns:        Caffeine Concern: Yes          tea; 1 cup daily         Exercise: No    Social History Narrative      The patient uses the following assistive device(s):  rolling walker, walker and wheelchair.         The dry.   Psychiatric: He has a normal mood and affect. His behavior is normal.   Nursing note and vitals reviewed.       ED Course        Labs Reviewed   BASIC METABOLIC PANEL (8) - Abnormal; Notable for the following components:       Result Value    Glucose 22 18 18   Temp: 99.7 °F (37.6 °C)  98.3 °F (36.8 °C)   TempSrc: Temporal  Oral   SpO2: 96% 96%    Weight: 81.6 kg     Height: 177.8 cm (5' 10\")       *I personally reviewed and interpreted all ED vitals.     Pulse Ox: 96%, Room air, Normal     Monitor Int times daily for 14 days. , Normal, Disp-28 tablet, R-0

## 2020-07-30 NOTE — TELEPHONE ENCOUNTER
Please see previews encounter from nurse triage 07/27  Nurse calling back for an update, states patient now a UTI and with back pain. Nurse states patient was taken to the ER and wanted to have Dr. Muriel Gilbert aware.

## 2020-08-03 NOTE — TELEPHONE ENCOUNTER
Per spouse wants to discuss 7/28 [facility]   ED visit with RN. Please call thank you.

## 2020-08-17 NOTE — TELEPHONE ENCOUNTER
Pt's wife called. Pt was seen at [facility] emergency room on 7-28-20 for uti. Back pain and  Lethargic. Pt has finished the 2 week supply of  rx sulfamethoxazole last Tuesday. 8-17-20 pt's right foot and ankle is swollen. Leg cramps at night.

## 2020-08-17 NOTE — TELEPHONE ENCOUNTER
See below. Returned wife's call and spoke with her. Pt is s/p ER visit 7/28, whereby he was treated for uti and recent fall. She describes pt as being afebrile, with clear yellow urine in his clark.  She states pt is now c/o cramping in right leg and his rig

## 2020-08-17 NOTE — TELEPHONE ENCOUNTER
Phoned pt's wife at this time as  It does not appear she took pt to the ER. She states she left a message with 's office and was waiting for that office to call her back.  She states  Swati Parker is not there and was told his associate was going to get

## 2020-08-17 NOTE — TELEPHONE ENCOUNTER
Action Requested: Summary for Provider     []  Critical Lab, Recommendations Needed  [x] Need Additional Advice  []   FYI    []   Need Orders  [] Need Medications Sent to Pharmacy  []  Other     SUMMARY: Spouse reports patient was in ED 7/28 and treated fo

## 2020-08-17 NOTE — TELEPHONE ENCOUNTER
The patient called urology today 8/17/2020 and was instructed to go to the ED. Please see urology notes.

## 2020-08-18 NOTE — ED INITIAL ASSESSMENT (HPI)
Presents for right leg swelling since Saturday, wife states much worse today. Wife reports recently treated for UTI.

## 2020-08-18 NOTE — ED PROVIDER NOTES
Patient Seen in: [facility] [place] AND [facility] Emergency Department      History   Patient presents with:  Swelling Edema    Stated Complaint: swelling inankle here for US    HPI    24-year-old male presents the ER with complaint of right lower extremity swelling. 97.8 °F (36.6 °C)   Temp src Oral   SpO2 97 %   O2 Device None (Room air)       Current:/77   Pulse 57   Temp 97.8 °F (36.6 °C) (Oral)   Resp 20   Ht 172.7 cm (5' 8\")   Wt 81.6 kg   SpO2 97%   BMI 27.37 kg/m²         Physical Exam  Vitals signs and leg bag to the left leg and to elevate the leg to help reduce the swelling.               Disposition and Plan     Clinical Impression:  Swelling of lower extremity  (primary encounter diagnosis)    Disposition:  Discharge  8/17/2020 10:39 pm    Follow-up:

## 2020-08-25 NOTE — PROGRESS NOTES
HPI:    Patient ID: Gaston Araiza is a 78year old male. HPI  In today for follow-up from hospital seen in ER for UTI treated with antibiotic patient developed swelling to bilateral feet he is on amlodipine for be which could be causing the swelling . result with hydrogen peroxide 1 Tube 3   • acetaminophen 325 MG Oral Tab Take 650 mg by mouth every 6 (six) hours as needed for Pain. • Cyanocobalamin (B-12) 500 MCG Oral Tab Take 1 tablet by mouth After dinner.        • Vitamin D3 1000 units Oral Tab T present. Cardiovascular: Normal rate, regular rhythm, normal heart sounds and intact distal pulses. Exam reveals no friction rub. No murmur heard. Pulmonary/Chest: Effort normal and breath sounds normal. No respiratory distress. He has no wheezes.  He

## 2020-08-26 NOTE — PROGRESS NOTES
Virtual Telephone Check-In    Oswaldo Dent verbally consents to a Virtual/Telephone Check-In visit on 08/26/20. Patient has been referred to the [facility] website at [email or web address] to review the yearly Consent to Treat document.     Patient [place]

## 2020-08-31 NOTE — PROGRESS NOTES
HPI:    Patient ID: Chloe Porter is a 78year old male.     HPI  Patient here with wife for follow-up continues to have edema to bilateral lower extremities right worse than left he started the Lasix 20 mg the first day or so she says he produces good urin acetaminophen 325 MG Oral Tab Take 650 mg by mouth every 6 (six) hours as needed for Pain. • Cyanocobalamin (B-12) 500 MCG Oral Tab Take 1 tablet by mouth After dinner. • Vitamin D3 1000 units Oral Tab Take 1,000 Units by mouth daily.      • hydro the defined types were placed in this encounter.       Meds This Visit:  Requested Prescriptions      No prescriptions requested or ordered in this encounter       Imaging & Referrals:  None       #[id number]

## 2020-09-02 NOTE — PROGRESS NOTES
I called the pt into the exam room and I introduced myself and verified the pt's name and . I explained that I will be removing his existing clark cath and replace a new clark cath.  I then assisted the pt onto the exam table with the assistance of his s about 40 ml of clear yellow urine. Pt tolerated this well also. I then attached extension tubing to the clark cath and a leg bag to the tubing. I also placed the clark cath in a STAT lock and I secured it to pt's Rt. Upper inner thigh.  I then secured the d

## 2020-09-04 NOTE — PROGRESS NOTES
Virtual Telephone Check-In    Scotty Millan verbally consents to a Virtual/Telephone Check-In visit on 09/04/20. Patient has been referred to the [facility] website at [email or web address] to review the yearly Consent to Treat document.     Patient Janet Smith

## 2020-09-09 NOTE — PROGRESS NOTES
HPI:    Patient ID: Noelle Wood is a 78year old male.     HPI  Back for follow-up with wife patient has advanced Parkinson's disease as of lately he is having increased swelling to his lower extremities right more than left he was started on furosemide w gel to urinary meatus for pain, but wipe away catheter secretions that result with hydrogen peroxide 1 Tube 3   • acetaminophen 325 MG Oral Tab Take 650 mg by mouth every 6 (six) hours as needed for Pain.      • Cyanocobalamin (B-12) 500 MCG Oral Tab Take 1 normal.   Neck: Normal range of motion. Neck supple. No thyromegaly present. Cardiovascular: Normal rate, regular rhythm, normal heart sounds and intact distal pulses. Exam reveals no friction rub. No murmur heard.   Pulmonary/Chest: Effort normal and b

## 2020-09-16 NOTE — TELEPHONE ENCOUNTER
Patient's wife called and advised that they still have not received the refill for the medication listed below. The Pharmacy told them they did not receive a refill request from us. Patient is down to 2 pills left. Please Advise.        Please Use

## 2020-09-17 NOTE — TELEPHONE ENCOUNTER
The wife calling asking about the lasix refill. I stated was done yesterday 9/16/2020 with understanding. They will wait for the pharmacy to call them.

## 2020-10-02 NOTE — TELEPHONE ENCOUNTER
Refill request for sinemet  mg, BID, #360, 1 refill    LOV: 5/27/20  NOV: 10/9/20 with Dr. Twin Merida

## 2020-10-05 NOTE — TELEPHONE ENCOUNTER
Patient's wife calling to report patient in pain, c/o \"cut in penis is really bad\", sitting, standing make it worse; onset 3 days ago.   Patient's wife states she spoke with Dr. Bruce Pitts over the weekend and he advised patient to have an \"emergency\" vi

## 2020-10-05 NOTE — PROGRESS NOTES
HPI:    Patient ID: Kong Rivera is a 78year old male. HPI     Patient is a 78year old male who presents to the clinic for an evaluation regarding penile pain. Patient with clark catheter for chronic urinary retention.   Clark has been in place sin result with hydrogen peroxide 1 Tube 3   • acetaminophen 325 MG Oral Tab Take 650 mg by mouth every 6 (six) hours as needed for Pain. • Cyanocobalamin (B-12) 500 MCG Oral Tab Take 1 tablet by mouth After dinner.        • Vitamin D3 1000 units Oral Tab T wheezes. He has no rales. Abdominal: Soft. He exhibits no distension. There is no abdominal tenderness.    Genitourinary:    Genitourinary Comments: Azul catheter intact with significant penile erosion down the shaft of penis, mild erythema to foreskin, prescriptions requested or ordered in this encounter       Imaging & Referrals:  None        ARMSTRONG#[id number]

## 2020-10-05 NOTE — PATIENT INSTRUCTIONS
1. Urine to be sent for culture today    2. Cystoscopy with open insertion of suprapubic tube-- General anesthesia [facility] same day    3. Please hold aspirin and NSAIDS (Advil, Motrin, Aleve, ibuprofen) for 7-10 days prior to the procedure.   It is ok to take

## 2020-10-05 NOTE — PROGRESS NOTES
Patient seen in office scheduled for cystoscopy insertion of suprapubic tube( open procedure)   REQUEST [address], Tuesday 10/13/2020,[facility] [place]/outpatient, urine culture done in office, all questions answered, verbalized u

## 2020-10-05 NOTE — TELEPHONE ENCOUNTER
Pt's wife called. Appointment today 10-5-20. Pt is scheduled for surgery on 10-13-20. Pt's cardiologist can not see pt until January 2021.   Please call to advise

## 2020-10-07 NOTE — PROGRESS NOTES
Taj Rodriguez is a 78year old male. Patient presents with:  Pre-Op: pt in for cardiac clearance, having cysto on Tuesday. denies any chest pain or SOB    HPI:   Patient comes in today for follow-up.  He sees Dr. Jagruti Bray he has a history of Parkinson's he is 20 mEq by mouth daily.      • Lidocaine HCl 2 % External Gel Apply small amt of gel to urinary meatus for pain, but wipe away catheter secretions that result with hydrogen peroxide 1 Tube 3   • acetaminophen 325 MG Oral Tab Take 650 mg by mouth every 6 (six no cyanosis, clubbing or edema    ASSESSMENT AND PLAN:     Problem List Items Addressed This Visit     None      Visit Diagnoses     Pre-op exam    -  Primary    Relevant Orders    ELECTROCARDIOGRAM, COMPLETE          Patient is doing fair from a cardiac s

## 2020-10-07 NOTE — TELEPHONE ENCOUNTER
Patient traci calling and they canceling the appointment for October 12 due to patient having surgery October 13 with Dr. Checo Wolf      Please advise   [phone] she will reschedule but his feet is doing better

## 2020-10-07 NOTE — PATIENT INSTRUCTIONS
1. May proceed with cysto   2. If pain on hip from fall see ED  3.  See Dr. Lauren Segundo in Jan 2021

## 2020-10-07 NOTE — TELEPHONE ENCOUNTER
Spoke with wife Hunter Ocampo adv urine culture is positive and Cipro was sent to Pharmacy, Wife understood and verbally agreed     ----- Message from [address] sent at 10/7/2020  3:25 PM CDT -----  Please let patient know his urine culture is

## 2020-10-08 NOTE — CM/SW NOTE
[facility] arranged to transport patient home post discharge from ER. Patient's wife will be driving home separately. ETA 90 mins (approx 1615)    Rate $57.00    PCS form completed in Epic, printed and given to RN. Face Sheet printed also.     MD

## 2020-10-08 NOTE — ED NOTES
Pt provided with discharge instructions. Verbalized understanding for plan of care at home and follow up. All questions/concerns addressed prior to discharge.    Pt wheeled out of the ER with medicar transporter

## 2020-10-08 NOTE — ED INITIAL ASSESSMENT (HPI)
Per wife patient fell yesterday in a parking garage, states patient was not complaining of pain at the time, wife states he has bruising noted to R hip and pain to R hip and R arm, no deformities noted, hx of parkinsons

## 2020-10-08 NOTE — CM/SW NOTE
Spoke with patient and patient's wife regarding their current situation and set up at home, as wife is the sole care giver for the patient, who has Parkinson's Disease and is quite limited in his mobility.     Per patient's wife, Niru Rodriguez, they [place] on [address]  3. N.E.W.T. information for Non-Emergency Wheelchair Transportation information   given to patient's wife Johana Pimentel, as an option or alternative to bringing patient    Places herself.   She states that she struggles getting patient in and out of   The wh

## 2020-10-08 NOTE — ED PROVIDER NOTES
Patient Seen in: [facility] Emergency Department      History   Patient presents with:  Fall    Stated Complaint: fall    HPI     49-year-old male with Parkinson's here with his wife who takes excellent care of him with a caregiver only couple gregor 161/90   Pulse 61   Resp 18   Temp 98 °F (36.7 °C)   Temp src    SpO2 98 %   O2 Device        Current:BP (!) 161/90   Pulse 61   Temp 98 °F (36.7 °C)   Resp 18   Wt 81.6 kg   SpO2 98%   BMI 27.37 kg/m²         Physical Exam    Constitutional: Oriented to p posttraumatic soft tissue swelling or olecranon bursitis. EFFUSION: None visible. OTHER: Negative. CONCLUSION:  1. No fracture or dislocation. Mild soft tissue swelling at the olecranon.   Correlate clinically for infection, posttraumatic soft tiss pressure.       Medications Prescribed:  Current Discharge Medication List

## 2020-10-08 NOTE — ED NOTES
[facility] for [facility] home. Pt aware of plan of care.

## 2020-10-13 NOTE — BRIEF OP NOTE
[facility] POST ANESTHESIA CARE UNIT  Brief Op Note       Patients Name: Primus Nancy  Attending Physician: Yin Steele MD  Operating Physician: Aurora Reyes MD  CSN: [id number]     Location:  OR  MRN: [id number]    Date of Birth: [birth year]

## 2020-10-13 NOTE — CM/SW NOTE
SW received call from Same Day Surgery who stated the pt. Will need HHC upon discharge as he will be discharging home with a clark. Referral made to Ashley Ace with [facility]. [facility] to follow up with the pt.       [facility] order and face to face

## 2020-10-13 NOTE — ANESTHESIA PREPROCEDURE EVALUATION
Anesthesia PreOp Note    HPI:     Beba Bernal is a 78year old male who presents for preoperative consultation requested by: Karri Su MD    Date of Surgery: 10/13/2020    Procedure(s):  CYSTOSCOPY  CATHETER INSERTION SUPRA PUBIC  Indication: Ne 06/25/2017        Past Medical History:   Diagnosis Date   • Age-related nuclear cataract of both eyes 3/13/2019   • Back pain    • Back problem     lower back spinal stenosis   • Cataract    • Chronic indwelling Azul catheter    • Enlarged prostate    • meatus for pain, but wipe away catheter secretions that result with hydrogen peroxide, Disp: 1 Tube, Rfl: 3, Past Month at Unknown time    •  acetaminophen 325 MG Oral Tab, Take 650 mg by mouth every 6 (six) hours as needed for Pain., Disp: , Rfl: , Past W 1962        Years since quittin.1      Smokeless tobacco: Never Used    Substance and Sexual Activity      Alcohol use: No        Alcohol/week: 0.0 standard drinks      Drug use: No      Sexual activity: Not on file    Lifestyle      Physical act 09/09/2020    BUN 21 (H) 09/09/2020    CREATSERUM 1.21 09/09/2020    GLU 97 09/09/2020    CA 9.0 09/09/2020          Vital Signs: Body mass index is 25.1 kg/m². height is 1.803 m (5' 11\") and weight is 81.6 kg (180 lb).  His oral temperature is 98 °F (3

## 2020-10-13 NOTE — PROGRESS NOTES
Spoke with Asim Weathers, , put in order for visiting nurse to come to patient house and remove clark catheter in 2 days (10/5/2020). Per Asim Weathers, will arrange with home health to follow up with patient- ok to discharge when medically ready.  Lisa hopkins

## 2020-10-13 NOTE — TELEPHONE ENCOUNTER
LM for spouse that we did not call her but that PVK did send a msg as stated below but with discharge instructions that [facility] and also that family to call for a N/V appt in 3 weeks for SP tube change. She can c/b if she has questions.

## 2020-10-13 NOTE — TELEPHONE ENCOUNTER
10/13/2020 cystoscopy with removal small bladder stone, and insertion of percutaneous suprapubic catheter, [facility], general anesthesia, same-day surgery    The following are urology discharge instructions---    Urology discharge instruct

## 2020-10-13 NOTE — HOME CARE LIAISON
Received referral form Isac Chakraborty. Called patients spouse and left VM. Also left VM on home phone number. Waiting to hear back to see if patient is interested in hh services.

## 2020-10-13 NOTE — INTERVAL H&P NOTE
Pre-op Diagnosis: Neurogenic bladder [N31.9]    The above referenced H&P was reviewed by Juan Diego Kaminski MD on 10/13/2020, the patient was examined and no significant changes have occurred in the patient's condition since the H&P was performed.   Adrian Carcamo

## 2020-10-13 NOTE — H&P
UROLOGY PREOPERATIVE  HISTORY AND PHYSICAL    SERA Rojas   Nurse Practitioner   Specialty:  Nurse Practitioner   Progress Notes      Signed   Encounter Date:  10/5/2020               Signed             HPI:    Patient ID: Darius Terrazas maryjo bailon • lactulose 10 GM/15ML Oral Solution Take 20 g by mouth daily.       • POTASSIUM OR Take 20 mEq by mouth daily.       • Lidocaine HCl 2 % External Gel Apply small amt of gel to urinary meatus for pain, but wipe away catheter secretions that result with hyd Constitutional: No distress. HENT:   Head: Normocephalic. Eyes: Conjunctivae are normal.   Neck: Normal range of motion. Cardiovascular: Normal rate. Exam reveals no friction rub. No murmur heard.   Pulmonary/Chest: Effort normal and breath sounds n 5.  Please arrange with urology nurses to come to the office 7 days prior to procedure for specimen from clark catheter for urine culture so we know which antibiotic to give you before your procedure     6.  Please do not take your hydrochlorothiazide on t

## 2020-10-13 NOTE — ANESTHESIA PROCEDURE NOTES
Airway  Date/Time: 10/13/2020 11:14 AM  Urgency: elective    Airway not difficult    General Information and Staff    Patient location during procedure: OR  Anesthesiologist: Krissy Recinos MD  Resident/CRNA: Mario Pickett CRNA  Performed: CRNA     Indicat

## 2020-10-14 NOTE — TELEPHONE ENCOUNTER
RN called wife in response to her recent call. Per wife, Ms Zuniga Oris that the [facility] nurse is NOT able to come to their house today. No nurse is available until tomorrow. She is concerned. [facility] will call tonight to arrange for the time.      RN assur

## 2020-10-14 NOTE — TELEPHONE ENCOUNTER
Spoke to patient's wife. Patient's wife states patient's SPT insertion site was bleeding last night, is worried that \"sutures\" came out.       Wife reports that this morning, patient's supra pubic site dressing has dried blood, no new bleeding, no bleedin

## 2020-10-14 NOTE — OPERATIVE REPORT
[facility]    PATIENT'S NAME: Vargas Kind PHYSICIAN: Emiliano Nieves MD   OPERATING PHYSICIAN: Emiliano Nieves MD   PATIENT ACCOUNT#:   [id number]    LOCATION:  [place] [address]  MEDICAL RECORD #:   [id number]       D size.  In the right mid posterior wall of the bladder, there is a another large wide-mouth diverticulum with the opening of the diverticulum being about 7 cm in size, and there are other smaller diverticula in the bladder.   There is only mild cystitis in t small bladder calculus present in the bladder on the floor of the bladder; the stone was dark brown in color, relatively round, its size was about 4 mm; it was grasped with endoscopic grasping forceps under direct visualization and removed; there were no o

## 2020-10-14 NOTE — TELEPHONE ENCOUNTER
RN relayed MD's message, \"I agree that dressing should be changed today and continue all other plans as previously documented. \" Wife verbalized understanding. Waiting for [facility] RN to come to their house. All questions answered.  Also, agreeable to plans, as

## 2020-10-14 NOTE — TELEPHONE ENCOUNTER
Per wife pt pulled and broke sutures during the night and is bleeding. Home rn is not scheduled until 10/15. Per wife they are very worried about infection.  Please advise

## 2020-10-14 NOTE — TELEPHONE ENCOUNTER
I agree that dressing should be changed today and continue all other plans as previously documented.   Thank you, Dr. Sanjiv Pina

## 2020-10-15 NOTE — TELEPHONE ENCOUNTER
[facility] indicated that started home health services today. Will be sending paperwork for doctor to sign.

## 2020-10-16 NOTE — TELEPHONE ENCOUNTER
S/W pt's spouse and informed her of PVK's instructions as stated below and we arranged a sooner N/V appt for SP tube cath change.  For Monday 11/2 at 1:20 pm. I cxld the other appt pt made pre surgery.         10/13/20 1:00 PM  Note     10/13/2020 cystosc

## 2020-10-29 NOTE — TELEPHONE ENCOUNTER
Patient's spouse, Xin Woodruff, called with update for Dr. Garland Sood. She states that on 10/13 patient had cystoscopy and is now in-patient at [facility] in [place]. She states that they are working on physical and occupational therapy to get patient stronger and is hoping he is only there for 2 weeks. Please be aware.

## 2020-11-02 NOTE — PROGRESS NOTES
Pt to dept per stretcher by [facility]; wife present. I introduced myself and verified the pt's name and . I explained that I will be removing his existing SP cath tube and replacing a new one; first s/p catheter change since surgical placement.  The

## 2020-11-03 PROBLEM — A41.9 SEVERE SEPSIS (HCC): Status: ACTIVE | Noted: 2020-01-01

## 2020-11-03 PROBLEM — N28.9 ACUTE RENAL INSUFFICIENCY: Status: ACTIVE | Noted: 2020-01-01

## 2020-11-03 PROBLEM — R65.20 SEVERE SEPSIS (HCC): Status: ACTIVE | Noted: 2020-01-01

## 2020-11-03 NOTE — TELEPHONE ENCOUNTER
Please call nurse back. Nurse should put in  250 cc or more of normal saline and through the suprapubic tube, before trying to irrigate; if she does that and still does not get a return, to call us back again.   Thank you, Dr. Bo Garces

## 2020-11-03 NOTE — ED PROVIDER NOTES
Patient Seen in: [facility] AND [facility] Emergency Department      History   Patient presents with:  Cath Tube Problem    Stated Complaint: Azul issue    HPI    42-year-old male presents for evaluation for malfunction of his suprapubic catheter.   Patient had All other systems reviewed and negative except as noted above.     Physical Exam     ED Triage Vitals [11/03/20 1527]   /72   Pulse 94   Resp (!) 39   Temp 99.6 °F (37.6 °C)   Temp src Oral   SpO2 94 %   O2 Device None (Room air)       Current:/ BUN 50 (*)     Creatinine 1.75 (*)     BUN/CREA Ratio 28.6 (*)     Calculated Osmolality 337 (*)     GFR, Non- 36 (*)     GFR, -American 41 (*)     All other components within normal limits   LACTIC ACID, PLASMA - Abnormal; Notable Prior to procedure documentation was reviewed, informed consent was obtained with risks, benefits and alternative discussed, appropriate equipment was present, a time out was performed to identify the correct patient, procedure and site.    The previous [facility] PROCEDURE: XR CHEST AP PORTABLE  (CPT=71045) TIME: 1707 hours.    COMPARISON: [facility], XR CHEST AP PORTABLE (CPT=71045), 7/28/2020, 6:59 PM.  [facility], XR CHEST AP PORTABLE (CPT=71010), 12/07/2017, 8:48 AM.  Elton Ulrich Present on Admission  Date Reviewed: 10/13/2020          ICD-10-CM Noted POA    [facility] A41.9, R65.20 11/3/2020 Unknown

## 2020-11-03 NOTE — TELEPHONE ENCOUNTER
S/W Tory Howard the nurse at the [facility] and determined that the night nurse informed that he had no urine output all night and that she tried irrigating thru the SP tube and had no resistance but she had no urine return.  She states she then [facility]

## 2020-11-03 NOTE — ED NOTES
Orders for admission, patient is aware of plan and ready to go upstairs. Any questions, please call ED MERVAT Paz  at [phone].    Type of COVID test sent:-Rapid   COVID Suspicion level: Low/High-negative    Other pertinent information-From NH, non verb

## 2020-11-03 NOTE — TELEPHONE ENCOUNTER
Per tammy there has been no output from pts catheter since last night, pt also has low grade fever, unable to reach RN. Please call thank you.

## 2020-11-03 NOTE — TELEPHONE ENCOUNTER
S/W nurse Annalise Ferrara and informed her of PVK's instructions and she verbalized understanding and compliance ad will call me back. I gave her the nurse line X [phone].

## 2020-11-03 NOTE — TELEPHONE ENCOUNTER
The Nurse Jeni Alvarado from [facility] called back and stated that she instilled 250 ml of saline as instructed and still did not get a return from pt's SP tube. I told her that I will s/w PVK to inform him and get back to her with a response.  Tasked to [address], pe

## 2020-11-03 NOTE — TELEPHONE ENCOUNTER
S/W the nurse Naa Hough again since she called back before I could s/w PVK and she informed that she s/w the MD on site about this pt and he reviewed the pt's labs and noted that his WBC count is elevated at 16.6 and pt still has a low grade temp and he felt salvatore

## 2020-11-04 NOTE — CM/SW NOTE
RAQUEL self referred pt for d/c planning. Per RN rounds, pt is contracted and non verbal. Reports that pt is from [place]. RAQUEL contacted pt's wife Elzbieta via phone. She verified pt's address and confirmed that they live in a single level [facility].  There are

## 2020-11-04 NOTE — PLAN OF CARE
BPA sepsis protocol fired this morning. T-100.2  RR 30-32  HR-94  /76, pt.drowsy,unable to take pills this morning.  notified,with orders made. Will continue to monitor pt.

## 2020-11-04 NOTE — PLAN OF CARE
Problem: Patient Centered Care  Goal: Patient preferences are identified and integrated in the patient's plan of care  Description: Interventions:  - What would you like us to know as we care for you?  Pt.originally from home with wife but was admitted fr INTERVENTIONS:  - Assess patient’s ability to void and empty bladder  - Monitor intake/output and perform bladder scan as needed  - Follow urinary retention protocol/standard of care  - Consider collaborating with pharmacy to review patient's medication pr

## 2020-11-04 NOTE — CONSULTS
[place] [facility]    Report of Consultation    Date of Admission:  11/3/2020  Date of Consult:  11/3/2020   Reason for Consultation:     MARIANO and hypernatremia     History of Present Illness:     Patient is a 78 yrs old male with pmh of parkinson degeneration Neg        Social History  Patient Guardians:  Not on file    Other Topics            Concern  Caffeine Concern        Yes    Comment:tea; 1 cup daily   Exercise                No    Social History Narrative    The patient uses the following a --    GFRAA 41*  --    GFRNAA 36*  --    CA 9.4  --    ALB  --  3.2*   *  --    K 4.3  --    *  --    CO2 28.0  --    ALKPHO  --  63   AST  --  28   ALT  --  18   BILT  --  0.5   TP  --  7.4     No results found for: PTT, INR  No results for in

## 2020-11-04 NOTE — SLP NOTE
ADULT SWALLOWING EVALUATION    ASSESSMENT    ASSESSMENT/OVERALL IMPRESSION:  PPE REQUIRED. THIS THERAPIST WORE GLOVES, DROPLET MASK, AND GOGGLES FOR DURATION OF EVALUATION. HANDS WASHED UPON ENTRANCE/EXIT.     RN contacted SLP for evaluation and expressed p PLAN: SLP to f/u x3 meal assessments, monitor CXR, and VFSS if clinically warranted.      RECOMMENDATIONS   Diet Recommendations - Solids: Mechanical soft chopped  Diet Recommendations - Liquid: Nectar thick(no straws)      Compensatory Strategies Recom OBJECTIVE   ORAL MOTOR EXAMINATION  Dentition: Functional;Natural  Symmetry: Within Functional Limits  Strength: (overall reduced)  Tone: (overall reduced)  Range of Motion: Within Functional Limits  Rate of Motion: Reduced    Voice Quality: Weak  Resp

## 2020-11-04 NOTE — ED NOTES
Pt is resting-bed, respirations easy and nonlabored, no acute distress at this time. Wife is at the bedside.

## 2020-11-04 NOTE — H&P
[facility]    History and Physical    Jenrich Decent Patient Status:  Inpatient    1940 MRN [id number]   Location [facility] 3W/SW Attending My Taylor MD   Hosp Day # 1 PCP Nicole Mcqueen MD     Date:  2020  Date of Neg    • Glaucoma Neg    • Macular degeneration Neg      Social History:  Social History    Tobacco Use      Smoking status: Former Smoker        Quit date: 1962        Years since quittin.2      Smokeless tobacco: Never Used    Alcohol use:  No Pulmonary/Chest: Effort normal and breath sounds normal.   Abdominal: Soft.  Bowel sounds are normal.   Neurological:   Pt confused, not following commands          Results:     Lab Results   Component Value Date    WBC 13.0 (H) 11/04/2020    HGB 13.4 11/ improved today continue with IV antibiotics IV fluids ID on board        Difficulty walking PT OT patient will need rehab wife wants to take him home will see how he does        Balance problem due to advanced Parkinson's as above        Acute cystitis wit

## 2020-11-04 NOTE — PROGRESS NOTES
[facility] Dosing Service    Initial Pharmacokinetic Consult for Vancomycin Dosing     Nhi Hurley is a 78year old patient who is being treated for bacteremia.   Pharmacy has been asked to dose Vancomycin by ROBERT Uribe an follow and adjust as necessary.     Robson Samaniego, [facility]  11/4/2020  3:25 PM  [address]: [phone]

## 2020-11-04 NOTE — CONSULTS
[place] [facility]  Report of Consultation    Bladimir Dugway Patient Status:  Inpatient    1940 MRN [id number]   Location [facility] 3W/SW Attending Chelsey Moura MD   Hosp Day # 1 PCP Sanford Dykes MD     D 100.4. Spouse tells me patient is more sleepy than usual, poor appetite. Suprapubic catheter draining to gravity with clear yellow urine in drainage bag. Mild bloody drainage from penis, unsure if they had attempted to place urethral catheter. CARBIDOPA-LEVODOPA  MG Oral Tab, TAKE 2 TABLETS BY MOUTH TWICE DAILY    •  furosemide (LASIX) 20 MG Oral Tab, Take 1 tablet (20 mg total) by mouth daily.     •  clotrimazole-betamethasone 1-0.05 % External Cream, Apply a finger tip full of cream to th CREATSERUM 1.22 11/04/2020    BUN 41 (H) 11/04/2020     (H) 11/04/2020    K 4.0 11/04/2020     (H) 11/04/2020    CO2 31.0 11/04/2020     (H) 11/04/2020    CA 8.6 11/04/2020    ALB 3.2 (L) 11/03/2020    ALKPHO 63 11/03/2020    BILT 0.5 11 exchanged in ER with return of adequate urine. WBC and lactic elevated. UA abnormal.  Urine culture pending. He is currently on rocephin pending results. WBC trending down today.       CT with mild right hydronephrosis, likely related to urinary ret fracture L3 lumbar vertebra; Dr. India Robb performed L3 vertebral augmentation with plastic and cement.  Dr. India Robb performed kyphoplasty 12/2/17. 12/9/17 Basil Bird infectious disease PA--continue Zosyn IV.  12/10/17 Deshawn Bird PA with infectious atelectatic rales in the bases  Cardio exam--regular rate and rhythm with normal S1, S2 without murmurs  Flanks nontender to percussion or palpation.   Bladder soft and nondistended with suprapubic 14 English catheter exiting out of the suprapubic area and u

## 2020-11-04 NOTE — CONSULTS
[place] [facility] - [facility] ID CONSULT NOTE    Mitzi Seats Patient Status:  Inpatient    1940 MRN [id number]   Location [facility] 3W/SW Attending Mulu Hurtado MD   Hosp Day # 1 PCP Carrie Olivier MD       Reason for Consultation that he quit smoking about 58 years ago. He has never used smokeless tobacco. He reports that he does not drink alcohol or use drugs.     Allergies:    Escitalopram            HALLUCINATION  Codeine                 OTHER (SEE COMMENTS)    Comment:Pt could n 11/04/20  0531   *  --  114*   BUN 50*  --  41*   CREATSERUM 1.75*  --  1.22   GFRAA 41*  --  64   GFRNAA 36*  --  55*   CA 9.4  --  8.6   ALB  --  3.2*  --    *  --  153*   K 4.3  --  4.0   *  --  120*   CO2 28.0  --  31.0   ALKPHO  --

## 2020-11-04 NOTE — PROGRESS NOTES
[facility]    Progress Note    Haydee Iyer Patient Status:  Inpatient    1940 MRN [id number]   Location [facility] 3W/SW Attending My Taylor MD   Hosp Day # 1 PCP Nicole Mcqueen MD       Subjective:   Haydee Iyer is 11/3/2020  CONCLUSION:   Examination limited by motion. Suprapubic catheter within the urinary bladder. Intraluminal bladder air is likely iatrogenic. Mild perivesicular fat stranding which may reflect cystitis. Advise correlation with urinalysis.   Mil

## 2020-11-05 NOTE — PROGRESS NOTES
[facility]    Progress Note    Lalo Grim Patient Status:  Inpatient    1940 MRN [id number]   Location [facility] 3W/SW Attending Misti Nath MD   Hosp Day # 2 PCP Margeret Claude, MD        Subjective:     Unable to pe 11/05/2020    CO2 30.0 11/05/2020     (H) 11/05/2020    CA 8.7 11/05/2020    ALB 3.2 (L) 11/03/2020    ALKPHO 63 11/03/2020    BILT 0.5 11/03/2020    TP 7.4 11/03/2020    AST 28 11/03/2020    ALT 18 11/03/2020    TSH 0.549 05/29/2019    LIP 55 (L) 1

## 2020-11-05 NOTE — PROGRESS NOTES
[facility] - [facility]    Progress Note      Subjective:     Lying comfortably - non conversational. Appears comfortable    Appetite ok.  Wife feeding him       Review of Systems:     Unable to obtain - non conversational     Objective:   Temp:  [98.6 cefTRIAXone Sodium (ROCEPHIN) 1 g in sodium chloride 0.9% 100 mL MBP/ADD-vantage, 1 g, Intravenous, Q24H    •  vancomycin HCl (FIRVANQ) 50 MG/ML oral solution 250 mg, 250 mg, Oral, Daily           Results:     Recent Labs   Lab 11/03/20  1554 11/04/20  053 (cpt=71045)    Result Date: 11/4/2020  CONCLUSION: No acute disease.      Dictated by (CST): Daphney Allen MD on 11/04/2020 at 9:06 AM     Finalized by (CST): Daphney Allen MD on 11/04/2020 at 9:08 AM          Xr Chest Ap Portable  (cpt=

## 2020-11-05 NOTE — PLAN OF CARE
Problem: Patient/Family Goals  Goal: Patient/Family Long Term Goal  Description: Patient's Long Term Goal: Go back to rehab     Interventions:  - PT/OT/SLP  - Medications as ordered  - IV abx    - See additional Care Plan goals for specific interventions wound bed, drain sites and surrounding tissue  - Implement wound care per orders  - Initiate isolation precautions as appropriate  - Initiate Pressure Ulcer prevention bundle as indicated  Outcome: Progressing    Pt.more alert today,would occasionally say

## 2020-11-05 NOTE — SLP NOTE
SPEECH DAILY NOTE - INPATIENT    ASSESSMENT & PLAN   ASSESSMENT      PPE REQUIRED. THIS SLP WORE GLOVES AND DROPLET MASK. HANDS SANITIZED/WASHED UPON ENTRANCE/EXIT. Pt with inconsistent levels of alertness and generalized weakness noted.  Pt afebrile a straw  Medication Administration Recommendations: Crushed in puree    Patient Experiencing Pain: No                Discharge Recommendations  Discharge Recommendations/Plan: Undetermined    Treatment Plan  Treatment Plan/Recommendations: Aspiration precaut

## 2020-11-06 NOTE — PROGRESS NOTES
INFECTIOUS DISEASE PROGRESS NOTE  [facility] PRO[facility]ESS [facility]    Merlin  Patient Status:  Inpatient    1940 MRN [id number]   Location [facility] 3W/SW Attending Fiona Denise MD   Hosp Day # 3 CITLALLI Larson Pseudobulbar palsy (HCC)     Bladder stone     Severe sepsis (HCC)     MARIANO (acute kidney injury) ([facility] [address]     Hypernatremia     Acute renal insufficiency     Staphylococcal sepsis (HCC)     Hydronephrosis of right kidney     Impaction of colon ([facility] [address]     Ac

## 2020-11-06 NOTE — PLAN OF CARE
No acute changes overnight. Patient will occasionally say a word when talked to. Suprapubic catheter in place and draining. IVF running.      Problem: Patient Centered Care  Goal: Patient preferences are identified and integrated in the patient's plan of ca Encourage toileting schedule  Outcome: Progressing     Problem: GENITOURINARY - ADULT  Goal: Absence of urinary retention  Description: INTERVENTIONS:  - Assess patient’s ability to void and empty bladder  - Monitor intake/output and perform bladder scan a

## 2020-11-06 NOTE — PROGRESS NOTES
Lalo Hardy is a 78year old male. HPI:   Patient presents with:  Cath Tube Problem      Patient is a 78year old male with a past medical history of BPH, chronic clark, HTN, gout, and parkinsons.   History of chronic urinary retention requiring clark discomfort even though 1 L drained off bladder and patient came with Azul catheter in place; has been started on tamsulosin in the emergency room; during office consult, prostate only 1-2+ enlarged; the abnormal renal ultrasound 9/27/17   moderate right h induced traumatic hypospadias slightly proximal to the coronal sulcus; On COLLIN, prostate 1-2+ enlarged, no palpable nodules or indurations; Azul catheter changed;continue finasteride 5 mg daily  8/11/2019 Office visit with me;  On PE, Traumatic hypospadias History: Social History    Tobacco Use      Smoking status: Former Smoker        Quit date: 1962        Years since quittin.2      Smokeless tobacco: Never Used    Alcohol use: No      Alcohol/week: 0.0 standard drinks    Drug use: No       Medic nodules.            LABORATORIES    11/3/20 urine culture > 100,000 staph species, not aureus  11/3/20  Blood culture Staphylococcus   species, not aureus      Recent Labs     04/18/18  1431   PSA 0.3       Renal Labs (last three years):    Recent Labs present- probable contamination. 10-50,000 cfu/ml Multiple species present-probable contamination. >100,000 CFU/ML Citrobacter freundii*  50,000-99,000 CFU/ML Enterococcus species not VRE* 10-50,000 cfu/ml Multiple species present-probable contamination. renal cysts. Remote. FINALIZED BY (CST): Vlad Hdz MD ON 11/05/2020 AT 3:17 PM                     11/3/2020     CT ABDOMEN+PELVIS(CPT=74176)   CONCLUSION:   Examination limited by motion. Suprapubic catheter within the urinary bladder.   Intr suprapubic tube replacement on 11/2/20 ; cathter occluded; Changed in [address] [place] [address] ER 11/3/20; catheter draining well     5. Stool impaction--receiving MiraLAX twice daily      RECOMMENDATIONS AND TREATMENT PLAN ---    1.   Agree with continuing IV vancomycin    2

## 2020-11-06 NOTE — PLAN OF CARE
IV antibiotics. Q2 turns. Wife was at bedside. Problem: Patient Centered Care  Goal: Patient preferences are identified and integrated in the patient's plan of care  Description: Interventions:  - What would you like us to know as we care for you?  Pt.o ADULT  Goal: Absence of urinary retention  Description: INTERVENTIONS:  - Assess patient’s ability to void and empty bladder  - Monitor intake/output and perform bladder scan as needed  - Follow urinary retention protocol/standard of care  - Consider colla

## 2020-11-06 NOTE — CM/SW NOTE
Met with pt's spouse, Sadiq Schmidt, at bedside to follow up on discharge plans. Not[facility] that [facility]. Confirmed with wife that she is okay with the pt returning there upon discharge. She is agreeable. Updates sent in Aidin.  Notified

## 2020-11-06 NOTE — PROGRESS NOTES
[facility]    Progress Note    Gauri Segundo Patient Status:  Inpatient    1940 MRN [id number]   Location [facility] 3W/SW Attending Maria Del Carmen Muhammad MD   Hosp Day # 3 PCP Cathryn Oppenheim, MD        Subjective:     Unable to pe 11/05/2020    BUN 36 (H) 11/05/2020     (H) 11/05/2020    K 3.8 11/06/2020     (H) 11/05/2020    CO2 30.0 11/05/2020     (H) 11/05/2020    CA 8.7 11/05/2020    ALB 3.2 (L) 11/03/2020    ALKPHO 63 11/03/2020    BILT 0.5 11/03/2020    TP 7

## 2020-11-06 NOTE — PROGRESS NOTES
[facility]    Progress Note      Subjective:     Patient more awake today and answers questions in one word    Denies any sob or pain     Review of Systems:     Limited - see above    Objective:   Temp:  [98.5 °F (36.9 °C)-99 °F (37.2 °C) in sodium chloride 0.9% 100 mL MBP/ADD-vantage, 1 g, Intravenous, Q24H    •  vancomycin HCl (FIRVANQ) 50 MG/ML oral solution 250 mg, 250 mg, Oral, Daily           Results:     Recent Labs   Lab 11/03/20  1554 11/04/20  0531 11/05/20  0524   RBC 5.12 4.58 4 Na improved- 153 -> 148 meq/l  - poor oral intake and was on diuretics  - on to D5 at 75 cc/hr -> reduce to 50 cc/hr  - encourage po intake   - repeat in am   - I/os and daily weight    3.  Sepsis/ bacteremia  - secondary to UTI/pyelonephritis   - urine and

## 2020-11-06 NOTE — SLP NOTE
SPEECH DAILY NOTE - INPATIENT    ASSESSMENT & PLAN   ASSESSMENT    PPE: DROPLET MASK AND GLOVES. HAND SANITIZED UPON ENTRANCE/EXIT. Pt with increased alertness on this date. Afebrile and on room air. O2 saturation at 96% throughout tx session.  Pt seen Aspiration precautions; Dysphagia therapy    Interdisciplinary Communication: Discussed with RN  Recommendations posted at bedside           Goal #1 The patient will tolerate CHOPPED consistency and NECTAR THICK liquids without overt signs or symptoms of as

## 2020-11-07 NOTE — PROGRESS NOTES
Gaston Araiza is a 78year old male. HPI:   Patient presents with:  Cath Tube Problem      Patient is a 78year old male with a past medical history of BPH, chronic clark, HTN, gout, and parkinsons.   History of chronic urinary retention requiring clark discomfort even though 1 L drained off bladder and patient came with Azul catheter in place; has been started on tamsulosin in the emergency room; during office consult, prostate only 1-2+ enlarged; the abnormal renal ultrasound 9/27/17   moderate right h induced traumatic hypospadias slightly proximal to the coronal sulcus; On COLLIN, prostate 1-2+ enlarged, no palpable nodules or indurations; Azul catheter changed;continue finasteride 5 mg daily  8/11/2019 Office visit with me;  On PE, Traumatic hypospadias History: Social History    Tobacco Use      Smoking status: Former Smoker        Quit date: 1962        Years since quittin.2      Smokeless tobacco: Never Used    Alcohol use: No      Alcohol/week: 0.0 standard drinks    Drug use: No       Medic Staphylococcus hominis      Recent Labs     04/18/18  1431   PSA 0.3       Renal Labs (last three years):    Recent Labs     12/04/17  1206 12/05/17  0553 12/09/17  0539 12/11/17  0553 04/18/18  1431 08/20/18  1012 11/02/18  2105 01/18/19  1118 06/05/20  1 >100,000 CFU/ML Citrobacter freundii*  50,000-99,000 CFU/ML Enterococcus species not VRE* 10-50,000 cfu/ml Multiple species present-probable contamination. Enterococcus species* 10-50,000 cfu/ml Multiple species present-probable contamination.  >100,000 CF evidence of right renal calculus. Suspected mildly asymmetric right perinephric stranding. Findings may reflect ascending urinary tract infection or pyelonephritis. Advise clinical correlation.   Recently passed calculus is also in the differential.  Col catheter    4. To make appointment to come to our office in 3 to 4 weeks to have suprapubic tube changed; we will plan to obtain urine culture at the time of catheter change; next suprapubic tube–attempt 16 [place] size.     5.   Dr. Lukas Evans from our E

## 2020-11-07 NOTE — TELEPHONE ENCOUNTER
Yohannes Thomas this patient's wife was concerned about transportation to your office for the SP change in 3-4 weeks.  I told her you would likely want to make sure the SP tube is changed in your office this next time rather than try to do this at the [facility]

## 2020-11-07 NOTE — OCCUPATIONAL THERAPY NOTE
OCCUPATIONAL THERAPY EVALUATION - INPATIENT     Room Number: 321/321-A  Evaluation Date: 11/7/2020  Type of Evaluation: Initial  Presenting Problem: AMS, sepsis, Suprapubic catheter not draining.      Physician Order: IP Consult to Occupational Therapy  Megha Delacruz subacute rehab to maximize indep with ADL and functional transfers. DISCHARGE RECOMMENDATIONS  OT Discharge Recommendations: Sub-acute rehabilitation  OT Device Recommendations: TBD    PLAN  OT Treatment Plan: Balance activities; ADL training;Functiona bathi/dressing)     Toilet and Equipment: Standard height toilet  Shower/Tub and Equipment: Walk-in shower; Shower chair  Other Equipment: (Wheelchair, lift chair)       Hand Dominance: Right  Drives: No  Patient Regularly Uses: Glasses    Stairs in Home: O and taking off regular upper body clothing?: A Lot  -   Taking care of personal grooming such as brushing teeth?: A Lot  -   Eating meals?: A Lot    AM-PAC Score:  Score: 9  Approx Degree of Impairment: 79.59%  Standardized Score (AM-PAC Scale): 25.33  CMS

## 2020-11-07 NOTE — PLAN OF CARE
Q2 turned pt. Midline put in today. Dressing and wound care given. IV abx.      Problem: Patient Centered Care  Goal: Patient preferences are identified and integrated in the patient's plan of care  Description: Interventions:  - What would you like us to k Problem: GENITOURINARY - ADULT  Goal: Absence of urinary retention  Description: INTERVENTIONS:  - Assess patient’s ability to void and empty bladder  - Monitor intake/output and perform bladder scan as needed  - Follow urinary retention protocol/standar

## 2020-11-07 NOTE — PHYSICAL THERAPY NOTE
PHYSICAL THERAPY EVALUATION - INPATIENT     Room Number: 321/321-A  Evaluation Date: 11/7/2020  Type of Evaluation: Initial   Physician Order: PT Eval and Treat    Presenting Problem: AMS, sepsis  Reason for Therapy: Mobility Dysfunction and Discharge Ashley sepsis [facility])  Active Problems:    Difficulty walking    Balance problem    Acute cystitis without hematuria    Hydronephrosis    Hypernatremia    Acute renal insufficiency    Staphylococcal sepsis (HCC)    Hydronephrosis of right kidney    Impaction of col risk    WEIGHT BEARING RESTRICTION  Weight Bearing Restriction: None                PAIN ASSESSMENT  Rating: Unable to rate  Location: back  Management Techniques:  Activity promotion    COGNITION  · Overall Cognitive Status:  Impaired    RANGE OF MOTION AN training    Patient End of Session: In bed;Needs met;Call light within reach;RN aware of session/findings; All patient questions and concerns addressed;SCDs in place; Alarm set; Family present    CURRENT GOALS    Goals to be met by: 11/20/2020  Patient Goal P

## 2020-11-07 NOTE — PLAN OF CARE
IVABX - LT; New new complaints; Plan for [address]     Problem: Patient/Family Goals  Goal: Patient/Family Long Term Goal  Description: Patient's Long Term Goal: Go back to rehab     Interventions:  - PT/OT/SLP  - Medications as ordered  - IV and/or skin breakdown  - Initiate interventions, skin care algorithm/standards of care as needed  Outcome: Progressing  Goal: Incision(s), wounds(s) or drain site(s) healing without S/S of infection  Description: INTERVENTIONS:  - Assess and document risk

## 2020-11-07 NOTE — SLP NOTE
SPEECH DAILY NOTE - INPATIENT    ASSESSMENT & PLAN   ASSESSMENT  Pt seen for dysphagia tx to assess tolerance with recommended diet, trials of upgraded liquids, ensure proper utilization of aspiration precautions and provide pt/family education.   Patient s RN  Recommendations posted at bedside             GOALS   Goal #1 The patient will tolerate CHOPPED consistency and NECTAR THICK liquids without overt signs or symptoms of aspiration with 100 % accuracy over 2-3 session(s).      No CSA on swallows of choppe 3    If you have any questions, please contact Barron Pham

## 2020-11-07 NOTE — PROGRESS NOTES
[place] [facility] - [facility]    Progress Note    Jenrich Decent Patient Status:  Inpatient    1940 MRN [id number]   Location [facility] 3W/SW Attending My Taylor MD   Hosp Day # 4 PCP Nicole Mcqueen MD        Subjective:   Pt is more alert discharge tab)         Agusto Cleaning MD, [address]  Urologist  [facility]  Office: [phone]

## 2020-11-07 NOTE — PROGRESS NOTES
[facility] dosing service    Follow-up Pharmacokinetic Consult for Vancomycin Dosing     Adela Brandt is a 78year old patient who is being treated for bacteremia. Patient is on day 4 of Vancomycin and is currently receiving 1 gm IV Q 24 hours.   Goal Pharmacy Extension: [phone]

## 2020-11-07 NOTE — PROGRESS NOTES
[facility]    Progress Note    Luísarmida Lopez Patient Status:  Inpatient    1940 MRN [id number]   Location [facility] 3W/SW Attending Ernesto Kinsey MD   Hosp Day # 4 PCP Chau Fernandez MD        Subjective:   Patient seen a discharge back to [facility] in the morning            Results:     Lab Results   Component Value Date    WBC 10.7 11/06/2020    HGB 12.3 (L) 11/06/2020    HCT 40.1 11/06/2020    .0 11/06/2020    CREATSERUM 0.77 11/07/2020    BUN 21 (H) 11/07/20

## 2020-11-08 NOTE — SLP NOTE
SPEECH DAILY NOTE - INPATIENT    ASSESSMENT & PLAN   ASSESSMENT  PPE REQUIRED. THIS SLP WORE GOGGLES, GLOVES, AND DROPLET MASK. HANDS SANITIZED/WASHED UPON ENTRANCE/EXIT.      RN contacted SLP as patient with OVERT CSA with thin liquids during AM meal. RN r by (CST): Selin Allen MD on 11/04/2020 at 9:08 AM          Diet Recommendations - Solids: Mechanical soft ground  Diet Recommendations - Liquid: Nectar thick  1:1 FEEDING ASSISTANCE     Compensatory Strategies Recommended: No straws; Slow rate;L downgrade from thin liquids. Goal #2 The patient/family/caregiver will demonstrate understanding and implementation of aspiration precautions and swallow strategies independently over 3 session(s). No family available in today's session.  Education pr

## 2020-11-08 NOTE — PROGRESS NOTES
[facility]    Progress Note    Renetta Love Patient Status:  Inpatient    1940 MRN [id number]   Location [facility] 3W/SW Attending Eli Pulliam MD   Hosp Day # 5 PCP Mena Bruce MD        Subjective:   He is doing ok aspiration            Results:     Lab Results   Component Value Date    WBC 10.7 11/06/2020    HGB 12.3 (L) 11/06/2020    HCT 40.1 11/06/2020    .0 11/06/2020    CREATSERUM 0.77 11/08/2020    BUN 19 (H) 11/08/2020     11/08/2020    K 3.6 11/0

## 2020-11-08 NOTE — PLAN OF CARE
Plan for LT IVABX; speech reeval today- Recommend Nectar thick liquids; per Primary MD, pt medically stable for dc to [facility] Monday; wife aware of plan.      Problem: Patient/Family Goals  Goal: Patient/Family Long Term Goal  Description: Patient's integrity  - Assess and document dressing/incision, wound bed, drain sites and surrounding tissue  - Implement wound care per orders  - Initiate isolation precautions as appropriate  - Initiate Pressure Ulcer prevention bundle as indicated  Outcome: Delfino

## 2020-11-08 NOTE — PLAN OF CARE
Problem: SAFETY ADULT - FALL  Goal: Free from fall injury  Description: INTERVENTIONS:  - Assess pt frequently for physical needs  - Identify cognitive and physical deficits and behaviors that affect risk of falls.   - [place] fall precautions as indica complete, pt responds to commands but is unable to verbally respond, IV antibiotics, 5% dextrose infusion, PT/OT, plan is to be discharged back to [facility]. Will continue to monitor.

## 2020-11-08 NOTE — TELEPHONE ENCOUNTER
Osborn Duverney,  I agree that since recently change suprapubic tube did not seem to be draining appropriately several hours later, it would be lopez for me to be present for the next suprapubic catheter change 3 to 4 weeks from now in our office, to make sure suprapu

## 2020-11-08 NOTE — PROGRESS NOTES
[facility]    Progress Note    Erendira Yepez Patient Status:  Inpatient    1940 MRN [id number]   Location [facility] 3W/SW Attending Bonifacio Lamas MD   Hosp Day # 4 PCP Ronal Orta MD       Subjective:   Erendira Yepez is all extremities good strength  no deformities  Extremities: no edema, cyanosis  Neurological: Poor speech tremulous    Results:     Laboratory Data:  Lab Results   Component Value Date    WBC 10.7 11/06/2020    HGB 12.3 (L) 11/06/2020    HCT 40.1 11/06/202

## 2020-11-08 NOTE — TELEPHONE ENCOUNTER
Urology nurses, please call patient's wife Sage Patterson----  Patient is likely to go home this weekend or very soon thereafter status post complication after our office changed his suprapubic catheter.   Wife wants to know if suprapubic catheter can be changed at

## 2020-11-08 NOTE — PROGRESS NOTES
[facility]    Progress Note    Renetta Love Patient Status:  Inpatient    1940 MRN [id number]   Location [facility] 3W/SW Attending Eli Pulliam MD   Hosp Day # 5 PCP Mena Bruce MD       Subjective:   Renetta Love is no abnormal bruising noted  Back/Spine: no abnormalities noted  Musculoskeletal: full ROM all extremities good strength  no deformities  Extremities: no edema, cyanosis  Neurological: Parkinsonian    Results:     Laboratory Data:  Lab Results   Component V

## 2020-11-09 NOTE — PLAN OF CARE
Patient alert, oriented to self, answers in one-word answers. Blood cultures drawn today, Vancomycin changed to IV cubicin. Patient turned frequently with pillow support. Patient's wife updated on POC and discharge plan.    Plan: DC to [facility] Assess and document risk factors for pressure ulcer development  - Assess and document skin integrity  - Monitor for areas of redness and/or skin breakdown  - Initiate interventions, skin care algorithm/standards of care as needed  11/9/2020 1317 by [employer] PLANNING  Goal: Discharge to home or other facility with appropriate resources  Description: INTERVENTIONS:  - Identify barriers to discharge w/pt and caregiver  - Include patient/family/discharge partner in discharge planning  - Arrange for needed dischar

## 2020-11-09 NOTE — PHYSICAL THERAPY NOTE
PHYSICAL THERAPY TREATMENT NOTE - INPATIENT     Room Number: 321/321-A       Presenting Problem: AMS, sepsis    Problem List  Principal Problem:    Severe sepsis ([address]  Active Problems:    Difficulty walking    Balance problem    Acute cystitis without hem transfer from bed to w/c with RW and mod A up until a month ago. DISCHARGE RECOMMENDATIONS  PT Discharge Recommendations: 24 hour care/supervision;Sub-acute rehabilitation     PLAN  PT Treatment Plan: Bed mobility; Body mechanics; Endurance; Energy conser THERAPEUTIC EXERCISES  Lower Extremity Knee extension; guided PROM     Position Supine       Patient End of Session: In bed;Needs met;Call light within reach;RN aware of session/findings; All patient questions and concerns addressed; Alarm set; Family pre

## 2020-11-09 NOTE — TELEPHONE ENCOUNTER
Urology nurses, please call patient's wife Rona----(copy to TIMOTHY Chan)  Patient is likely to go home this weekend or very soon thereafter status post complication after our office changed his suprapubic catheter.   Wife wants to know if suprap

## 2020-11-09 NOTE — CM/SW NOTE
1000:   RAQUEL was notified during nursing rounds pt may be medically stable for dc today back to [facility]. RAQUEL requested ISR/Ying to send updates to [facility], including therapy notes.      RAQUEL spoke to Lissette/Admissions [facility]) - she s

## 2020-11-09 NOTE — PROGRESS NOTES
[address] [facility]  Progress Note    Agustín Sepulveda Patient Status:  Inpatient    1940 MRN [id number]   Location [facility] 3W/SW Attending Marvell Soulier, MD   Hosp Day # 6 PCP MD Yasir Gonzalez office will coordinate this.   Urine culture 6-10 days prior to appt which can be completed at [address] with RN and spouse Stephanie Reza      Results:     Lab Results   Component Value Date    WBC 10.7 11/06/2020    HGB 12.3 (L) 11/06/2020

## 2020-11-09 NOTE — PROGRESS NOTES
[facility]    Progress Note    Hebronespinoza Espinoza Patient Status:  Inpatient    1940 MRN [id number]   Location [facility] 3W/SW Attending Yefri Gleason MD   Hosp Day # 6 PCP Justice Watt MD        Subjective:     Unable to pe CO2 24.0 11/08/2020    GLU 97 11/08/2020    CA 8.0 (L) 11/08/2020    ALB 2.5 (L) 11/07/2020    ALKPHO 63 11/03/2020    BILT 0.5 11/03/2020    TP 7.4 11/03/2020    AST 28 11/03/2020    ALT 18 11/03/2020    TSH 0.549 05/29/2019    LIP 55 (L) 11/03/2020

## 2020-11-09 NOTE — PROGRESS NOTES
INFECTIOUS DISEASE PROGRESS NOTE  [facility] - [facility] OF JOSE M ID PROGRESS NOTE    Climmie Learn Patient Status:  Inpatient    1940 MRN [id number]   Location [facility] 3W/SW Attending Cristiano Wong MD   Hosp Day # 6 CITLALLI Larson Hydronephrosis     Abnormal findings on diagnostic imaging of cardiovascular system     Hyponatremia     Hyperglycemia     Hematochezia     Age-related nuclear cataract of both eyes     Cardiomyopathy ([address]     Pseudobulbar palsy ([address]     Bladder stone

## 2020-11-09 NOTE — SLP NOTE
SPEECH DAILY NOTE - INPATIENT    ASSESSMENT & PLAN   ASSESSMENT  PPE REQUIRED. THIS THERAPIST WORE GLOVES, DROPLET MASK, AND GOGGLES FOR DURATION OF TX SESSION. HANDS WASHED UPON ENTRANCE/EXIT.     SLP f/u for ongoing dysphagia tx/meal assessment per recomm bites and sips  Medication Administration Recommendations: Crushed in puree    Patient Experiencing Pain: No       Discharge Recommendations  Discharge Recommendations/Plan: Undetermined    Treatment Plan  Treatment Plan/Recommendations: Aspiration precaut provided to RN. Thickener education remains at bedside. Will f/u x1.         In progress/updated   Ongoing thickener education    Goal #3 The patient will tolerate trial upgrade of REGULAR consistency and THIN liquids without overt signs or symptoms of aspi

## 2020-11-09 NOTE — PLAN OF CARE
Problem: Patient/Family Goals  Goal: Patient/Family Long Term Goal  Description: Patient's Long Term Goal: Go back to rehab     Interventions:  - PT/OT/SLP  - Medications as ordered  - IV abx    - See additional Care Plan goals for specific interventions as appropriate  - Initiate Pressure Ulcer prevention bundle as indicated  Outcome: Progressing    Pt. alert,responds to name,answers some simple questions. Repositioned q2 hr,incontinent care provided. Suprapubic catheter,left midline PICC maintained.  Plans

## 2020-11-09 NOTE — WOUND PROGRESS NOTE
WOUND CARE NOTE    History:  Past Medical History:   Diagnosis Date   • Age-related nuclear cataract of both eyes 3/13/2019   • Back pain    • Back problem     lower back spinal stenosis   • Cataract    • Chronic indwelling Azul catheter    • Enlarged p Vesta protective ointment  Frequency bid and prn incontinence         SUBJECTIVE   Patient awake    OBJECTIVE   RN Consult new  Reason for Consult multiple wounds      ASSESSMENT   Glen Score:  Glen Scale Score: 13    Chart Reviewed: yes    Wound(s):

## 2020-11-09 NOTE — TELEPHONE ENCOUNTER
I did speak with spouse shraddha regarding update today. Patient was seen in rounds. She is agreeable to f/u appt with PVK however I did not see any openings. I told her our office will be in touch with her regarding scheduling.   If we can please help arra

## 2020-11-10 NOTE — PROGRESS NOTES
[facility]  Progress Note    Ita Moura Patient Status:  Inpatient    1940 MRN [id number]   Location [facility] 3W/SW Attending Ingris Calhoun MD   Hosp Day # 7 PCP MD Donald Sun 3-4 for SPT exchange, my office will coordinate this.   Urine culture 6-10 days prior to appt which can be completed at [address] with RN and spouse Maria Guadalupe Guardado      Results:     Lab Results   Component Value Date    WBC 11.0 11/09/2020    HG

## 2020-11-10 NOTE — PLAN OF CARE
Problem: Patient/Family Goals  Goal: Patient/Family Long Term Goal  Description: Patient's Long Term Goal: Go back to rehab     Interventions:  - PT/OT/SLP  - Medications as ordered  - IV abx    - See additional Care Plan goals for specific interventions as appropriate  - Initiate Pressure Ulcer prevention bundle as indicated  Outcome: Progressing     Problem: PAIN - ADULT  Goal: Verbalizes/displays adequate comfort level or patient's stated pain goal  Description: INTERVENTIONS:  - Encourage pt to monitor ability or social support system  Outcome: Progressing    Pt. alert,slept soundly during the night. Repositioned q2 hr,incontinent care provided. IV abx continued. Plans for transfer to [facility] today. Bed alarm activated. Will continue to monitor pt.

## 2020-11-10 NOTE — PROGRESS NOTES
[facility]    Progress Note    Scotty Millan Patient Status:  Inpatient    1940 MRN [id number]   Location [facility] 3W/SW Attending Vika Martins MD   Hosp Day # 7 PCP Jani Gonzalez MD        Subjective:     Unable to pe BUN 19 (H) 11/08/2020     11/08/2020    K 4.5 11/08/2020     11/08/2020    CO2 24.0 11/08/2020    GLU 97 11/08/2020    CA 8.0 (L) 11/08/2020    ALB 2.5 (L) 11/07/2020    ALKPHO 63 11/03/2020    BILT 0.5 11/03/2020    TP 7.4 11/03/2020    AST 28

## 2020-11-10 NOTE — CM/SW NOTE
Received notice from ID and pt's RN that pt is cleared from ID standpoint - pt will need 4 weeks of IV daptomycin at d/c.     RAQUEL spoke w/ Shakira Oropeza at [facility], due to cost, they cannot accept pt on IV daptomycin.      RAQUEL updated pt's RN and ID SUELLEN Fuchs

## 2020-11-10 NOTE — SLP NOTE
SPEECH DAILY NOTE - INPATIENT    ASSESSMENT & PLAN   ASSESSMENT  PPE REQUIRED. THIS THERAPIST WORE GLOVES, DROPLET MASK, AND GOGGLES FOR DURATION OF TX SESSION. HANDS WASHED UPON ENTRANCE/EXIT.     Pt seen for swallowing therapy to monitor swallowing tolera in the room and on orange sheet. At the end of the therapy session patient with intermittent LUKAS. Pt unable to complete dysphagia exercises today secondary to decreased LUKAS. PLAN: SLP to sign off case.   Continuation of speech/swallowing therapy service wife, Maria R Bassett, acknowledges understanding of education. GOAL MET   Goal #3 The patient will tolerate trial upgrade of REGULAR consistency and THIN liquids without overt signs or symptoms of aspiration with 85 % accuracy over 2-3 session(s).   Discontin

## 2020-11-10 NOTE — PROGRESS NOTES
INFECTIOUS DISEASE PROGRESS NOTE  [facility] JOSE M ID PROGRESS NOTE    Jack Hussein Patient Status:  Inpatient    1940 MRN [id number]   Location [facility] 3W/SW Attending Danish Smyth MD   Hosp Day # 7 CITLALLI Larson Fall as cause of accidental injury in home as place of occurrence     Balanitis     Parkinson disease [address] [address] [facility]     Hydronephrosis     Abnormal findings on diagnostic imaging of cardiovascular system     Hyponatremia     Hyperglycemia     Hematochezia     A

## 2020-11-11 NOTE — CM/SW NOTE
RAQUEL spoke to Lissette/Admissions from [facility] (x5) she continues to indicate that Daptomycin is an abx they cannot provide at facility. SW spoke to [facility], they are able to accept pt and can do dapto at facility.  Paul Mccartney will call wife to discuss

## 2020-11-11 NOTE — PLAN OF CARE
Pt is alert to self, minimally verbal. Denies any pain. Nectar thick fluids 1800ml restriction. Ground diet. Accucheck ACHS. Remote tele in place. Suprapubic catheter draining. Q2 turns. Left arm precautions in place due to midline.  Prompt incontinence car without S/S of infection  Description: INTERVENTIONS:  - Assess and document risk factors for pressure ulcer development  - Assess and document skin integrity  - Assess and document dressing/incision, wound bed, drain sites and surrounding tissue  - Implem Complete POLST form as appropriate  - Assess patient's ability to be responsible for managing their own health  - Refer to Case Management Department for coordinating discharge planning if the patient needs post-hospital services based on physician/LIP ord

## 2020-11-11 NOTE — PROGRESS NOTES
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

## 2020-11-11 NOTE — PLAN OF CARE
Pt alert to self. VSS. Patient is nonverbal and has a hx of parkinsons. Wife at bedside earlier this afternoon. Patient is on a russell mattress. Prompt incontinent care provided. Plans for discharge to [facility] or [facility] for long term IV antibiotics.  Wife aw site(s) healing without S/S of infection  Description: INTERVENTIONS:  - Assess and document risk factors for pressure ulcer development  - Assess and document skin integrity  - Assess and document dressing/incision, wound bed, drain sites and surrounding partner  - Complete POLST form as appropriate  - Assess patient's ability to be responsible for managing their own health  - Refer to Case Management Department for coordinating discharge planning if the patient needs post-hospital services based on physic

## 2020-11-11 NOTE — PROGRESS NOTES
[facility]acy Note: Route Optimization for Acetaminophen (OFIRMEV)    Patient is currently on Acetaminophen (OFIRMEV) 1000 mg IV every 6 hours.    The patient meets the criteria to convert to the oral equivalent as established by the IV to Oral conversion pr

## 2020-11-11 NOTE — PROGRESS NOTES
[facility]    Progress Note    Agustín Rubiodain Patient Status:  Inpatient    1940 MRN [id number]   Location [facility] 4W/SW/SE Attending Marvell Soulier, MD   Hosp Day # 8 PCP Benoit Tena MD        Subjective:     Unable to 11/09/2020    CREATSERUM 0.77 11/08/2020    BUN 19 (H) 11/08/2020     11/08/2020    K 4.5 11/08/2020     11/08/2020    CO2 24.0 11/08/2020    GLU 97 11/08/2020    CA 8.0 (L) 11/08/2020    ALB 2.5 (L) 11/07/2020    ALKPHO 63 11/03/2020    BILT 0

## 2020-11-11 NOTE — PROGRESS NOTES
Wife Lorre Gowers notified of room change to the 4th floor. RN to RN report given prior to transfer. Patient stable.

## 2020-11-11 NOTE — CM/SW NOTE
CM called and left message for pt's wife regarding dc planning. Pt has been accepted to [facility] and bed is available. Per Karen/[facility] liaison dapto is no longer expensive and about $75 a day.   LORELEI [employer] place via aidin with dapto costs and pt

## 2020-11-12 NOTE — DIETARY NOTE
ADULT NUTRITION INITIAL ASSESSMENT    Pt is at moderate nutrition risk. Pt meets moderate malnutrition criteria.       CRITERIA FOR MALNUTRITION DIAGNOSIS:  Criteria for non-severe malnutrition diagnosis: acute illness/injury related to wt loss greater salvatore History:   Diagnosis Date   • Age-related nuclear cataract of both eyes 3/13/2019   • Back pain    • Back problem     lower back spinal stenosis   • Cataract    • Chronic indwelling Azul catheter    • Enlarged prostate    • Essential hypertension    • Gou carbidopa-levodopa  2 tablet Oral BID   • vancomycin HCl  250 mg Oral Daily     LABS: noted  No results for input(s): GLU, BUN, CREATSERUM, CA, MG, NA, K, CL, CO2, PHOS, OSMOCALC in the last 84 hours.   NUTRITION RELATED PHYSICAL FINDINGS:  - Body Fat/Muscl

## 2020-11-12 NOTE — PHYSICAL THERAPY NOTE
PHYSICAL THERAPY TREATMENT NOTE - INPATIENT     Room Number: 468/468-A       Presenting Problem: AMS, sepsis    Problem List  Principal Problem:    Severe sepsis ([address]  Active Problems:    Difficulty walking    Balance problem    Acute cystitis without hem Static Sitting: Poor  Dynamic Sitting: Poor -           Static Standing: Not tested  Dynamic Standing: Not tested    ACTIVITY TOLERANCE                         O2 WALK                  AM-PAC '6-Clicks' INPATIENT SHORT FORM - BASIC MOBILITY  How much d Current Status  in progress   Goal #6     Goal #6  Current Status

## 2020-11-12 NOTE — PLAN OF CARE
Problem: Patient/Family Goals  Goal: Patient/Family Long Term Goal  Description: Patient's Long Term Goal: Go back to rehab     Interventions:  - PT/OT/SLP  - Medications as ordered  - IV abx    - See additional Care Plan goals for specific interventions precautions as appropriate  - Initiate Pressure Ulcer prevention bundle as indicated  Outcome: Progressing     Problem: PAIN - ADULT  Goal: Verbalizes/displays adequate comfort level or patient's stated pain goal  Description: INTERVENTIONS:  - Encourage p cognitive ability or social support system  Outcome: Progressing     Patient alert/ oriented to self. Speaks minimal words. On cardiac/ground diet with nectar thick liquids. No straw. Fluid restriction 1800 mL. Suprapubic catheter in place.  Left arm midlin

## 2020-11-12 NOTE — CM/SW NOTE
RAQUEL followed up on DC planning. RAQUEL called Maria R Bassett ([phone]), hospital liaison for [address] - no answer and unable to leave . RAQUEL to call back requesting update on pt's acceptance status.      RAQUEL extended acceptance deadline in [place] [id number]

## 2020-11-12 NOTE — PROGRESS NOTES
[facility]    Progress Note    Jack Hussein Patient Status:  Inpatient    1940 MRN [id number]   Location [facility] 4W/SW/SE Attending Danish Smyth MD   Hosp Day # 9 PCP Masood Dickson MD        Subjective:     Unable to 11/09/2020    HCT 39.0 11/09/2020    .0 11/09/2020    CREATSERUM 0.77 11/08/2020    BUN 19 (H) 11/08/2020     11/08/2020    K 4.5 11/08/2020     11/08/2020    CO2 24.0 11/08/2020    GLU 97 11/08/2020    CA 8.0 (L) 11/08/2020    ALB 2.5 (

## 2020-11-12 NOTE — OCCUPATIONAL THERAPY NOTE
OCCUPATIONAL THERAPY TREATMENT NOTE - INPATIENT        Room Number: 468/468-A  Presenting Problem: AMS, sepsis, Suprapubic catheter not draining.      Problem List  Principal Problem:    Severe sepsis [facility])  Active Problems:    Difficulty walking    Balance ASSESSMENT  AM-PAC ‘6-Clicks’ Inpatient Daily Activity Short Form  How much help from another person does the patient currently need…  -   Putting on and taking off regular lower body clothing?: Total  -   Bathing (including washing, rinsing, drying)?: Tot

## 2020-11-13 NOTE — CM/SW NOTE
RAQUEL confirmed with RN that pt is medically ready for discharge today. RAQUEL called and spoke with Sahara Herman from [address] [place] [address] to arrange a time for discharge. RN is aware of discharge time and location.  RAQUEL called and notified pt's wife regarding D

## 2020-11-13 NOTE — PLAN OF CARE
Suprapubic catheter in place. Receiving Daptomycin. Patient is on  mattress. Heel boots in place. Turned and repositioned as tolerated. Fall precautions in place. Call light within reach. Wife at bedside.  Plan to discharge to [facility] potentially to

## 2020-11-13 NOTE — DISCHARGE SUMMARY
DISCHARGE SUMMARY     Autumn Cherry Patient Status:  Inpatient    1940 MRN [id number]   Location [facility] 4W/SW/SE Attending Jada Simmons MD   Hosp Day # 10 PCP Emiliano Orellana MD     Date of Admission: 11/3/2020  Date of Discharge:  rhythm  Abdominal: soft, non-tender; bowel sounds normal; no masses,  no organomegaly  Extremities: extremities normal, atraumatic, no cyanosis or edema    Procedures during hospitalization:   •     Incidental or significant findings and recommendations (b daily. Refills: 0     valsartan 40 MG Tabs  Commonly known as: DIOVAN      Take 40 mg by mouth daily. Refills: 0     Vitamin D3 25 MCG (1000 UT) Tabs  Commonly known as: VITAMIN D3      Take 2,000 Units by mouth TID (Nitrates).    Refills: 0           W soon as possible for a visit in 2 weeks        Vital signs:  Temp:  [98.1 °F (36.7 °C)-99.1 °F (37.3 °C)] 99.1 °F (37.3 °C)  Pulse:  [76-83] 76  Resp:  [18-20] 18  BP: (656-152)/(59-91) 144/72    ------------------------------------------------------------

## 2020-11-24 NOTE — TELEPHONE ENCOUNTER
S/W pt's spouse who called back about the appt pt needs. She informed that pt is in the [facility] rehab home in [address] [place] [address] room # 118 at [phone].  I explained that PVK gave orders for pt to come in for the cath change and have a n/v 1 week prio

## 2020-11-24 NOTE — TELEPHONE ENCOUNTER
I LMTCB at the cell # and I also tried the home # but that did not have a VM option to LM. I placed pt in an appt slot to see PVK for his SP tube cath change on 11/30 at 1:20 pm. Pt needs to have a cathed specimen from the [facility] for C&S.  Caesar Knight

## 2020-11-24 NOTE — TELEPHONE ENCOUNTER
I called the [facility] home and s/w the nurse Roxana Burgess who was not caring for this pt but will pass on the orders and instructions to pt's nurse Aleida Montes.  I arranged an appt for pt to come to the office on a stretcher on 11/30 at 1:20 pm for SP tube cat

## 2020-11-27 NOTE — TELEPHONE ENCOUNTER
S/W the nurse Mehnaz Davies at the [facility] home and she informed that they did collect a urine sample from [facility] and that it showed no growth of bacteria. I asked that she fax me those results and I gave her our fax #.  Pt has an appt on Monday for S

## 2020-11-28 NOTE — TELEPHONE ENCOUNTER
Per pt's spouse, patient has passed and wanting to let Dr. Nani Ngo know.  Offered condolences, please advise

## 2021-04-26 NOTE — LETTER
INDICATION:

CHEST PAIN.



COMPARISON:

Comparison chest x-ray March 26, 2021.



TECHNIQUE:

Portable upright AP chest radiograph.



FINDINGS:

Lungs are well inflated and free of infiltrate.  Pleural angles are sharp.  Median

sternotomy wires are noted.  Heart size is borderline unchanged.  Patient is status

post midthoracic spine vertebroplasty.  EKG electrodes are seen.  There is

considerable sclerosis of the humeral heads bilaterally and diffuse osteopenia is

noted.  No acute bony abnormality..



IMPRESSION:

Borderline heart prior sternotomy.  No acute cardiopulmonary disease seen..





<Electronically signed by Edinson Morales > 04/26/21 1000 Patient Name: Taya Marinelli  YOB: 1940          MRN number:  [id number]  Date:  7/11/2019  Referring Physician: Jocelin Velez     ADULT SWALLOWING EVALUATION:    Referring Physician: Dr. Camacho Aponte  Parkinson's Diease  Date of Service: 7/11/201 Current Swallowing Diagnosis: Oropharyngeal Dysphagia   Swallowing History:   BSSE 12/6/17   \"Pt seen sitting upright in bed for all PO trials for BSSE. Pt with good oral acceptance and bilabial seal across all trials.  No anterior loss of bolus on any con reports coughing occurs when pt is reclined in his chair outside of the kitchen. Pt positioned upright 90 degrees at table for swallow assessment. Pt with good oral acceptance and bilabial seal across all trials.  No anterior spillage observed across the en impairments to monitor current diet consistencies, initiate dysphagia exercises, and VFSS as clinically warranted   Recommendations:   1. VFSS to objectively assess oropharyngeal function. Dr. Hidalgo 22 contacted via in-basket for VFSS orders.    2. SPEAK OUT X______________________________________________ Date________________  Certification From: 1/97/4507      To: 10/9/2019

## 2023-02-25 NOTE — PLAN OF CARE
VS stable. Denies any pain. Wife at bedside. Turn q2, complete care. Suprapubic catheter in place draining clear, yellow, fluid. Bed alarm on. Frequent rounds done. Will continue to monitor.      Problem: Patient/Family Goals  Goal: Patient/Family Long Term development  - Assess and document skin integrity  - Assess and document dressing/incision, wound bed, drain sites and surrounding tissue  - Implement wound care per orders  - Initiate isolation precautions as appropriate  - Initiate Pressure Ulcer prevent PROGRESS NOTE:   Dharmesh Pyle MD  Available on teams    Patient is a 80y old  Male who presents with a chief complaint of infected decubitus ulcers (24 Feb 2023 07:23)    SUBJECTIVE / OVERNIGHT EVENTS:  Seen for follow up for infected decubitus ulcers   Unable to obtain due to dementia    ADDITIONAL REVIEW OF SYSTEMS:    MEDICATIONS  (STANDING):  amoxicillin  875 milliGRAM(s)/clavulanate 1 Tablet(s) Oral every 12 hours  apixaban 2.5 milliGRAM(s) Oral every 12 hours  bisacodyl Suppository 10 milliGRAM(s) Rectal once  donepezil 10 milliGRAM(s) Oral at bedtime  doxycycline monohydrate Capsule 100 milliGRAM(s) Oral every 12 hours  memantine 5 milliGRAM(s) Oral two times a day  valproic  acid Syrup 250 milliGRAM(s) Oral two times a day    MEDICATIONS  (PRN):  acetaminophen     Tablet .. 650 milliGRAM(s) Oral every 6 hours PRN Temp greater or equal to 38C (100.4F)      CAPILLARY BLOOD GLUCOSE        I&O's Summary      PHYSICAL EXAM:  Vital Signs Last 24 Hrs  T(C): 36.7 (26 Feb 2023 04:45), Max: 37.6 (25 Feb 2023 20:33)  T(F): 98.1 (26 Feb 2023 04:45), Max: 99.6 (25 Feb 2023 20:33)  HR: 94 (26 Feb 2023 04:45) (91 - 100)  BP: 113/69 (26 Feb 2023 04:45) (82/52 - 113/69)  BP(mean): --  RR: 18 (26 Feb 2023 04:45) (17 - 18)  SpO2: 98% (26 Feb 2023 04:45) (98% - 99%)    Parameters below as of 25 Feb 2023 20:33  Patient On (Oxygen Delivery Method): room air        GA :  demented, awake,  NAD   HEENT: PERRL   NECK: no JVD, no thyromegaly   CVS: S1 S2 no murmur no rubs no gallop  RESP: CTAB no wheeze, no rhonchi no rales  ABD: Soft, NT, ND, tympanic, no rebound or guarding   : No [place], No CVA tenderness   EXT; no pedal edema, no cyanosis  SKIN:  Right hip pressure ulcer, pressure ulcers sacral, left heel s/p debridement and dressings  Neuro: demented, unable to perform.     LABS:                        10.4   8.43  )-----------( 294      ( 23 Feb 2023 07:13 )             31.0     02-23    136  |  103  |  8<L>  ----------------------------<  95  4.6   |  27  |  0.6<L>    Ca    8.2<L>      23 Feb 2023 07:13    TPro  5.7<L>  /  Alb  1.9<L>  /  TBili  0.2  /  DBili  x   /  AST  33  /  ALT  16  /  AlkPhos  109  02-23                RADIOLOGY & ADDITIONAL TESTS:  Results Reviewed:   Imaging Personally Reviewed:  Electrocardiogram Personally Reviewed:    COORDINATION OF CARE:  Care Discussed with Consultants/Other Providers [Y/N]:  Prior or Outpatient Records Reviewed [Y/N]:   Refer to Case Management Department for coordinating discharge planning if the patient needs post-hospital services based on physician/LIP order or complex needs related to functional status, cognitive ability or social support system  Outcome: Progressing

## 2024-08-08 NOTE — PROGRESS NOTES
Assumed pt care at 0730. A&Ox4. VSS, afebrile. Room air, . No tele. Reports RLQ pain at a 4/10 currently. R AC PIV infusing 0.9NS @ 83 mL/hr. Regular diet, denies N/V. Voiding, up ad gabriel. IV Rocephin q24. Pt updated on POC.     Problem: Patient/Family Goals  Goal: Patient/Family Long Term Goal  Description: Patient's Long Term Goal: discharge  Interventions:  - coordinate with care team when appropriate for a safe discharge  - See additional Care Plan goals for specific interventions  Outcome: Progressing  Goal: Patient/Family Short Term Goal  Description: Patient's Short Term Goal: sleep  Interventions:   - provide adequate sleep/rest environment  - pain management  - nausea management  - See additional Care Plan goals for specific interventions  Outcome: Progressing     Problem: GASTROINTESTINAL - ADULT  Goal: Minimal or absence of nausea and vomiting  Description: INTERVENTIONS:  - Maintain adequate hydration with IV or PO as ordered and tolerated  - Nasogastric tube to low intermittent suction as ordered  - Evaluate effectiveness of ordered antiemetic medications  - Provide nonpharmacologic comfort measures as appropriate  - Advance diet as tolerated, if ordered  - Obtain nutritional consult as needed  - Evaluate fluid balance  Outcome: Progressing  Goal: Maintains or returns to baseline bowel function  Description: INTERVENTIONS:  - Assess bowel function  - Maintain adequate hydration with IV or PO as ordered and tolerated  - Evaluate effectiveness of GI medications  - Encourage mobilization and activity  - Obtain nutritional consult as needed  - Establish a toileting routine/schedule  - Consider collaborating with pharmacy to review patient's medication profile  Outcome: Progressing  Goal: Achieves appropriate nutritional intake (bariatric)  Description: INTERVENTIONS:  - Monitor for over-consumption  - Identify factors contributing to increased intake, treat as appropriate  - Monitor I&O, WT and lab  Thanh Clifton [phone] for post hospital follow up, [facility] contact information provided. values  - Obtain nutritional consult as needed  - Evaluate psychosocial factors contributing to over-consumption  Outcome: Progressing     Problem: PAIN - ADULT  Goal: Verbalizes/displays adequate comfort level or patient's stated pain goal  Description: INTERVENTIONS:  - Encourage pt to monitor pain and request assistance  - Assess pain using appropriate pain scale  - Administer analgesics based on type and severity of pain and evaluate response  - Implement non-pharmacological measures as appropriate and evaluate response  - Consider cultural and social influences on pain and pain management  - Manage/alleviate anxiety  - Utilize distraction and/or relaxation techniques  - Monitor for opioid side effects  - Notify MD/LIP if interventions unsuccessful or patient reports new pain  - Anticipate increased pain with activity and pre-medicate as appropriate  Outcome: Progressing

## (undated) DEVICE — NON-ADHERENT PAD PREPACK: Brand: TELFA

## (undated) DEVICE — NEEDLE HPO 18GA 1.5IN ECLPS

## (undated) DEVICE — ZIPWIRE GUIDEWIRE .035X150 STR

## (undated) DEVICE — Device

## (undated) DEVICE — ENDOSCOPY PACK - LOWER: Brand: MEDLINE INDUSTRIES, INC.

## (undated) DEVICE — REM POLYHESIVE ADULT PATIENT RETURN ELECTRODE: Brand: VALLEYLAB

## (undated) DEVICE — NEEDLE SPINAL 22X5 405148

## (undated) DEVICE — ENDOSCOPY PACK UPPER: Brand: MEDLINE INDUSTRIES, INC.

## (undated) DEVICE — GAMMEX® NON-LATEX SIZE 7.5, STERILE NEOPRENE POWDER-FREE SURGICAL GLOVE: Brand: GAMMEX

## (undated) DEVICE — UROLOGY DRAIN BAG

## (undated) DEVICE — Device: Brand: JELCO

## (undated) DEVICE — CATH URTH BDX IC 20FR FL 2

## (undated) DEVICE — Device: Brand: DEFENDO AIR/WATER/SUCTION AND BIOPSY VALVE

## (undated) DEVICE — STERILE LATEX POWDER-FREE SURGICAL GLOVESWITH NITRILE COATING: Brand: PROTEXIS

## (undated) DEVICE — PLASTC TOOMEY SYRNG DISP

## (undated) DEVICE — SOL H2O 1000ML BTL

## (undated) DEVICE — SUTURE ETHILON 2-0 FS

## (undated) DEVICE — BAG SRG CLR 36X30IN

## (undated) DEVICE — SUTURE SILK 3-0 SH

## (undated) DEVICE — OCCLUSIVE GAUZE STRIP,3% BISMUTH TRIBROMOPHENATE IN PETROLATUM BLEND: Brand: XEROFORM

## (undated) DEVICE — FORCEP RADIAL JAW 4

## (undated) DEVICE — ONE-STEP SUPRAPUBIC INTRODUCER: Brand: COOK

## (undated) DEVICE — 3M™ TEGADERM™ TRANSPARENT FILM DRESSING, 1626W, 4 IN X 4-3/4 IN (10 CM X 12 CM), 50 EACH/CARTON, 4 CARTON/CASE: Brand: 3M™ TEGADERM™

## (undated) DEVICE — 3M™ IOBAN™ 2 ANTIMICROBIAL INCISE DRAPE 6650EZ: Brand: IOBAN™ 2

## (undated) DEVICE — GLV RAD SURG 8.5

## (undated) DEVICE — OUTPATIENT: Brand: MEDLINE INDUSTRIES, INC.

## (undated) DEVICE — SOL  .9 1000ML BTL

## (undated) DEVICE — BAG DRAIN INFECTION CNTRL 2000

## (undated) DEVICE — COVER SGL STRL LGHT HNDL BLU

## (undated) DEVICE — DRAPE SHEET LG

## (undated) DEVICE — TOWEL OR BLU 16X26 STRL

## (undated) DEVICE — BLADE 11 SHRP BP SS SRG STRL

## (undated) DEVICE — SUTURE ETHILON 3-0 669H

## (undated) DEVICE — DRAPE SHEET LAPAROTOMY

## (undated) DEVICE — CATH SECURING DEVICE STATLOCK

## (undated) DEVICE — SOL H2O 3000ML IRRIG

## (undated) DEVICE — TRAY SKIN PREP PVP-1

## (undated) DEVICE — STERILE SURGICAL LUBRICANT, METAL TUBE: Brand: SURGILUBE

## (undated) DEVICE — CYSTO PACK: Brand: MEDLINE INDUSTRIES, INC.

## (undated) NOTE — LETTER
3/13/2019    Patient: Cathye Fabry   MR Number: [id number]   YOB: 1940   Date of Visit: 3/13/2019   Physician: Jose Baugh MD     Dear Medicare Patient:  Elaine Valdez TO BENEFICIARY:  Please know that while a refraction is imp

## (undated) NOTE — IP AVS SNAPSHOT
[facility]            (For Outpatient Use Only) Initial Admit Date: 12/4/2017   Inpt/Obs Admit Date: Inpt: 12/7/17 / Obs: 12/04/17   Discharge Date:    Jaleesa Anton:  [de-identified]   MRN: [de-identified]   CSN: [id number]        ENCOUNTER  Patient Subscriber Name:  Maksim Nina :    Subscriber ID:  Pt Rel to Subscriber:    Hospital Account Financial Class: Medicare    2017

## (undated) NOTE — IP AVS SNAPSHOT
Patient Demographics     Address  [address] [place] [address] Phone  [phone] (Home) *Preferred*      Emergency Contact(s)     Name Relation Home Work Mobile    Pocahontas Spouse [phone]    [place] Record Inject 9 mL (450 mg total) into the vein daily for 26 days. Weekly CBC/diff, CMP  Stop taking on: December 7, 2020   Deshawn Alarcon PA-C         finasteride 5 MG Tabs  Commonly known as: PROSCAR      TAKE ONE TABLET BY MOUTH DAILY   Paige Nash, Patient Weight  71.9 kg (158 lb 8 oz)      Lab Results Last 24 Hours    No matching results found     Microbiology Results (All)     Procedure Component Value Units Date/Time    Blood Culture FREQ X 2 [[id number]] Collected: 11/09/20 1151    Order Status: C Cefazolin  Resistant    Clindamycin <=0.25  Sensitive    Daptomycin <=0.25  Sensitive [1]     Erythromycin <=0.25  Sensitive    Gentamicin <=0.5  Sensitive    Levofloxacin >=8  Resistant    Oxacillin >0.5  Resistant    Trimethoprim/Sulfa 80  Resistant Urine Culture, Routine Once [[id number]]  (Abnormal) Collected: 11/03/20 1708    Order Status: Completed Lab Status: Final result Updated: 11/04/20 1415    Specimen: Urine, clean catch      Urine Culture >100,000 CFU/ML Staphylococcus species, not aureus • Back problem     lower back spinal stenosis   • Cataract    • Chronic indwelling Azul catheter    • Enlarged prostate    • Essential hypertension    • Gout    • High blood pressure    • Incontinence    • Muscle weakness    • Parkinson disease (HCC)    • •  clotrimazole-betamethasone 1-0.05 % External Cream, Apply a finger tip full of cream to the affected area twice daily as needed. (Patient taking differently: Apply a finger tip full of cream to the affected area twice daily as needed.   To bilateral groi BILT 0.5 11/03/2020    TP 7.4 11/03/2020    AST 28 11/03/2020    ALT 18 11/03/2020    TSH 0.549 05/29/2019    LIP 55 (L) 11/03/2020    PSA 0.3 04/18/2018    MG 3.3 (H) 11/03/2020    PHOS 2.8 10/09/2017    TROP 0.01 10/13/2017    B12 1,171 (H) 09/20/2017 Acute renal insufficiency[AE.1] improved renal on board continue IV fluids[AE.2]                  Ronald Espinoza MD  11/4/2020[AE.1]    Electronically signed by Annie Finnegan MD on 11/4/2020  9:15 PM   Attribution Key    AE. 1 - Annie Finnegan MD on 11/4 Patient with a history of chronic urinary retention requiring clark catheterization. Clark has been in place since 10/2017 was was changed monthly. He developed penile erosion related to clark catheter and placement of SPT tube was recommended.   He is s/ Procedure Laterality Date   • COLONOSCOPY     • COLONOSCOPY N/A 11/9/2018    Performed by Vane Yepez MD at [facility] ENDOSCOPY   • CYSTOSCOPY N/A 10/13/2020    Performed by Talia Morales MD at [facility] MAIN OR   • EGD  10/2017    normal   • ESOPHAGOGASTRODUO •  acetaminophen 325 MG Oral Tab, Take 650 mg by mouth every 6 (six) hours as needed for Pain. •  Cyanocobalamin (B-12) 500 MCG Oral Tab, Take 1 tablet by mouth After dinner.       •  Vitamin D3 1000 units Oral Tab, Take 2,000 Units by mouth TID (Nitrate CONCLUSION:   Examination limited by motion. Suprapubic catheter within the urinary bladder. Intraluminal bladder air is likely iatrogenic. Mild perivesicular fat stranding which may reflect cystitis. Advise correlation with urinalysis.   Mild right hyd Paige Nash, APRN  11/4/2020[RH.1]    ADDENDUM      History and evaluation above reviewed and I agree.   I also evaluated patient myself.[PK.1]       Review of previous records:   8/28/2017 visit with Dr. Wally Barber; gait difficulties, bradykinesia a 12/10/17 Cole KEN with infectious disease = diarrhea, loose stool but not watery associated with slight cramping; continue Zosyn.  12/10/2017 neurogenic bladder Parkinson's disease helps exacerbate this; patient is unable to feel any sensation or n Bladder soft and nondistended with suprapubic 14 Estonian catheter exiting out of the suprapubic area and urine is yellow  Testes descended bilaterally without nodules and no orchitis. Epididymitis is normal and no epididymitis.   Rectum––stool impacted  Pro Filed: 11/12/2020  3:35 PM Date of Service: 11/12/2020  3:24 PM Status: Signed    : Gabriela Esparza PTA (Physical Therapist)       PHYSICAL THERAPY TREATMENT NOTE - INPATIENT     Room Number: 468/468-A       Presenting Problem: AMS, sepsis    Prob Management Techniques:  Activity promotion    BALANCE                                                                                                                     Static Sitting: Poor  Dynamic Sitting: Poor -           Static Standing: Not tested  Dy Current Status NT   Goal #3 Patient to demonstrate independence with home activity/exercise instructions provided to patient in preparation for discharge. Goal #3   Current Status  in progress   Goal #6     Goal #6  Current Status      [DK. 1] The patient's Approx Degree of Impairment: 79.59% has been calculated based on documentation in the [facility] '6 clicks' Inpatient Daily Activity Short Form. Research supports that patients with this level of impairment may benefit from SHIRLEY.     DISCHARGE JEAN Patient will complete bed to chair transfer with max assist.   Comment:na          Goals  on: 2020  Frequency: 2-3 x/week[IGNACIO.1]       Attribution Key    IGNACIO. 1 - Leonel Mccrary OT on 2020  3:23 PM                     Video Swallow Study Pt seen for swallowing therapy to monitor swallowing tolerance and train swallowing precautions per BSE. Collaborated with RN, whom reports pt is tolerating diet well. RN reports plans to transfer to [place] today.   Pt seen for swallowing therapy w PLAN: SLP to sign off case. Continuation of speech/swallowing therapy services to continue at next facility of care. Collaborated with RN. MOST RECENT CXR 11/4/20:  =====  CONCLUSION: No acute disease.       Dictated by (CST): Celia Allen, Goal #3 The patient will tolerate trial upgrade of REGULAR consistency and THIN liquids without overt signs or symptoms of aspiration with 85 % accuracy over 2-3 session(s). Discontinued. Not appropriate for upgraded solids at this time.      DISCONTINUED Description: Patient's Short Term Goal: Absence of urine retention    Interventions:   - Care and maintenance of suprapubic catheter  - Vital signs monitoring  - Medications as ordered    - See additional Care Plan goals for specific interventions

## (undated) NOTE — IP AVS SNAPSHOT
Patient Demographics     Address  [address] [place] [address] Phone  [phone] [facility])      Emergency Contact(s)     Name Relation Home Work Mobile    Rona Meehan Spouse [phone]        Allergies as of 10/18/2017  Reviewed on: valsartan 160 MG Tabs  Commonly known as:  DIOVAN  Next dose due: Tonight. Take 160 mg by mouth 2 (two) times daily. Vitamin D3 1000 units Tabs  Commonly known as:  VITAMIN D3  Next dose due: Tomorrow morning.        Take 1,000 Units Vitals  135/64 Filed at 10/18/2017 0900   Pulse  69 Filed at 10/18/2017 0900   Resp  18 Filed at 10/18/2017 0900   Temp  97.7 °F (36.5 °C) Filed at 10/18/2017 0900   SpO2  96 % Filed at 10/18/2017 0900      Patient's Most Recent Weight    Flowsheet Row Mos H&P - H&P Note      H&P signed by Marta Dykes MD at 10/16/2017 12:35 PM  Version 1 of 1    Author:  Marta Dykes MD Service:  Gastroenterology Author Type:  Physician    Filed:  10/16/2017 12:35 PM Date of Service:  10/16/2017 12:34 PM Status:  Sign Author:  Petr Sequeira MD Service:  Urology Author Type:  Physician    Filed:  10/17/2017 12:15 PM Date of Service:  10/17/2017  6:52 AM Status:  Signed    :  Petr Sequeira MD (Physician)       [facility]    Report of Consu 9/27/17; Azul catheter inserted [facility] ER 10/3/17. Imaging studies since then have shown that the right hydronephrosis has resolved.     Parkinson's disease  Diagnosed by neurologist Dr. Edwina Clarke office consult 8/20/17    Smoking history--smoke previously b Smokeless tobacco: Never Used                      Alcohol use:  No                 Medications (In-patient):     Current Facility-Administered Medications:  PEG 3350 (MIRALAX) powder packet 17 g 17 g Oral Daily   AmLODIPine Besylate (NORVASC) tab 5 mg 5 mg Psychiatric:  Negative for inappropriate interaction and psychiatric symptoms  Respiratory:  Negative for cough, dyspnea and wheezing    PHYSICAL EXAM:   Constitutional: appears well hydrated alert and responsive no acute distress noted  Neurological: Josh Horn 10/7/17 urine culture greater than 100,000 Acinetobacter baumannii--resistant to nitrofurantoin, Zosyn  10/07/2017 Urine culture = >100,000 CFU/ML Acinetobacter baumannii complex;  Nitrofurantoin >=512 Resistant; Piperacillin + Tazobactam >=128 Resistant; U echogenicity. GALLBLADDER:   The gallbladder is contracted which accounts for the mild appearance of wall thickening measuring up to 4 mm. No pericholecystic fluid, cholelithiasis, or sonographic Cox sign.  BILE DUCTS:   Normal.  Common bile duct measure hydroureteronephrosis. The distal right ureter appears to be dilated. No suspicious mass, calculus or perirenal fluid. LEFT KIDNEY: Normal.  Left kidney length is 10.5 cm. Normal echotexture.   There is a simple-appearing 1.7 x 1.1 x 1.5 cm left interpola PROCEDURE: CT ABDOMEN PELVIS IV CONTRAST NO ORAL (ER)  COMPARISON: None. INDICATIONS: Patient is having nausea and vomiting since this morning.   TECHNIQUE: CT images of the abdomen and pelvis were obtained with non-ionic intravenous contrast material.  Au Bladder wall induration. PELVIC NODES: No enlarged mass or adenopathy. PELVIC ORGANS: Prostatomegaly measures 4.8 cm. Correlate with PSA levels. BONES:   Shaped scoliosis and advanced multilevel thoracolumbar spondylosis.  Significant compression fracture Review of previous records:[PK.1] Please see above[PK. 4]    ASSESSMENT/PLAN:      Acute cystitis without hematuria[PK.1]    1. Recent urinary retention 1 L 10/2/17 with Azul catheter in place since then    2.   Parkinson's disease--suspected neurogenic bl attending Dr. Princess Almeida. 4]   is a very pleasant patient and I thoroughly enjoyed evaluating him  in consultation. I thank you for sending the patient to see me.   I will do my best to keep you informed of  all significant urological find PT Discharge Recommendations: Cont skilled therapy in a supervised setting     PLAN  PT Treatment Plan: Body mechanics; Endurance;Gait training;Stair training    SUBJECTIVE  Pt reports being ready for PT RX    OBJECTIVE  Precautions: Bed/chair alarm    [facility] Goal #1 Patient is able to demonstrate supine - sit EOB @ level: independent     Goal #1   Current Status Mod a   Goal #2 Patient is able to demonstrate transfers Sit to/from Stand at assistance level: independent with walker - rolling     Goal #2  Current follow. Pt displays narrow VICKI, short strides, shuffling gait. Pt able to increase stride and improve foot clearance with verbal and visual cues.  Pt manages UE self care from supported sitting with set up, min A for LE dressing - able to reach WILLIAM feet usi Upper Extremity Dressing: set up  Lower Extremity Dressing: min A for standing portions    Education Provided: discharge recommendations, role of OT  Patient End of Session: Up in chair;Call light within reach; Needs met;RN aware of session/findings    ANAI CHILD x1-2 person assist with RW. Pt ambulates with a shuffling gait. He requires max A for lower body dressing tasks. Pt completed the Mini-Cog (cognitive screener tool) and scored 1/5. Wife reports pt has a h/o Parkinson's disease and takes medication.  He has Prior Level of Reynolds:[EA.1] Pt lives with his wife in a condo. He is independent with ADLs until recently. He uses a RW at all times.  He has a walk in shower with a grab bar.[EA.2]    SUBJECTIVE[EA.1]  Pt agreeable to tx[EA.2]      OCCUPATIONAL THER Sit to Stand:  Moderate assistance (Mod A x2)  Toilet Transfer:[EA.1] NT- recommend use of rolling comode chair[EA.2]  Shower Transfer:[EA.1] NT[EA.2]  Chair Transfer:[EA.1] Mod A x2 with RW[EA.2]    Bedroom Mobility:[EA.1] Mod A with RW x3 ft with short sh 11/7/2017 9:00 AM [facility] XR [id number] [address] Xray [facility]    11/8/2017 1:00 PM Lidia Hernandez MD [facility], [address] [place], [place] [address]nettie [place]    11/10/2017 11:00 AM Glenn Guerrero MD [facility] for [place], [address] [facility]

## (undated) NOTE — ED AVS SNAPSHOT
Elba Krause. MRN: [id number]    Department:  [facility] Emergency Department   Date of Visit:  10/10/2017           Disclosure     Insurance plans vary and the physician(s) referred by the ER may not be covered by your plan.  Please conta CARE PHYSICIAN AT ONCE OR RETURN IMMEDIATELY TO THE EMERGENCY DEPARTMENT. If you have been prescribed any medication(s), please fill your prescription right away and begin taking the medication(s) as directed.   If you believe that any of the medications

## (undated) NOTE — LETTER
[address] [facility], [address] [place] [address]  [address] [facility] [phone]        Dear Bryant Bassett MD,      I had the pleasure of seeing your patient, Cece Courser. on 8/28/2017.      Below please find a summ He has had urinary incontinence. He has experienced some cognitive problems recently. He tried physical therapy treatments for his gait, and does not feel he made any improvement. Currently is having more difficulty getting in and out of the car.   He al SKIN: denies any unusual skin lesions or rashes  EYES: no visual complaints or deficits  HEENT: denies nasal congestion, sinus pain or sore throat; hearing loss negative  RESPIRATORY: denies shortness of breath, wheezing or cough   CARDIOVASCULAR: denies c DTR: 2+ in the upper and lower extremities, with absent ankles. No Babinski, no hoffmans, no clonus  Coordination: Finger to nose, heel to shin intact bilaterally. Gait: He can stand only with assistance.   He has a stooped posture with increased axial

## (undated) NOTE — LETTER
March 13, 2019    Parth Galvan MD  [address] [address]     Patient: Bradley Gómez   YOB: 1940   Date of Visit: 3/13/2019       Dear Dr. Javan Marinelli MD:    Thank you for referring Bradley Gómez to me for evaluation.  Her lisinopril 40 MG Oral Tab Take 1 tablet (40 mg total) by mouth daily. Disp: 40 tablet Rfl: 2   finasteride 5 MG Oral Tab Take 1 tablet (5 mg total) by mouth once daily.  Disp: 30 tablet Rfl: 11   hydrochlorothiazide 12.5 MG Oral Tab Take 1 tablet (12.5 mg t Cornea Clear, oily tear film, no K spindles Clear, oily tear film, no K spindles    Anterior Chamber Deep and quiet Deep and quiet    Iris No transillumination defects No transillumination defects    Lens 2+ Nuclear sclerosis 2+ Nuclear sclerosis    Vitre

## (undated) NOTE — IP AVS SNAPSHOT
[facility]            (For Outpatient Use Only) Initial Admit Date: 11/3/2020   Inpt/Obs Admit Date: Inpt: 11/3/20 / Obs: N/A   Discharge Date:    Jaleesa Larke:  [de-identified]   MRN: [de-identified]   CSN: [id number]   CEID: [id number]        QJO Subscriber ID: [id number] Pt Rel to Subscriber: SELF   TERTIARY INSURANCE   Payor:  Plan:    Group Number:  Insurance Type:    Subscriber Name:  Subscriber :    Subscriber ID:  Pt Rel to Subscriber:    Hospital Account Financial Class: Medicare    Nov

## (undated) NOTE — LETTER
[address] [facility], [address] [place] [address]  [address] [facility] [phone]        Dear Balta Rothman MD,      I had the pleasure of seeing your patient, Prabhakar Mercedes. on 11/27/2017.      Below please find a sum Pantoprazole Sodium 40 MG Oral Tab EC Take 1 tablet (40 mg total) by mouth every morning before breakfast. Disp: 14 tablet Rfl: 0   tamsulosin HCl 0.4 MG Oral Cap Take 1/2 hour following the same meal each day (Patient taking differently: 0.4 mg daily.  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 Previous CT and MRI scans did reveal confluent white matter changes.   He has been having some dysarthria and dysphasia, which also may be related to his Parkinson's dis

## (undated) NOTE — IP AVS SNAPSHOT
[facility]            (For Outpatient Use Only) Initial Admit Date: 10/13/2017   Inpt/Obs Admit Date: Inpt: 10/14/17 / Obs: N/A   Discharge Date:    Carylon Kawasaki:  [de-identified]   MRN: [de-identified]   CSN: [id number]        ENCOUNTER  Patient Cla Subscriber Name:  Brittnee Bunailyn :    Subscriber ID:  Pt Rel to Subscriber:    Hospital Account Financial Class: Medicare    2017

## (undated) NOTE — ED AVS SNAPSHOT
Elva Salinas. MRN: [id number]    Department:  [facility] Emergency Department   Date of Visit:  10/7/2017           Disclosure     Insurance plans vary and the physician(s) referred by the ER may not be covered by your plan.  Please contac CARE PHYSICIAN AT ONCE OR RETURN IMMEDIATELY TO THE EMERGENCY DEPARTMENT. If you have been prescribed any medication(s), please fill your prescription right away and begin taking the medication(s) as directed.   If you believe that any of the medications

## (undated) NOTE — LETTER
No referring provider defined for this encounter. 11/28/17        Patient: Emanuel Osuna. YOB: 1940   Date of Visit: 11/28/2017       Dear  Dr. Salvador Carpio MD,      Thank you for referring Emanuel Osuna. to my practice.   Jose Guadalupe

## (undated) NOTE — LETTER
[address], [address], [address] [facility]  [address] [address], [address]  [place]   [phone]        Dear Author MD Rodrick,      I had the pleasure of seeing your patient, Nacho Shaikh on 2/27/2018.      Below ple carbidopa-levodopa  MG Oral Tab Take 2 tablets by mouth 2 (two) times daily. Disp: 360 tablet Rfl: 3   rOPINIRole HCl 0.5 MG Oral Tab Take 1 tablet (0.5 mg total) by mouth 2 (two) times daily.  Disp: 60 tablet Rfl: 5   clotrimazole-betamethasone 1-0.0 • Enlarged prostate    • Essential hypertension    • Gout    • High blood pressure       History reviewed. No pertinent surgical history.    Family History   Problem Relation Age of Onset   • Cancer Father    • Cancer Mother    • Cancer Sister       Social Pupil react to light. Fundoscopic exam normal.  III,IV,VI- EOM full, with normal pursuit. V-Facial sensation intact, with symmetric corneal reflex. VII-mild right facial droop. Leta Bucker VIII- Auditory acuity symmetric.  IX,X- Palate elevates in midline, with good

## (undated) NOTE — ED AVS SNAPSHOT
Marta Edmond. MRN: [id number]    Department:  [facility] Emergency Department   Date of Visit:  10/3/2017           Disclosure     Insurance plans vary and the physician(s) referred by the ER may not be covered by your plan.  Please contac CARE PHYSICIAN AT ONCE OR RETURN IMMEDIATELY TO THE EMERGENCY DEPARTMENT. If you have been prescribed any medication(s), please fill your prescription right away and begin taking the medication(s) as directed.   If you believe that any of the medications

## (undated) NOTE — LETTER
No referring provider defined for this encounter. 10/11/17        Patient: Sharron Myers. YOB: 1940   Date of Visit: 10/9/2017       Dear  Dr. Coty Cooper MD,      Thank you for referring Sharron Myers. to my practice. receiving CBC today to make sure no dressing urinary tract infection in light of worsening fatigue and being more tired today. I also asked the nures to clean the outside of the catheter from secretions.     (Anamika Almaguer) Parkinson's disease [facility])  Patient has Anel Lazar 10.  For secretions around the Azul catheter, please clean them off catheter with hydrogen peroxide daily as needed.   Do NOT used rubbing alcohol        August Bourgeois.  is a very pleasant patient and I thoroughly enjoyed evaluating him  in consultation

## (undated) NOTE — LETTER
Dear Dr. Miramontes Erm    This letter is to inform you that Cindycarlos Gonzales has been attending Physical Therapy with me. See below for my most recent plan of care.        Patient Name: Cindy Gonzales, : 1940, MRN: [id number]   Date: 2018 ___4___2. Standing unsupported   ___4___3. Sitting unsupported   ___3___4. Standing to sitting   ___2___5. Transfers   ___3___6. Standing with eyes closed   ___3___7. Standing with feet together   ___2___8.  Reaching forward with outstretched arm   ___3___9

## (undated) NOTE — LETTER
ADULT VIDEOFLUOROSCOPIC SWALLOWING STUDY    Referring Physician: Dr. Анна Jeffries  Diagnosis: dysphagia   Date of Service: 7/30/2019   Radiologist: Dr. Daryle Roller    Dear Dr. Анна Jeffries,    This letter is to inform you of Bettye Guzman Videofluoroscopic Swallowing remained which was propelled into the pharynx, but not always swallowed.     Pharyngeal phase:  Swallows triggered at the tongue base for liquids, at the valleculae for puree and gravitated all the way to the pyriform sinuses with solid consistencies due to liquids without overt signs or symptoms of aspiration with 100 % accuracy over 3 session(s).    Goal #2 The patient/family/caregiver will demonstrate understanding and implementation of aspiration precautions and swallow strategies independently over 3 sess

## (undated) NOTE — IP AVS SNAPSHOT
Patient Demographics     Address  [address] [place] [address] Phone  [phone] [facility])      Emergency Contact(s)     Name Relation Home Work Mobile    [place] Spouse [phone]      Allergies as of 12/11/2017  R Inject 3.375 g into the vein every 8 (eight) hours. ICD10: N50  Stop taking on:  12/14/2017   SUELELN [place]         finasteride 5 MG Tabs  Commonly known as:  PROSCAR  Next dose due:  12/12/17      Take 1 tablet (5 mg total) by mouth daily.    Mike (3-0.375) g SOLR 3.375 g  HYDROcodone-acetaminophen 5-325 MG Tabs           [id number] - MAR ACTION REPORT  (last 24 hrs)    ** SITE UNKNOWN **     Order ID Medication Name Action Time Action Reason Comments    [id number] AmLODIPine Besylate (NORVASC) tab 5 12/11/17 0838 Given      [id number] carbidopa-levodopa (SINEMET)  MG per tab 1 tablet 12/11/17 1307 Given      [id number] clotrimazole-betamethasone (LOTRISONE) cream 12/10/17 2154 Given      [id number] clotrimazole-betamethasone (LOTRISONE) cream 12/11 Chronic Kidney Disease: GFR <60 ml/min/1.73 m2  Kidney failure: GFR <15 ml/min/1.73 m2    The accuracy of the MDRD equation is not suitable for acute renal failure patients and it is not recommended for use with pregnant women.             CBC WITH STEPHANIA Urine Culture 10-50,000 cfu/ml Multiple species present-probable contamination.       Enterococcus species (A)    Urine Culture, Routine Once [[id number]]  (Abnormal)  (Susceptibility) Collected:  12/04/17 4683    Order Status:  Completed Lab Status:  Edit Back Pain (musculoskeletal)[AE.1]    The patient is a 13-year-old male with a history of Parkinson's who presents with mid to low back pain after falling at home 2 days ago.   He has a history of frequent urinary tract infections and was recently discharged tamsulosin HCl 0.4 MG Oral Cap Take 1/2 hour following the same meal each day (Patient taking differently: 0.4 mg daily. Take 1/2 hour following the same meal each day )   finasteride 5 MG Oral Tab Take 1 tablet (5 mg total) by mouth daily.    clotrimazole- Pulmonary/Chest:[AE.1] Effort normal[AE.2] and[AE.1] breath sounds normal[AE.2]. Abdominal:[AE.1] Soft[AE.2]. [AE.1] Bowel sounds are normal[AE.2]. Musculoskeletal:[AE.1] Normal range of motion[AE.2]. [AE.1]   Low back pain,will difficulty moving[AE.2] anterior fascicular block.  ABNORMAL When compared with ECG of 10/13/2017 20:12:33 inferior infarct pattern new frequent atrial and ventricular ectopic beats new Electronically signed on 12/04/2017 at 16:23 by Shantel Clark MD      Assessment/Plan:     Anjana Garcia [facility] - [facility]    Report of Consultation    Montrell Sherman.  Patient Status:  Observation    1940 MRN [id number]   Location [facility] 5SW/SE Attending Teagan Bray MD   Hosp Day # 0 PCP Zoey Beard MD     Date of Admis Smokeless tobacco: Never Used                      Alcohol use:  No                 Medications (In-patient):     Current Facility-Administered Medications:  Miconazole Nitrate 2 % powder  Topical [email or web address]   aspirin EC tab 81 mg 81 mg Oral Daily   CefT Allergic/Immuno:  Negative for environmental allergies and food allergies  Cardiovascular:  Negative for cool extremity and irregular heartbeat/palpitations  Constitutional:  Negative for decreased activity, fever, irritability and lethargy  ENMT:  Negativ slight 1 mm erosion 6 o'clock position a meatus of urethra from Azul catheter[PK. 4]  PENIS--non[PK.3]-[PK.4]circumcised. [PK.3]  Balanitis but no posthitis; slight erosion 6 o'clock position a meatus of urethra from Azul catheter[PK.4]  Perineum [facility] BLOODURINE Negative Moderate* Trace* Small* Negative Small*   NITRITE Negative Negative Negative Positive* Positive* Positive*   UROBILINOGEN <2.0 <2.0 <2.0 <2.0 <2.0 <2.0   LEUUR Negative Trace* Moderate* Large* Large* Moderate*   UASA Negative Negative N discomfort even though 1 L drained off bladder and patient came with Azul catheter in place; has been started on tamsulosin in the emergency room; during office consult, prostate only 1-2+ enlarged; the abnormal renal ultrasound 9/27/17 noting moderate ri 3) Citrobacter[PK.2] freundii[PK. 3] positive urine culture 12/4/17 sensitive to IV ceftriaxone which was started[PK.2]. I will order kidney ultrasound to make sure no new hydronephrosis. Consider infectious disease consult. 4).   History of right hydr urological findings and developments. 12/7/2017  Kelley Kwan MD[PK.1]    Electronically signed by Emmy Oglesby MD on 12/7/2017  8:15 PM   Attribution Key    PK. 1 - Emmy Oglesby MD on 12/7/2017  7:19 PM  PK. 2 - Emmy Oglesby and mod assist necessary to transition to sitting EOB; mod assist to facilitate weight shift to scoot EOB; Min assist for sit to stand from elevated EOB with tactile cues to attain COM over VICKI, due to backward lean;  Increased time to perform 3' ambulation BALANCE                                                                                                                     Static Sitting: Fair  Dynamic Sitting: Fair           Static Standing: Poor +  Dynamic Standing: Poor +    ACTIVITY TOLERANCE  Room assistance level: minimum assistance with walker - rolling   Goal #2  Current Status  Pt demo with min assist x 1 with rw, cues   Goal #3 Patient is able to ambulate 48   feet with assist device: walker - rolling at assistance level: minimum assistance   G the swallow appeared timely across all trials. Hyolaryngeal elevation to palpation appeared reduced in 2-3 second completion. No reflexive cough, no throat clear, clear vocal quality across all trials. Pt with spontaneous multiple swallows per bite/sip.  Un initial encounter [facility])    Fall as cause of accidental injury in home as place of occurrence      Past Medical History  Past Medical History:   Diagnosis Date   • Back problem     lower back spinal stenosis   • Enlarged prostate    • Essential hypertension independently over 3 session(s).     In Progress   Goal #3 The patient will utilize compensatory strategies as outlined by  BSSE (clinical evaluation) including Slow rate, Small bites, Small sips, Multiple swallows, Alternate liquids/solids, Upright 90 degr